# Patient Record
Sex: MALE | Race: WHITE | NOT HISPANIC OR LATINO | Employment: FULL TIME | URBAN - METROPOLITAN AREA
[De-identification: names, ages, dates, MRNs, and addresses within clinical notes are randomized per-mention and may not be internally consistent; named-entity substitution may affect disease eponyms.]

---

## 2017-03-17 ENCOUNTER — TRANSCRIBE ORDERS (OUTPATIENT)
Dept: ADMINISTRATIVE | Facility: HOSPITAL | Age: 27
End: 2017-03-17

## 2017-03-17 ENCOUNTER — HOSPITAL ENCOUNTER (OUTPATIENT)
Dept: RADIOLOGY | Facility: MEDICAL CENTER | Age: 27
Discharge: HOME | End: 2017-03-17
Payer: COMMERCIAL

## 2017-03-17 DIAGNOSIS — M99.05 SOMATIC DYSFUNCTION OF PELVIS REGION: ICD-10-CM

## 2017-03-17 DIAGNOSIS — M99.03 SOMATIC DYSFUNCTION OF LUMBAR REGION: ICD-10-CM

## 2017-03-17 DIAGNOSIS — M79.10 MYALGIA: ICD-10-CM

## 2017-03-17 DIAGNOSIS — M99.04 SOMATIC DYSFUNCTION OF SACRAL REGION: ICD-10-CM

## 2017-03-17 DIAGNOSIS — M79.10 MYALGIA: Primary | ICD-10-CM

## 2017-03-17 PROCEDURE — 72110 X-RAY EXAM L-2 SPINE 4/>VWS: CPT

## 2017-03-17 PROCEDURE — 72170 X-RAY EXAM OF PELVIS: CPT

## 2017-04-12 ENCOUNTER — OFFICE VISIT (OUTPATIENT)
Dept: URGENT CARE | Age: 27
End: 2017-04-12
Payer: COMMERCIAL

## 2017-04-12 PROCEDURE — S9088 SERVICES PROVIDED IN URGENT: HCPCS | Performed by: FAMILY MEDICINE

## 2017-04-12 PROCEDURE — 99203 OFFICE O/P NEW LOW 30 MIN: CPT | Performed by: FAMILY MEDICINE

## 2017-06-27 ENCOUNTER — ALLSCRIPTS OFFICE VISIT (OUTPATIENT)
Dept: OTHER | Facility: OTHER | Age: 27
End: 2017-06-27

## 2017-09-13 ENCOUNTER — TRANSCRIBE ORDERS (OUTPATIENT)
Dept: ADMINISTRATIVE | Facility: HOSPITAL | Age: 27
End: 2017-09-13

## 2017-09-13 DIAGNOSIS — J32.9 CHRONIC SINUSITIS, UNSPECIFIED LOCATION: Primary | ICD-10-CM

## 2017-10-20 ENCOUNTER — HOSPITAL ENCOUNTER (EMERGENCY)
Facility: HOSPITAL | Age: 27
Discharge: HOME | End: 2017-10-21
Attending: EMERGENCY MEDICINE
Payer: COMMERCIAL

## 2017-10-20 DIAGNOSIS — J45.901 ALLERGIC ASTHMA WITH SEVERE ASTHMA ATTACK: Primary | ICD-10-CM

## 2017-10-20 DIAGNOSIS — R09.02 HYPOXIA: ICD-10-CM

## 2017-10-20 DIAGNOSIS — R10.9 ABDOMINAL PAIN: ICD-10-CM

## 2017-10-20 PROCEDURE — 96375 TX/PRO/DX INJ NEW DRUG ADDON: CPT

## 2017-10-20 PROCEDURE — 96372 THER/PROPH/DIAG INJ SC/IM: CPT

## 2017-10-20 PROCEDURE — 93005 ELECTROCARDIOGRAM TRACING: CPT

## 2017-10-20 PROCEDURE — 94640 AIRWAY INHALATION TREATMENT: CPT

## 2017-10-20 RX ORDER — METHYLPREDNISOLONE SODIUM SUCCINATE 125 MG/2ML
INJECTION, POWDER, LYOPHILIZED, FOR SOLUTION INTRAMUSCULAR; INTRAVENOUS
Status: COMPLETED
Start: 2017-10-20 | End: 2017-10-21

## 2017-10-20 RX ADMIN — METHYLPREDNISOLONE SODIUM SUCCINATE 125 MG: 125 INJECTION, POWDER, LYOPHILIZED, FOR SOLUTION INTRAMUSCULAR; INTRAVENOUS at 23:58

## 2017-10-20 RX ADMIN — IPRATROPIUM BROMIDE AND ALBUTEROL SULFATE 3 ML: .5; 3 SOLUTION RESPIRATORY (INHALATION) at 23:56

## 2017-10-20 RX ADMIN — EPINEPHRINE 0.3 MG: 1 INJECTION, SOLUTION, CONCENTRATE INTRAVENOUS at 23:58

## 2017-10-20 RX ADMIN — METHYLPREDNISOLONE SODIUM SUCCINATE 125 MG: 125 INJECTION, POWDER, FOR SOLUTION INTRAMUSCULAR; INTRAVENOUS at 23:58

## 2017-10-21 ENCOUNTER — HOSPITAL ENCOUNTER (OUTPATIENT)
Facility: HOSPITAL | Age: 27
Setting detail: OBSERVATION
Discharge: HOME | End: 2017-10-22
Attending: EMERGENCY MEDICINE | Admitting: INTERNAL MEDICINE
Payer: COMMERCIAL

## 2017-10-21 ENCOUNTER — APPOINTMENT (EMERGENCY)
Dept: RADIOLOGY | Facility: HOSPITAL | Age: 27
End: 2017-10-21
Payer: COMMERCIAL

## 2017-10-21 VITALS
DIASTOLIC BLOOD PRESSURE: 75 MMHG | OXYGEN SATURATION: 94 % | RESPIRATION RATE: 18 BRPM | WEIGHT: 210 LBS | TEMPERATURE: 97 F | SYSTOLIC BLOOD PRESSURE: 140 MMHG | HEART RATE: 98 BPM

## 2017-10-21 DIAGNOSIS — J45.901 ASTHMA EXACERBATION: ICD-10-CM

## 2017-10-21 DIAGNOSIS — J45.51: ICD-10-CM

## 2017-10-21 DIAGNOSIS — Z78.9 FAILURE OF OUTPATIENT TREATMENT: ICD-10-CM

## 2017-10-21 DIAGNOSIS — T78.2XXA ANAPHYLAXIS: Primary | ICD-10-CM

## 2017-10-21 PROBLEM — J33.9 ASPIRIN-SENSITIVE ASTHMA WITH NASAL POLYPS: Status: ACTIVE | Noted: 2017-10-21

## 2017-10-21 PROBLEM — J45.909 ASPIRIN-SENSITIVE ASTHMA WITH NASAL POLYPS: Status: ACTIVE | Noted: 2017-10-21

## 2017-10-21 PROBLEM — Z88.6 ASPIRIN-SENSITIVE ASTHMA WITH NASAL POLYPS: Status: ACTIVE | Noted: 2017-10-21

## 2017-10-21 PROBLEM — Z88.6 ASPIRIN-SENSITIVE ASTHMA WITH NASAL POLYPS: Chronic | Status: ACTIVE | Noted: 2017-10-21

## 2017-10-21 PROBLEM — J45.909 ASPIRIN-SENSITIVE ASTHMA WITH NASAL POLYPS: Chronic | Status: ACTIVE | Noted: 2017-10-21

## 2017-10-21 PROBLEM — J33.9 ASPIRIN-SENSITIVE ASTHMA WITH NASAL POLYPS: Chronic | Status: ACTIVE | Noted: 2017-10-21

## 2017-10-21 PROBLEM — R10.33 PERIUMBILICAL ABDOMINAL PAIN: Status: ACTIVE | Noted: 2017-10-21

## 2017-10-21 LAB
ATRIAL RATE: 100 BPM
ATRIAL RATE: 72 BPM
BASE EXCESS BLDA CALC-SCNC: 3 MMOL/L (ref -2–3)
CA-I BLD-SCNC: 1.21 MMOL/L (ref 1.12–1.32)
GLUCOSE SERPL-MCNC: 131 MG/DL (ref 65–140)
HCO3 BLDA-SCNC: 30.2 MMOL/L (ref 24–30)
HCT VFR BLD CALC: 47 % (ref 36.5–49.3)
HGB BLDA-MCNC: 16 G/DL (ref 12–17)
P AXIS: 68 DEGREES
P AXIS: 71 DEGREES
PCO2 BLD: 32 MMOL/L (ref 21–32)
PCO2 BLD: 54.3 MM HG (ref 42–50)
PH BLD: 7.35 [PH] (ref 7.3–7.4)
PO2 BLD: 39 MM HG (ref 35–45)
POTASSIUM BLD-SCNC: 3.3 MMOL/L (ref 3.5–5.3)
PR INTERVAL: 142 MS
PR INTERVAL: 144 MS
QRS AXIS: 74 DEGREES
QRS AXIS: 75 DEGREES
QRSD INTERVAL: 102 MS
QRSD INTERVAL: 96 MS
QT INTERVAL: 340 MS
QT INTERVAL: 426 MS
QTC INTERVAL: 426 MS
QTC INTERVAL: 438 MS
SAO2 % BLD FROM PO2: 69 % (ref 95–98)
SODIUM BLD-SCNC: 142 MMOL/L (ref 136–145)
SPECIMEN SOURCE: ABNORMAL
T WAVE AXIS: 59 DEGREES
T WAVE AXIS: 68 DEGREES
VENTRICULAR RATE: 100 BPM
VENTRICULAR RATE: 60 BPM

## 2017-10-21 PROCEDURE — 93005 ELECTROCARDIOGRAM TRACING: CPT

## 2017-10-21 PROCEDURE — 96361 HYDRATE IV INFUSION ADD-ON: CPT

## 2017-10-21 PROCEDURE — 85014 HEMATOCRIT: CPT

## 2017-10-21 PROCEDURE — 99285 EMERGENCY DEPT VISIT HI MDM: CPT

## 2017-10-21 PROCEDURE — 96374 THER/PROPH/DIAG INJ IV PUSH: CPT

## 2017-10-21 PROCEDURE — 94644 CONT INHLJ TX 1ST HOUR: CPT

## 2017-10-21 PROCEDURE — 93005 ELECTROCARDIOGRAM TRACING: CPT | Performed by: EMERGENCY MEDICINE

## 2017-10-21 PROCEDURE — 84132 ASSAY OF SERUM POTASSIUM: CPT

## 2017-10-21 PROCEDURE — 96375 TX/PRO/DX INJ NEW DRUG ADDON: CPT

## 2017-10-21 PROCEDURE — 94640 AIRWAY INHALATION TREATMENT: CPT

## 2017-10-21 PROCEDURE — 96365 THER/PROPH/DIAG IV INF INIT: CPT

## 2017-10-21 PROCEDURE — 84295 ASSAY OF SERUM SODIUM: CPT

## 2017-10-21 PROCEDURE — 82947 ASSAY GLUCOSE BLOOD QUANT: CPT

## 2017-10-21 PROCEDURE — 71020 HB CHEST X-RAY 2VW FRONTAL&LATL: CPT

## 2017-10-21 PROCEDURE — 96372 THER/PROPH/DIAG INJ SC/IM: CPT

## 2017-10-21 PROCEDURE — 82330 ASSAY OF CALCIUM: CPT

## 2017-10-21 PROCEDURE — 99283 EMERGENCY DEPT VISIT LOW MDM: CPT

## 2017-10-21 PROCEDURE — 94760 N-INVAS EAR/PLS OXIMETRY 1: CPT

## 2017-10-21 PROCEDURE — 82803 BLOOD GASES ANY COMBINATION: CPT

## 2017-10-21 RX ORDER — CETIRIZINE HYDROCHLORIDE 10 MG/1
10 TABLET ORAL DAILY
COMMUNITY
End: 2017-12-21

## 2017-10-21 RX ORDER — EPINEPHRINE 1 MG/ML(1)
0.3 AMPUL (ML) INJECTION AS NEEDED
Status: DISCONTINUED | OUTPATIENT
Start: 2017-10-21 | End: 2017-10-22 | Stop reason: HOSPADM

## 2017-10-21 RX ORDER — LEVALBUTEROL 1.25 MG/.5ML
1.25 SOLUTION, CONCENTRATE RESPIRATORY (INHALATION)
Status: DISCONTINUED | OUTPATIENT
Start: 2017-10-22 | End: 2017-10-22 | Stop reason: HOSPADM

## 2017-10-21 RX ORDER — ONDANSETRON 2 MG/ML
4 INJECTION INTRAMUSCULAR; INTRAVENOUS ONCE
Status: COMPLETED | OUTPATIENT
Start: 2017-10-21 | End: 2017-10-21

## 2017-10-21 RX ORDER — MAGNESIUM SULFATE HEPTAHYDRATE 40 MG/ML
2 INJECTION, SOLUTION INTRAVENOUS ONCE
Status: COMPLETED | OUTPATIENT
Start: 2017-10-21 | End: 2017-10-21

## 2017-10-21 RX ORDER — PAROXETINE 10 MG/1
10 TABLET, FILM COATED ORAL DAILY
Status: DISCONTINUED | OUTPATIENT
Start: 2017-10-22 | End: 2017-10-22

## 2017-10-21 RX ORDER — ACETAMINOPHEN 325 MG/1
650 TABLET ORAL EVERY 6 HOURS PRN
Status: DISCONTINUED | OUTPATIENT
Start: 2017-10-21 | End: 2017-10-22 | Stop reason: HOSPADM

## 2017-10-21 RX ORDER — MONTELUKAST SODIUM 10 MG/1
10 TABLET ORAL
Status: DISCONTINUED | OUTPATIENT
Start: 2017-10-21 | End: 2017-10-22 | Stop reason: HOSPADM

## 2017-10-21 RX ORDER — SODIUM CHLORIDE FOR INHALATION 0.9 %
3 VIAL, NEBULIZER (ML) INHALATION EVERY 6 HOURS PRN
Status: DISCONTINUED | OUTPATIENT
Start: 2017-10-21 | End: 2017-10-22 | Stop reason: HOSPADM

## 2017-10-21 RX ORDER — LEVALBUTEROL 1.25 MG/.5ML
1.25 SOLUTION, CONCENTRATE RESPIRATORY (INHALATION)
Status: DISCONTINUED | OUTPATIENT
Start: 2017-10-22 | End: 2017-10-21

## 2017-10-21 RX ORDER — EPINEPHRINE 0.3 MG/.3ML
0.3 INJECTION SUBCUTANEOUS ONCE
Qty: 0.3 ML | Refills: 0 | Status: SHIPPED | OUTPATIENT
Start: 2017-10-21 | End: 2017-12-21

## 2017-10-21 RX ORDER — METHYLPREDNISOLONE SODIUM SUCCINATE 125 MG/2ML
125 INJECTION, POWDER, LYOPHILIZED, FOR SOLUTION INTRAMUSCULAR; INTRAVENOUS ONCE
Status: COMPLETED | OUTPATIENT
Start: 2017-10-21 | End: 2017-10-21

## 2017-10-21 RX ORDER — ALBUTEROL SULFATE 90 UG/1
2 AEROSOL, METERED RESPIRATORY (INHALATION) EVERY 6 HOURS PRN
COMMUNITY
End: 2017-12-11

## 2017-10-21 RX ORDER — DIPHENHYDRAMINE HYDROCHLORIDE 50 MG/ML
25 INJECTION INTRAMUSCULAR; INTRAVENOUS ONCE
Status: COMPLETED | OUTPATIENT
Start: 2017-10-21 | End: 2017-10-21

## 2017-10-21 RX ORDER — SODIUM CHLORIDE FOR INHALATION 0.9 %
3 VIAL, NEBULIZER (ML) INHALATION
Status: DISCONTINUED | OUTPATIENT
Start: 2017-10-22 | End: 2017-10-22 | Stop reason: HOSPADM

## 2017-10-21 RX ORDER — SODIUM CHLORIDE FOR INHALATION 0.9 %
3 VIAL, NEBULIZER (ML) INHALATION EVERY 4 HOURS PRN
Status: DISCONTINUED | OUTPATIENT
Start: 2017-10-21 | End: 2017-10-21

## 2017-10-21 RX ORDER — PAROXETINE 10 MG/1
10 TABLET, FILM COATED ORAL DAILY
COMMUNITY
End: 2017-10-22 | Stop reason: HOSPADM

## 2017-10-21 RX ORDER — SODIUM CHLORIDE FOR INHALATION 0.9 %
3 VIAL, NEBULIZER (ML) INHALATION
Status: DISCONTINUED | OUTPATIENT
Start: 2017-10-22 | End: 2017-10-21

## 2017-10-21 RX ORDER — IPRATROPIUM BROMIDE AND ALBUTEROL SULFATE 2.5; .5 MG/3ML; MG/3ML
3 SOLUTION RESPIRATORY (INHALATION)
Status: DISCONTINUED | OUTPATIENT
Start: 2017-10-21 | End: 2017-10-21 | Stop reason: HOSPADM

## 2017-10-21 RX ORDER — ALBUTEROL SULFATE 90 UG/1
2 AEROSOL, METERED RESPIRATORY (INHALATION) EVERY 6 HOURS PRN
Status: DISCONTINUED | OUTPATIENT
Start: 2017-10-21 | End: 2017-10-22 | Stop reason: HOSPADM

## 2017-10-21 RX ORDER — EPINEPHRINE 0.3 MG/.3ML
0.3 INJECTION SUBCUTANEOUS ONCE
Status: DISCONTINUED | OUTPATIENT
Start: 2017-10-21 | End: 2017-10-21

## 2017-10-21 RX ORDER — ONDANSETRON 2 MG/ML
4 INJECTION INTRAMUSCULAR; INTRAVENOUS EVERY 6 HOURS PRN
Status: DISCONTINUED | OUTPATIENT
Start: 2017-10-21 | End: 2017-10-22 | Stop reason: HOSPADM

## 2017-10-21 RX ORDER — ALBUTEROL SULFATE 2.5 MG/3ML
2.5 SOLUTION RESPIRATORY (INHALATION) EVERY 4 HOURS PRN
Status: DISCONTINUED | OUTPATIENT
Start: 2017-10-21 | End: 2017-10-21

## 2017-10-21 RX ORDER — ALBUTEROL SULFATE 2.5 MG/3ML
10 SOLUTION RESPIRATORY (INHALATION) ONCE
Status: COMPLETED | OUTPATIENT
Start: 2017-10-21 | End: 2017-10-21

## 2017-10-21 RX ORDER — METHYLPREDNISOLONE SODIUM SUCCINATE 40 MG/ML
40 INJECTION, POWDER, LYOPHILIZED, FOR SOLUTION INTRAMUSCULAR; INTRAVENOUS EVERY 6 HOURS SCHEDULED
Status: DISCONTINUED | OUTPATIENT
Start: 2017-10-22 | End: 2017-10-22

## 2017-10-21 RX ORDER — LEVALBUTEROL 1.25 MG/.5ML
1.25 SOLUTION, CONCENTRATE RESPIRATORY (INHALATION) EVERY 6 HOURS PRN
Status: DISCONTINUED | OUTPATIENT
Start: 2017-10-21 | End: 2017-10-22 | Stop reason: HOSPADM

## 2017-10-21 RX ORDER — MONTELUKAST SODIUM 10 MG/1
10 TABLET ORAL DAILY
COMMUNITY
End: 2018-07-06 | Stop reason: SDUPTHER

## 2017-10-21 RX ORDER — ONDANSETRON 2 MG/ML
INJECTION INTRAMUSCULAR; INTRAVENOUS
Status: COMPLETED
Start: 2017-10-21 | End: 2017-10-21

## 2017-10-21 RX ADMIN — MAGNESIUM SULFATE HEPTAHYDRATE 2 G: 40 INJECTION, SOLUTION INTRAVENOUS at 20:15

## 2017-10-21 RX ADMIN — IPRATROPIUM BROMIDE 1 MG: 0.5 SOLUTION RESPIRATORY (INHALATION) at 21:02

## 2017-10-21 RX ADMIN — MAGNESIUM SULFATE HEPTAHYDRATE 2 G: 40 INJECTION, SOLUTION INTRAVENOUS at 02:34

## 2017-10-21 RX ADMIN — ONDANSETRON 4 MG: 2 INJECTION INTRAMUSCULAR; INTRAVENOUS at 20:13

## 2017-10-21 RX ADMIN — DIPHENHYDRAMINE HYDROCHLORIDE 25 MG: 50 INJECTION, SOLUTION INTRAMUSCULAR; INTRAVENOUS at 20:07

## 2017-10-21 RX ADMIN — ALBUTEROL SULFATE 5 MG: 2.5 SOLUTION RESPIRATORY (INHALATION) at 00:42

## 2017-10-21 RX ADMIN — EPINEPHRINE 0.3 MG: 1 INJECTION, SOLUTION, CONCENTRATE INTRAVENOUS at 20:03

## 2017-10-21 RX ADMIN — IPRATROPIUM BROMIDE 1 MG: 0.5 SOLUTION RESPIRATORY (INHALATION) at 00:43

## 2017-10-21 RX ADMIN — ALBUTEROL SULFATE 5 MG: 2.5 SOLUTION RESPIRATORY (INHALATION) at 00:51

## 2017-10-21 RX ADMIN — ALBUTEROL SULFATE 10 MG: 2.5 SOLUTION RESPIRATORY (INHALATION) at 21:02

## 2017-10-21 RX ADMIN — MONTELUKAST SODIUM 10 MG: 10 TABLET, COATED ORAL at 22:35

## 2017-10-21 RX ADMIN — IPRATROPIUM BROMIDE 0.5 MG: 0.5 SOLUTION RESPIRATORY (INHALATION) at 20:04

## 2017-10-21 RX ADMIN — SODIUM CHLORIDE 1000 ML: 0.9 INJECTION, SOLUTION INTRAVENOUS at 20:04

## 2017-10-21 RX ADMIN — ALBUTEROL SULFATE 5 MG: 2.5 SOLUTION RESPIRATORY (INHALATION) at 20:04

## 2017-10-21 RX ADMIN — METHYLPREDNISOLONE SODIUM SUCCINATE 125 MG: 125 INJECTION, POWDER, FOR SOLUTION INTRAMUSCULAR; INTRAVENOUS at 20:13

## 2017-10-21 NOTE — DISCHARGE INSTRUCTIONS
Asthma   WHAT YOU NEED TO KNOW:   Asthma is a lung disease that makes breathing difficult  Chronic inflammation and reactions to triggers narrow the airways in the lungs  Asthma can become life-threatening if it is not managed  DISCHARGE INSTRUCTIONS:   Return to the emergency department if:   · You have severe shortness of breath  · Your lips or nails turn blue or gray  · The skin around your neck and ribs pulls in with each breath  · You have shortness of breath, even after you take your short-term medicine as directed  · Your peak flow numbers are in the red zone of your AAP  Contact your healthcare provider if:   · You run out of medicine before your next refill is due  · Your symptoms get worse  · You need to take more medicine than usual to control your symptoms  · You have questions or concerns about your condition or care  Medicines:   · Medicines  decrease inflammation, open airways, and make it easier to breathe  Medicines may be inhaled, taken as a pill, or injected  Short-term medicines relieve your symptoms quickly  Long-term medicines are used to prevent future attacks  You may also need medicine to help control your allergies  Ask your healthcare provider for more information about the medicine you are given and how to take it safely  · Take your medicine as directed  Contact your healthcare provider if you think your medicine is not helping or if you have side effects  Tell him of her if you are allergic to any medicine  Keep a list of the medicines, vitamins, and herbs you take  Include the amounts, and when and why you take them  Bring the list or the pill bottles to follow-up visits  Carry your medicine list with you in case of an emergency  Follow up with your healthcare provider as directed: You will need to return to make sure your medicine is working and your symptoms are controlled   You may be referred to an asthma specialist  Lilliana Kim may be asked to keep a record of your peak flow values and bring it with you to your appointments  Write down your questions so you remember to ask them during your visits  Manage your symptoms and prevent future attacks:   · Follow your Asthma Action Plan (AAP)  This is a written plan that you and your healthcare provider create  It explains which medicine you need and when to change doses if necessary  It also explains how you can monitor symptoms and use a peak flow meter  The meter measures how well your lungs are working  · Manage other health conditions , such as allergies, acid reflux, and sleep apnea  · Identify and avoid triggers  These may include pets, dust mites, mold, and cockroaches  · Do not smoke or be around others who smoke  Nicotine and other chemicals in cigarettes and cigars can cause lung damage  Ask your healthcare provider for information if you currently smoke and need help to quit  E-cigarettes or smokeless tobacco still contain nicotine  Talk to your healthcare provider before you use these products  · Ask about the flu vaccine  The flu can make your asthma worse  You may need a yearly flu shot  © 2017 [facility] Information is for End User's use only and may not be sold, redistributed or otherwise used for commercial purposes  All illustrations and images included in CareNotes® are the copyrighted property of A D A M [employer] Inc  or [employer]  The above information is an  only  It is not intended as medical advice for individual conditions or treatments  Talk to your doctor, nurse or pharmacist before following any medical regimen to see if it is safe and effective for you  Anaphylaxis   WHAT YOU NEED TO KNOW:   Anaphylaxis is a life-threatening allergic reaction that must be treated immediately  Your risk for anaphylaxis increases if you have asthma that is severe or not controlled   Medical conditions such as heart disease can also increase your risk  It is important to be prepared if you are at risk for anaphylaxis  Your symptoms can be worse each time you are exposed to the trigger  DISCHARGE INSTRUCTIONS:   Steps to take for signs or symptoms of anaphylaxis:   · Immediately  give 1 shot of epinephrine only into the outer thigh muscle  · Leave the shot in place  as directed  Your healthcare provider may recommend you leave it in place for up to 10 seconds before you remove it  This helps make sure all of the epinephrine is delivered  · Call 911 and go to the emergency department,  even if the shot improved symptoms  Do not drive yourself  Bring the used epinephrine shot with you  Call 911 for any of the following:   · You have a skin rash, hives, swelling, or itching  · You have trouble breathing, shortness of breath, wheezing, or coughing  · Your throat tightens or your lips or tongue swell  · You have difficulty swallowing or speaking  · You are dizzy, lightheaded, confused, or feel like you are going to faint  · You have nausea, diarrhea, or abdominal cramps, or you are vomiting  Return to the emergency department if:   · Signs or symptoms of anaphylaxis return  Contact your healthcare provider if:   · You have questions or concerns about your condition or care  Medicines:   · Epinephrine  is used to treat severe allergic reactions such as anaphylaxis  · Medicines  such as antihistamines, steroids, and bronchodilators decrease inflammation, open airways, and make breathing easier  · Take your medicine as directed  Contact your healthcare provider if you think your medicine is not helping or if you have side effects  Tell him or her if you are allergic to any medicine  Keep a list of the medicines, vitamins, and herbs you take  Include the amounts, and when and why you take them  Bring the list or the pill bottles to follow-up visits   Carry your medicine list with you in case of an emergency  Follow up with your healthcare provider as directed: Allergy testing may reveal allergies that can trigger anaphylaxis  Write down your questions so you remember to ask them during your visits  Safety precautions:   · Keep 2 shots of epinephrine with you at all times  You may need a second shot, because epinephrine only works for about 20 minutes and symptoms may return  Your healthcare provider can show you and family members how to give the shot  Check the expiration date every month and replace it before it expires  · Create an action plan  Your healthcare provider can help you create a written plan that explains the allergy and an emergency plan to treat a reaction  The plan explains when to give a second epinephrine shot if symptoms return or do not improve after the first  Give copies of the action plan and emergency instructions to family members, work and school staff, and  providers  Show them how to give a shot of epinephrine  · Be careful when you exercise  If you have had exercise-induced anaphylaxis, do not exercise right after you eat  Stop exercising right away if you start to develop any signs or symptoms of anaphylaxis  You may first feel tired, warm, or have itchy skin  Hives, swelling, and severe breathing problems may develop if you continue to exercise  · Carry medical alert identification  Wear medical alert jewelry or carry a card that explains the allergy  Ask your healthcare provider where to get these items  · Identify and avoid known triggers  Read food labels for ingredients  Look for triggers in your environment  · Ask about treatments to prevent anaphylaxis  You may need allergy shots or other medicines to treat allergies  © 2017 [address] [facility] Information is for End User's use only and may not be sold, redistributed or otherwise used for commercial purposes   All illustrations and images included in CareNotes® are the copyrighted property of A D A M , Inc  or [address]  The above information is an  only  It is not intended as medical advice for individual conditions or treatments  Talk to your doctor, nurse or pharmacist before following any medical regimen to see if it is safe and effective for you

## 2017-10-21 NOTE — ED ATTENDING ATTESTATION
Sebastian Osborne MD, saw and evaluated the patient  I have discussed the patient with the resident/non-physician practitioner and agree with the resident's/non-physician practitioner's findings, Plan of Care, and MDM as documented in the resident's/non-physician practitioner's note, except where noted  All available labs and Radiology studies were reviewed  At this point I agree with the current assessment done in the Emergency Department  I have conducted an independent evaluation of this patient including a focused history and a physical exam       Patient seen on arrival to the emergency department with the resident  Patient with history of severe asthma, history of previous intubation, presenting to the emergency department for evaluation of vomiting and shortness of breath that he believes could be or allergic reaction  The patient states that he was in his usual state of health until this evening when he felt like he need to have diarrhea, had gone to the bathroom, had multiple episodes of vomiting, and subsequently became short of breath  The patient reported that he felt very tight like he was having an asthma exacerbation additionally  The patient took Benadryl prior to arrival in the emergency department  Patient reports some mild crampy abdominal discomfort  Patient denied actually having diarrhea  Patient denies any dysuria or urinary frequency  Patient has multiple known allergies  Ten systems reviewed and are negative except as noted  On arrival the patient had an oxygen saturation of 75-78% on room air with a good waveform  The patient appeared slightly pale / gray  Head is normocephalic and atraumatic  Mucous membranes are moist   No oropharyngeal swelling is noted  No stridor  Trachea is midline  No subcutaneous emphysema is appreciated  Heart is regular rate and rhythm with no murmurs rubs or gallops  Lungs initially with diminished breath sounds in all lung fields  Mild expiratory wheezing  Abdomen is soft and nontender to palpation  Extremities are unremarkable in appearance  Skin is slightly diaphoretic  No rashes  GCS is 15, motor is grossly 5/5 in the bilateral upper and lower extremities  Lower extremities without any significant asymmetric edema  No calf tenderness bilaterally  Negative Homans sign bilaterally  Differential diagnosis includes anaphylaxis versus asthma exacerbation triggered by multiple episodes of vomiting  Given the possibility that this represented anaphylaxis, patient was administered IM epinephrine  Patient was started on nebulized albuterol and placed on oxygen  Patient received Solu-Medrol  Patient had repeat lung exam performed after being placed on albuterol, and was noted to have more air movement throughout his lung fields with now inspiratory and expiratory wheezing noted  Oxygen saturation corrected to 98% on the oxygen and breathing treatments  Labs Reviewed   POCT BLOOD GAS (CG8+) - Abnormal        Result Value Ref Range Status    pCO2, Chong i-STAT 54 3 (*) 42 0 - 50 0 mm HG Final    HCO3, Chong i-STAT 30 2 (*) 24 0 - 30 0 mmol/L Final    O2 Sat, i-STAT 69 (*) 95 - 98 % Final    Potassium, i-STAT 3 3 (*) 3 5 - 5 3 mmol/L Final    ph, Chong ISTAT 7 353  7 300 - 7 400 Final    pO2, Chong i-STAT 39 0  35 0 - 45 0 mm HG Final    BE, i-STAT 3  -2 - 3 mmol/L Final    CO2, i-STAT 32  21 - 32 mmol/L Final    SODIUM, I-STAT 142  136 - 145 mmol/l Final    Calcium, Ionized i-STAT 1 21  1 12 - 1 32 mmol/L Final    Hct, i-STAT 47  36 5 - 49 3 % Final    Hgb, i-STAT 16 0  12 0 - 17 0 g/dl Final    Glucose, i-STAT 131  65 - 140 mg/dl Final    Specimen Type VENOUS   Final       XR chest 2 views   ED Interpretation   No acute disease  Final Result      No acute cardiopulmonary disease, stable                                        Findings are consistent with emergency room physician's preliminary reading           Workstation performed: [id number]              Total critical care time involved in initial assessment and multiple reassessments in the emergency department for possible anaphylaxis with noted hypoxia equals 38 minutes

## 2017-10-21 NOTE — ED PROVIDER NOTES
History  Chief Complaint   Patient presents with    Allergic Reaction     trouble breathing, vomiting; thinks he's having an allergic reaction to something     HPI   60-year-old male presents to the emergency department with complaints of difficulty breathing and abdominal pain  Patient states that approximately 20 minutes prior to evaluation, he developed acute onset abdominal pain and felt like he had to have a bowel movement  He went to the bathroom and did have a normal bowel movement, but also had nausea and vomiting, after which he became acutely short of breath  He used his inhaler at home without improvement of symptoms  He denies having had a similar presentation in the past, but has had similar symptoms in the past due to allergies and asthma  Given his extensive history of allergies and concern for allergic reaction, he also took benadryl at home without improvement of symptoms  He denies any known new exposure, but states that he has a lot of allergies to environmental allergens  He has never required use of an EpiPen  Patient does have asthma, for which he was intubated 2 years ago  On ROS, he denies any complaints other than stated above  Prior to Admission Medications   Prescriptions Last Dose Informant Patient Reported? Taking?    albuterol (PROVENTIL HFA,VENTOLIN HFA) 90 mcg/act inhaler 10/19/2017 at Unknown time  Yes Yes   Sig: Inhale 2 puffs every 6 (six) hours as needed for wheezing   cetirizine (ZyrTEC) 10 mg tablet 10/19/2017 at Unknown time  Yes Yes   Sig: Take 10 mg by mouth daily   fluticasone-salmeterol (ADVAIR) 250-50 mcg/dose inhaler 10/19/2017 at Unknown time  Yes Yes   Sig: Inhale 1 puff daily   montelukast (SINGULAIR) 10 mg tablet 10/19/2017 at Unknown time  Yes Yes   Sig: Take 10 mg by mouth daily at bedtime      Facility-Administered Medications: None       Past Medical History:   Diagnosis Date    Aspirin-sensitive asthma with nasal polyps 10/21/2017    Asthma     Chronic sinusitis        Past Surgical History:   Procedure Laterality Date    SKIN GRAFT         Family History   Problem Relation Age of Onset    No Known Problems Mother     No Known Problems Father      I have reviewed and agree with the history as documented  Social History   Substance Use Topics    Smoking status: Never Smoker    Smokeless tobacco: Current User    Alcohol use No        Review of Systems   Constitutional: Negative for chills and fever  Eyes: Negative  Respiratory: Positive for shortness of breath and wheezing  Cardiovascular: Negative for chest pain  Gastrointestinal: Positive for abdominal pain, nausea and vomiting  Negative for constipation and diarrhea  Musculoskeletal: Negative for back pain  Skin: Negative for rash and wound  Allergic/Immunologic: Positive for environmental allergies  Negative for immunocompromised state  Neurological: Negative for headaches  Psychiatric/Behavioral: The patient is not nervous/anxious  All other systems reviewed and are negative  Physical Exam  ED Triage Vitals   Temperature Pulse Respirations Blood Pressure SpO2   10/20/17 2348 10/20/17 2348 10/20/17 2348 10/20/17 2348 10/20/17 2348   (!) 97 °F (36 1 °C) 84 (!) 24 134/69 (!) 79 %      Temp Source Heart Rate Source Patient Position - Orthostatic VS BP Location FiO2 (%)   10/20/17 2348 10/21/17 0215 10/21/17 0210 10/21/17 0215 --   Tympanic Monitor Sitting Right arm       Pain Score       10/20/17 2348       No Pain           Physical Exam   Constitutional: He is oriented to person, place, and time  HENT:   Head: Normocephalic and atraumatic  Eyes: EOM are normal    Neck: Normal range of motion  Neck supple  Cardiovascular: Normal rate and regular rhythm  Pulmonary/Chest: He has decreased breath sounds (diffuse)  He has wheezes  Abdominal: Soft  He exhibits no distension  There is tenderness  There is no guarding  Musculoskeletal: Normal range of motion  Neurological: He is alert and oriented to person, place, and time  Skin: Skin is warm  He is diaphoretic (minimally)  Psychiatric: He has a normal mood and affect  His behavior is normal    Nursing note and vitals reviewed  ED Medications  Medications   ipratropium (ATROVENT) 0 02 % inhalation solution 1 mg (1 mg Nebulization Given 10/21/17 0043)   albuterol inhalation solution 5 mg (5 mg Nebulization Given 10/21/17 0051)   albuterol inhalation solution 5 mg (5 mg Nebulization Given 10/21/17 0042)   EPINEPHrine (ADRENALIN) 1 mg/mL injection 0 3 mg (0 3 mg Intramuscular Given 10/20/17 2358)   methylPREDNISolone sodium succinate (Solu-MEDROL) injection 125 mg (125 mg Intravenous Given 10/20/17 2358)   magnesium sulfate 2 g/50 mL IVPB (premix) 2 g (0 g Intravenous Stopped 10/21/17 0302)       Diagnostic Studies  Labs Reviewed   POCT BLOOD GAS (CG8+) - Abnormal        Result Value Ref Range Status    pCO2, Chong i-STAT 54 3 (*) 42 0 - 50 0 mm HG Final    HCO3, Chong i-STAT 30 2 (*) 24 0 - 30 0 mmol/L Final    O2 Sat, i-STAT 69 (*) 95 - 98 % Final    Potassium, i-STAT 3 3 (*) 3 5 - 5 3 mmol/L Final    ph, Chong ISTAT 7 353  7 300 - 7 400 Final    pO2, Chong i-STAT 39 0  35 0 - 45 0 mm HG Final    BE, i-STAT 3  -2 - 3 mmol/L Final    CO2, i-STAT 32  21 - 32 mmol/L Final    SODIUM, I-STAT 142  136 - 145 mmol/l Final    Calcium, Ionized i-STAT 1 21  1 12 - 1 32 mmol/L Final    Hct, i-STAT 47  36 5 - 49 3 % Final    Hgb, i-STAT 16 0  12 0 - 17 0 g/dl Final    Glucose, i-STAT 131  65 - 140 mg/dl Final    Specimen Type VENOUS   Final       XR chest 2 views   ED Interpretation   No acute disease  Final Result      No acute cardiopulmonary disease, stable                                        Findings are consistent with emergency room physician's preliminary reading           Workstation performed: [id number]             Procedures  Procedures      Phone Consults  ED Phone Contact    ED Course  ED Course as of Oct 25 8352   Sat Oct 21, 2017   0038 Patient feeling better, "I feel like I can breathe now "  Steve Pert neb just beginning  0148 Reassessment: Steve Pert neb done  Lungs CTAB  Patient feeling well, still congested, but no complaints otherwise  MDM  Number of Diagnoses or Management Options  Abdominal pain:   Allergic asthma with severe asthma attack:   Hypoxia:   Diagnosis management comments: 58-year-old male with anaphylaxis and/or severe acute asthma exacerbation  Given Epi, pepcid, soludrol at bedside  Will give nebs and reassess  Dispo pending  Shared decision making with patient following duoneb, Steve Pert neb, and IV meds - patient feeling well without any respiratory complaints  Offered admission, but patient would like to return home  He understands and agrees with all return precautions  CritCare Time    Disposition  Final diagnoses:   Hypoxia   Abdominal pain   Allergic asthma with severe asthma attack     ED Disposition     ED Disposition Condition Comment    Discharge  Staley Holes discharge to home/self care      Condition at discharge: Good        Follow-up Information     Follow up With Specialties Details Why [facility] Emergency Department Emergency Medicine  As needed, If symptoms worsen [address] [place] [address], [address] [place] [address], [id number]        Discharge Medication List as of 10/21/2017  3:17 AM      START taking these medications    Details   EPINEPHrine (EPIPEN) 0 3 mg/0 3 mL SOAJ Inject 0 3 mL into the shoulder, thigh, or buttocks once for 1 dose, Starting Sat 10/21/2017, Print         CONTINUE these medications which have NOT CHANGED    Details   albuterol (PROVENTIL HFA,VENTOLIN HFA) 90 mcg/act inhaler Inhale 2 puffs every 6 (six) hours as needed for wheezing, Historical Med      cetirizine (ZyrTEC) 10 mg tablet Take 10 mg by mouth daily, Historical Med      fluticasone-salmeterol (ADVAIR) 250-50 mcg/dose inhaler Inhale 1 puff daily, Historical Med      montelukast (SINGULAIR) 10 mg tablet Take 10 mg by mouth daily at bedtime, Historical Med      PARoxetine (PAXIL) 10 mg tablet Take 10 mg by mouth daily, Historical Med           No discharge procedures on file  ED Provider  Attending physically available and evaluated Uziel Reece I managed the patient along with the ED Attending      Electronically Signed by       Noble Kathleen MD  Resident  10/25/17 5175

## 2017-10-22 VITALS
RESPIRATION RATE: 18 BRPM | TEMPERATURE: 98.1 F | BODY MASS INDEX: 28.44 KG/M2 | SYSTOLIC BLOOD PRESSURE: 122 MMHG | HEIGHT: 72 IN | OXYGEN SATURATION: 96 % | DIASTOLIC BLOOD PRESSURE: 61 MMHG | HEART RATE: 94 BPM | WEIGHT: 210 LBS

## 2017-10-22 PROBLEM — J06.9 URI (UPPER RESPIRATORY INFECTION): Status: ACTIVE | Noted: 2017-10-22

## 2017-10-22 PROBLEM — J96.01 ACUTE RESPIRATORY FAILURE WITH HYPOXIA (HCC): Status: ACTIVE | Noted: 2017-10-22

## 2017-10-22 PROBLEM — Z78.9 FAILURE OF OUTPATIENT TREATMENT: Status: ACTIVE | Noted: 2017-10-22

## 2017-10-22 LAB
ANION GAP SERPL CALCULATED.3IONS-SCNC: 8 MMOL/L (ref 4–13)
BUN SERPL-MCNC: 14 MG/DL (ref 5–25)
CALCIUM SERPL-MCNC: 8.6 MG/DL (ref 8.3–10.1)
CHLORIDE SERPL-SCNC: 105 MMOL/L (ref 100–108)
CO2 SERPL-SCNC: 26 MMOL/L (ref 21–32)
CREAT SERPL-MCNC: 1.14 MG/DL (ref 0.6–1.3)
ERYTHROCYTE [DISTWIDTH] IN BLOOD BY AUTOMATED COUNT: 13.4 % (ref 11.6–15.1)
GFR SERPL CREATININE-BSD FRML MDRD: 87 ML/MIN/1.73SQ M
GLUCOSE SERPL-MCNC: 175 MG/DL (ref 65–140)
HCT VFR BLD AUTO: 39.5 % (ref 36.5–49.3)
HGB BLD-MCNC: 13.8 G/DL (ref 12–17)
MCH RBC QN AUTO: 31.9 PG (ref 26.8–34.3)
MCHC RBC AUTO-ENTMCNC: 34.9 G/DL (ref 31.4–37.4)
MCV RBC AUTO: 91 FL (ref 82–98)
PLATELET # BLD AUTO: 225 THOUSANDS/UL (ref 149–390)
PMV BLD AUTO: 11.5 FL (ref 8.9–12.7)
POTASSIUM SERPL-SCNC: 4 MMOL/L (ref 3.5–5.3)
RBC # BLD AUTO: 4.33 MILLION/UL (ref 3.88–5.62)
SODIUM SERPL-SCNC: 139 MMOL/L (ref 136–145)
WBC # BLD AUTO: 18.47 THOUSAND/UL (ref 4.31–10.16)

## 2017-10-22 PROCEDURE — 80048 BASIC METABOLIC PNL TOTAL CA: CPT | Performed by: INTERNAL MEDICINE

## 2017-10-22 PROCEDURE — 94640 AIRWAY INHALATION TREATMENT: CPT

## 2017-10-22 PROCEDURE — 94760 N-INVAS EAR/PLS OXIMETRY 1: CPT

## 2017-10-22 PROCEDURE — 94150 VITAL CAPACITY TEST: CPT

## 2017-10-22 PROCEDURE — 85027 COMPLETE CBC AUTOMATED: CPT | Performed by: INTERNAL MEDICINE

## 2017-10-22 RX ORDER — METHYLPREDNISOLONE SODIUM SUCCINATE 40 MG/ML
40 INJECTION, POWDER, LYOPHILIZED, FOR SOLUTION INTRAMUSCULAR; INTRAVENOUS EVERY 12 HOURS SCHEDULED
Status: DISCONTINUED | OUTPATIENT
Start: 2017-10-22 | End: 2017-10-22 | Stop reason: HOSPADM

## 2017-10-22 RX ORDER — PREDNISONE 10 MG/1
TABLET ORAL
Qty: 20 TABLET | Refills: 0 | Status: SHIPPED | OUTPATIENT
Start: 2017-10-22 | End: 2017-12-21

## 2017-10-22 RX ORDER — PAROXETINE 10 MG/1
5 TABLET, FILM COATED ORAL DAILY
Qty: 90 TABLET | Refills: 0 | Status: SHIPPED | OUTPATIENT
Start: 2017-10-22 | End: 2017-12-21

## 2017-10-22 RX ORDER — PAROXETINE 10 MG/1
5 TABLET, FILM COATED ORAL DAILY
Status: DISCONTINUED | OUTPATIENT
Start: 2017-10-22 | End: 2017-10-22 | Stop reason: HOSPADM

## 2017-10-22 RX ORDER — METHYLPREDNISOLONE SODIUM SUCCINATE 40 MG/ML
40 INJECTION, POWDER, LYOPHILIZED, FOR SOLUTION INTRAMUSCULAR; INTRAVENOUS EVERY 12 HOURS SCHEDULED
Status: DISCONTINUED | OUTPATIENT
Start: 2017-10-22 | End: 2017-10-22

## 2017-10-22 RX ADMIN — METHYLPREDNISOLONE SODIUM SUCCINATE 40 MG: 40 INJECTION, POWDER, FOR SOLUTION INTRAMUSCULAR; INTRAVENOUS at 00:49

## 2017-10-22 RX ADMIN — ISODIUM CHLORIDE 3 ML: 0.03 SOLUTION RESPIRATORY (INHALATION) at 13:03

## 2017-10-22 RX ADMIN — ENOXAPARIN SODIUM 40 MG: 40 INJECTION SUBCUTANEOUS at 08:50

## 2017-10-22 RX ADMIN — LEVALBUTEROL HYDROCHLORIDE 1.25 MG: 1.25 SOLUTION, CONCENTRATE RESPIRATORY (INHALATION) at 07:59

## 2017-10-22 RX ADMIN — METHYLPREDNISOLONE SODIUM SUCCINATE 40 MG: 40 INJECTION, POWDER, FOR SOLUTION INTRAMUSCULAR; INTRAVENOUS at 05:00

## 2017-10-22 RX ADMIN — ISODIUM CHLORIDE 3 ML: 0.03 SOLUTION RESPIRATORY (INHALATION) at 07:59

## 2017-10-22 RX ADMIN — METHYLPREDNISOLONE SODIUM SUCCINATE 40 MG: 40 INJECTION, POWDER, FOR SOLUTION INTRAMUSCULAR; INTRAVENOUS at 16:03

## 2017-10-22 RX ADMIN — PAROXETINE HYDROCHLORIDE 5 MG: 10 TABLET, FILM COATED ORAL at 08:49

## 2017-10-22 RX ADMIN — LEVALBUTEROL HYDROCHLORIDE 1.25 MG: 1.25 SOLUTION, CONCENTRATE RESPIRATORY (INHALATION) at 13:03

## 2017-10-22 RX ADMIN — FLUTICASONE PROPIONATE AND SALMETEROL 1 PUFF: 50; 250 POWDER RESPIRATORY (INHALATION) at 08:46

## 2017-10-22 NOTE — DISCHARGE INSTRUCTIONS
Decrease the Paxil to 5 mg daily for 3 weeks  You can cut your current pills in half to use them up and then refill this new prescription  Be sure to get the CAT scan done that ENT ordered and see them in follow-up  See pulmonology in the office in follow-up

## 2017-10-22 NOTE — ED ATTENDING ATTESTATION
Maira Richard MD, saw and evaluated the patient  I have discussed the patient with the resident/non-physician practitioner and agree with the resident's/non-physician practitioner's findings, Plan of Care, and MDM as documented in the resident's/non-physician practitioner's note, except where noted  All available labs and Radiology studies were reviewed  At this point I agree with the current assessment done in the Emergency Department  I have conducted an independent evaluation of this patient including a focused history and a physical exam     Patient seen by myself last night presents back to the emergency department for repeat visit  In summary the patient had presented last night with periumbilical abdominal discomfort, nausea with vomiting  Additionally the patient had shortness of breath  This was consistent with past allergic reactions, the patient was treated for anaphylaxis  I had anticipated that the patient would probably require admission last night, however the patient tells me today that he was feeling significantly better last night, and so set was subsequently discharged after I signed him out  The patient states that he was feeling relatively well throughout the day, had taken a nap this afternoon because of [facility] he had been in the emergency department  He woke from the nap with some periumbilical abdominal discomfort with nausea, similar to the symptoms that he was having last night  Additionally the patient noted that he was having increased shortness of breath  Patient denies any fever, cough, chest pain, diarrhea, dysuria, hematuria, urinary frequency  Ten systems were reviewed and are negative except as noted in the history of present illness  On examination the patient appears to be mildly tachypneic  Head is normocephalic atraumatic  Mucous membranes are dry  Neck is supple without any meningismus  There is no stridor    Lungs are tight with diminished breath sounds with expiratory wheezing noted throughout all lung fields  Heart is tachycardic with no murmurs rubs or gallops  Abdomen is soft and nontender to palpation  Extremities are unremarkable in appearance  Skin is warm and dry  The patient is noted to have a fine papular rash on his extremities, trunk, back  GCS is 15, cranial nerves 2-12 are intact  Suspect rebound anaphylaxis  On further review of history, the patient had shellfish last night so that is a possible the allergic exposure  Patient will be medicated for anaphylaxis and admitted  Total critical care time involved in assessing this patient, reassessing this patient, ordering medications equals 32 minutes

## 2017-10-22 NOTE — NURSING NOTE
Pt walked to lobby with RN and girlfriend  IV removed, discharge paperwork reviewed and scripts given to patient   All questions answered, smoking cessation advice given and numbers to call

## 2017-10-22 NOTE — H&P
INTERNAL MEDICINE HISTORY AND PHYSICAL  ED 09 SOD Team A    NAME: Zuhair Rondon  AGE: 28 y o  SEX: male  : 1990   MRN: [id number]  ENCOUNTER: [id number]    DATE: 10/21/2017  TIME: 10:05 PM    Primary Care Physician: No primary care provider on file  Admitting Provider: Marce Junior MD    Chief complaint:  Shortness of breath    History of Present Illness     Zuhair Rondon is a 28 y o  male with a past medical history of nasal polyps, aspirin intolerance, uncontrolled asthma with respiratory failure requiring intubation who presents with acute exacerbation of shortness of breath of onset 2 days ago  He was seen last night in the ER for symptoms including periumbilical pain, nausea vomiting, diarrhea with shortness of breath  This felt similar to his past allergic reactions and he was treated for anaphylaxis last night  Patient had improved dramatically yesterday and so was sent home and was feeling well earlier today until symptoms recurred  He notes that this time he had a diffuse rash that looked like hives however was nonpruritic  He does not recognize any trigger to his symptoms  His asthma medication include Singulair, Advair, and albuterol inhaler which he has had to use frequently over the past week  He is compliant with his medications  He denies any fever, chills, myalgias  He has been around several ill contacts at home  He denies influenza vaccine this year  He has a history of prior intubation for severe asthma with respiratory failure  He was last hospitalized for asthma exacerbation around January of this year at [facility]  He has a history of many environmental and animal allergies though has never required use of EPIpen  He has seen ENT and was told that he has chronic sinusitis with nasal polyps  He does have aspirin intolerance  The patient is a lifetime nonsmoker, however he does smoke marijuana infrequently    In the emergency department tonight he was treated with Solu-Medrol 125 milligrams, albuterol neb, Benadryl, Mag sulfate and epinephrine and is feeling improved  Review of Systems   Review of Systems   Review of Systems  Constitutional: No fever, no fatigue  Eyes: no pain, denies any visual complaints  ENT:  Nasal polyps, sinusitis   Respiratory:  Dyspnea, asthma exacerbation  Cardiovascular:  Chest tightness without pain  Abdomen:  Positive for vomiting and diarrhea, abdominal pain  MSK: no trauma, no deformity   : no dysuria or hematuria   Neuro: no focal weakness  Skin:  Diffuse urticaria  A 10 system review was performed and is otherwise negative except as mentioned above  Past Medical History     Past Medical History:   Diagnosis Date    Aspirin-sensitive asthma with nasal polyps 10/21/2017    Asthma     Chronic sinusitis        Past Surgical History     Past Surgical History:   Procedure Laterality Date    SKIN GRAFT         Social History     History   Alcohol Use No     History   Drug Use No     History   Smoking Status    Never Smoker   Smokeless Tobacco    Current User       Family History     Family History   Problem Relation Age of Onset    No Known Problems Mother     No Known Problems Father        Medications Prior to Admission     Prior to Admission medications    Medication Sig Start Date End Date Taking?  Authorizing Provider   albuterol (PROVENTIL HFA,VENTOLIN HFA) 90 mcg/act inhaler Inhale 2 puffs every 6 (six) hours as needed for wheezing    Historical Provider, MD   cetirizine (ZyrTEC) 10 mg tablet Take 10 mg by mouth daily    Historical Provider, MD   EPINEPHrine (EPIPEN) 0 3 mg/0 3 mL SOAJ Inject 0 3 mL into the shoulder, thigh, or buttocks once for 1 dose 10/21/17 10/21/17  Amira Bragg MD   fluticasone-salmeterol (ADVAIR) 250-50 mcg/dose inhaler Inhale 1 puff daily    Historical Provider, MD   montelukast (SINGULAIR) 10 mg tablet Take 10 mg by mouth daily at bedtime    Historical Provider, MD   PARoxetine (PAXIL) 10 mg tablet Take 10 mg by mouth daily    Historical Provider, MD       Allergies     Allergies   Allergen Reactions    Cat Hair Extract     Fexofenadine-Pseudoephed Er     Shellfish Allergy        Objective     Vitals:    10/21/17 1956 10/21/17 2015 10/21/17 2102 10/21/17 2115   BP: (!) 174/93 135/73  130/61   Pulse: (!) 107 (!) 114  (!) 110   Resp: 20 18  18   Temp: 97 6 °F (36 4 °C)      TempSrc: Tympanic      SpO2: 94% 96% 100% 100%     There is no height or weight on file to calculate BMI  Intake/Output Summary (Last 24 hours) at 10/21/17 2205  Last data filed at 10/21/17 2004   Gross per 24 hour   Intake             1000 ml   Output                0 ml   Net             1000 ml     Invasive Devices     Peripheral Intravenous Line            Peripheral IV 10/21/17 Right Antecubital less than 1 day                Physical Exam  GENERAL: Appears well-developed and well-nourished  mild increased work of breathing, with nebulizer mask on   HEENT: Normocephalic and atraumatic  No scleral icterus  PERRLA  EOMI B/L  MM moist   Nebulizer mask noted   NECK: Neck supple with no lymphadenopathy  Trachea midline  No JVD  CARDIOVASCULAR: No S3 or S4,  tachycardic, regular rhythm   No murmurs, no edema   RESPIRATORY: Bilateral decreased air entry left greater than right, mild end-expiratory wheeze on the right, speaks in full sentences, not in acute respiratory distress, no accessory muscle use, chest rise symmetrical   BREAST: Deferred  ABDOMINAL: Bowel sounds present, soft nontender, soft, non-distended  No peritoneal signs   EXTREMITIES:  no cyanosis, clubbing, edema  ROM intact  /GYN: Deferred  RECTAL: Deferred  BACK: No tenderness to palpation  No gross deformities  NEUROLOGIC: Patient is alert and oriented to person, place, and time  No sensory or motor deficits  Speech fluent  SKIN: Skin is warm and dry    Over the proximal upper extremities there is several small several millimeter mild punctate lesions that are consistent with urticaria   PSYCHIATRIC: Normal mood and affect     Lab Results: I have personally reviewed pertinent reports  CBC:     Results from last 7 days  Lab Units 10/21/17  0008   I STAT HEMOGLOBIN g/dl 16 0       Imaging: I have personally reviewed pertinent films in PACS  Xr Chest 2 Views    Result Date: 10/21/2017  Narrative: CHEST INDICATION: 69-year-old male, shortness of breath, allergic reaction COMPARISON: 3/28/2011 chest x-ray VIEWS:  Frontal and lateral projections; 2 images FINDINGS: The lungs are clear  No pleural effusions  The cardiomediastinal silhouette is unremarkable  Bony thorax unremarkable  Findings are stable     Impression: No acute cardiopulmonary disease, stable                               Findings are consistent with emergency room physician's preliminary reading  Workstation performed: [id number]       EKG, Pathology, and Other Studies: I have personally reviewed pertinent reports        Medications given in Emergency Department     Medication Administration - last 24 hours from 10/20/2017 2205 to 10/21/2017 2205       Date/Time Order Dose Route Action Action by     10/21/2017 2004 sodium chloride 0 9 % bolus 1,000 mL 1,000 mL Intravenous New Bag Claire Hopper RN     10/21/2017 2003 EPINEPHrine (ADRENALIN) 1 mg/mL injection 0 3 mg 0 3 mg Intramuscular Given Claire Hopper RN     10/21/2017 2007 diphenhydrAMINE (BENADRYL) injection 25 mg 25 mg Intravenous Given Claire Hopper RN     10/21/2017 2013 methylPREDNISolone sodium succinate (Solu-MEDROL) injection 125 mg 125 mg Intravenous Given Claire Hopper RN     10/21/2017 2004 ipratropium (ATROVENT) 0 02 % inhalation solution 0 5 mg 0 5 mg Nebulization Given Claire Hopper RN     10/21/2017 2004 albuterol inhalation solution 5 mg 5 mg Nebulization Given Claire Hopper RN     10/21/2017 2030 magnesium sulfate 2 g/50 mL IVPB (premix) 2 g 0 g Intravenous Stopped Claire Hopper RN 10/21/2017 2015 magnesium sulfate 2 g/50 mL IVPB (premix) 2 g 2 g Intravenous New Bag Kiley Juárez RN     10/21/2017 2013 ondansetron (ZOFRAN) injection 4 mg 4 mg Intravenous Given Kiley Juárez RN     10/21/2017 2102 albuterol inhalation solution 10 mg 10 mg Nebulization Given Chris Meadows, RT     10/21/2017 2102 ipratropium (ATROVENT) 0 02 % inhalation solution 1 mg 1 mg Nebulization Given Chris Meadows, RT          Assessment and Plan     Principal Problem: Allergic atopic asthma, severe persistent, with acute exacerbation  Active Problems:    Aspirin-sensitive asthma with nasal polyps    Periumbilical abdominal pain      1) severe persistent allergic atopic asthma with acute exacerbation  Unknown trigger, patient has many environmental allergies, no fever no chills and chest x-ray is negative for acute findings there for do not suspect infectious contribution  Patient is high risk given prior intubation  Will treat with duo nebs, Solu-Medrol 40 milligrams IV q 6 hours, and continue montelukast 10 milligrams daily  Check peak flow, unknown baseline but will establish current  flow to monitor his clinical course    For this patient's height and weight normal values would be expected around 640  His oxygen saturation has been adequate at 97 percent on room air and he is not requiring oxygen at the moment  His CBC from last night is remarkable for eosinophilia at 10 percent  IgE levels were increased last night at 160, this is down from his prior reading on January 3, 2016 when his IgG was 236  Will consult pulmonology  CBC and BMP for this visit are pending  If symptoms worsen will obtain ABG  He is tachycardic in the 110s, suspect this  due to his nebulizer treatment    2) periumbilical abdominal pain with nausea and vomiting  Possible that this is allergic reaction in the setting of nausea vomiting and diarrhea with his skin rash  Pain has improved after treatment in the ER, will monitor at this time   Continue Zofran 4 milligrams IV q 6 hours    3) nasal polyps with chronic sinusitis  Patient has Samter's  triad of aspirin intolerance, nasal polyps and atopic asthma  Will continue his fluticasone here in the hospital          Code Status: Level 1 - Full Code  VTE Pharmacologic Prophylaxis: Enoxaparin (Lovenox)   VTE Mechanical Prophylaxis: sequential compression device  Admission Status: OBSERVATION    Admission Time  I spent 1 hour admitting the patient  This involved direct patient contact where I performed a full history and physical, reviewing previous records, and reviewing laboratory and other diagnostic studies      Coirnne Reese, DO  Internal Medicine  PGY-1

## 2017-10-22 NOTE — RESPIRATORY THERAPY NOTE
RT Protocol Note  Coral Bear 28 y o  male MRN: [id number]  Unit/Bed#: [facility] 422-01 Encounter: [id number]    Assessment     Principal Problem: Allergic atopic asthma, severe persistent, with acute exacerbation  Active Problems:    Aspirin-sensitive asthma with nasal polyps    Periumbilical abdominal pain      Home Pulmonary Medications:  Albuterol MDI    Past Medical History:   Diagnosis Date    Aspirin-sensitive asthma with nasal polyps 10/21/2017    Asthma     Chronic sinusitis      Social History     Social History    Marital status: Single     Spouse name: N/A    Number of children: N/A    Years of education: N/A     Social History Main Topics    Smoking status: Never Smoker    Smokeless tobacco: Current User    Alcohol use No    Drug use:       Comment: occasinal marijuana    Sexual activity: Not Asked     Other Topics Concern    None     Social History Narrative    None       Subjective     Subjective Data: Will order TID neb treatments    Objective     Physical Exam:   Assessment Type: Assess only  General Appearance: Alert  Respiratory Pattern: Normal  Chest Assessment: Chest expansion symmetrical  Bilateral Breath Sounds: Clear, Diminished  Subjective Data: Will order TID neb treatments    Vitals:  Blood pressure 113/56, pulse (!) 113, temperature 98 5 °F (36 9 °C), temperature source Axillary, resp  rate 18, height 6' (1 829 m), weight 95 3 kg (210 lb), SpO2 99 %  Imaging and other studies: I have personally reviewed pertinent reports  Plan TID neb treatments ordered    Respiratory Plan: Mild Distress pathway        Resp Comments: pt assessed per resp protocol  Pt presents with Asthma exacerbation  Pt had multiple neb treatments and continuous neb in ER  Currently pt in no resp distress  Pt states his breathing is 100% times better  No wheezing noted on exam, BS clear  SpO2 99% on room air   Will order TID neb treatments and continue to monitor

## 2017-10-22 NOTE — PROGRESS NOTES
IM Residency Progress Note   Unit/Bed#: [facility] 422-01 Encounter: [id number]  SOD Team A      Kenneth Barone 28 y o  male [id number]    Hospital Stay Days: 1    Assessment/Plan:    Principal Problem: Allergic atopic asthma, severe persistent, with acute exacerbation  Active Problems:    Aspirin-sensitive asthma with nasal polyps    Periumbilical abdominal pain    Severe persistent asthma with acute exacerbation - likely secondary to flare of chronic sinusitis  Patient reported having head cold like symptoms leading up to this  Patient reports marked improvement  Peak flow 550 and no wheezing on exam   - pulmonology consult pending  - decrease solumedrol to 40mg IV BID  - continue home regimen of advair and singulair   - respiratory protocol  - continue xopenex nebulizer treatments q6 hours PRN  - influenza vaccine before discharge    Periumbilical abdominal pain with nausea and vomiting - improved  Likely due to mild gastroenteritis  - continue zofran PRN    Nasal polyps with chronic sinusitis - likely related to severe allergies  IgE 160  Follows with Dr Harman Medrano of ENT  Was scheduled for CT of the sinuses  - follow up with ENT and schedule OP CT    Tachycardia - likely secondary to nebulization treatments  - monitor vitals    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 - patient has been on Paxil for last 2 years with control of his anxiety  He would like to try weaning down on the medication  - decrease dose of paxil from 10 to 5mg daily with plans to taper off and discontinue    Disposition: Pending pulmonary consult patient may be ready for discharge later in the day  Will follow up with ENT and Pulmonary as an OP  Subjective:   Patient reports feeling markedly improved from last night  He had just completed a nebulizer treatment and performed peak flow of 550  His abdominal pain and nausea has improved  He reports that he had URI symptoms leading up to his presentation to the ED with + sick contacts  He is following with Dr Opal Joseph of ENT for chronic sinusitis and followed with Dr Jose Cruz Colon in the past for pulmonary OP follow up  Vitals: Temp (24hrs), Av 1 °F (36 7 °C), Min:97 6 °F (36 4 °C), Max:98 5 °F (36 9 °C)  Current: Temperature: 98 5 °F (36 9 °C)  Vitals:    10/21/17 2102 10/21/17 2115 10/21/17 2230 10/21/17 2325   BP:  130/61 113/56    Pulse:  (!) 110 (!) 113    Resp:  18 18    Temp:   98 5 °F (36 9 °C)    TempSrc:   Axillary    SpO2: 100% 100% 97% 99%   Weight:   95 3 kg (210 lb)    Height:   6' (1 829 m)     Body mass index is 28 48 kg/m²  I/O last 24 hours:   In: 1000 [IV Piggyback:1000]  Out: 450 [Urine:450]      Physical Exam: General appearance: alert, appears stated age and cooperative  Neck: supple, symmetrical, trachea midline  Lungs: clear to auscultation bilaterally  Chest wall: no tenderness  Skin: Skin color, texture, turgor normal  No rashes or lesions  Neurologic: Grossly normal     Invasive Devices     Peripheral Intravenous Line            Peripheral IV 10/22/17 Left Antecubital less than 1 day                          Labs:   Recent Results (from the past 24 hour(s))   ECG 12 lead    Collection Time: 10/21/17  7:46 PM   Result Value Ref Range    Ventricular Rate 100 BPM    Atrial Rate 100 BPM    AL Interval 144 ms    QRSD Interval 96 ms    QT Interval 340 ms    QTC Interval 438 ms    P Axis 71 degrees    QRS Axis 75 degrees    T Wave Axis 68 degrees   Basic metabolic panel    Collection Time: 10/22/17  4:43 AM   Result Value Ref Range    Sodium 139 136 - 145 mmol/L    Potassium 4 0 3 5 - 5 3 mmol/L    Chloride 105 100 - 108 mmol/L    CO2 26 21 - 32 mmol/L    Anion Gap 8 4 - 13 mmol/L    BUN 14 5 - 25 mg/dL    Creatinine 1 14 0 60 - 1 30 mg/dL    Glucose 175 (H) 65 - 140 mg/dL    Calcium 8 6 8 3 - 10 1 mg/dL    eGFR 87 ml/min/1 73sq m   CBC (With Platelets)    Collection Time: 10/22/17  4:43 AM   Result Value Ref Range    WBC 18 47 (H) 4 31 - 10 16 Thousand/uL    RBC 4 33 3 88 - 5 62 Million/uL    Hemoglobin 13 8 12 0 - 17 0 g/dL    Hematocrit 39 5 36 5 - 49 3 %    MCV 91 82 - 98 fL    MCH 31 9 26 8 - 34 3 pg    MCHC 34 9 31 4 - 37 4 g/dL    RDW 13 4 11 6 - 15 1 %    Platelets 263 650 - 373 Thousands/uL    MPV 11 5 8 9 - 12 7 fL       Radiology Results: I have personally reviewed pertinent reports  Other Diagnostic Testing:   I have personally reviewed pertinent reports          Active Meds:   Current Facility-Administered Medications   Medication Dose Route Frequency    acetaminophen (TYLENOL) tablet 650 mg  650 mg Oral Q6H PRN    albuterol (PROVENTIL HFA,VENTOLIN HFA) inhaler 2 puff  2 puff Inhalation Q6H PRN    enoxaparin (LOVENOX) subcutaneous injection 40 mg  40 mg Subcutaneous Daily    EPINEPHrine (ADRENALIN) 1 mg/mL injection 0 3 mg  0 3 mg Intramuscular PRN    fluticasone-salmeterol (ADVAIR) 250-50 mcg/dose inhaler 1 puff  1 puff Inhalation Daily    levalbuterol (XOPENEX) inhalation solution 1 25 mg  1 25 mg Nebulization Q6H PRN    And    sodium chloride 0 9 % inhalation solution 3 mL  3 mL Nebulization Q6H PRN    levalbuterol (XOPENEX) inhalation solution 1 25 mg  1 25 mg Nebulization TID    methylPREDNISolone sodium succinate (Solu-MEDROL) injection 40 mg  40 mg Intravenous Q6H JACKIE    montelukast (SINGULAIR) tablet 10 mg  10 mg Oral HS    ondansetron (ZOFRAN) injection 4 mg  4 mg Intravenous Q6H PRN    PARoxetine (PAXIL) tablet 10 mg  10 mg Oral Daily    sodium chloride 0 9 % inhalation solution 3 mL  3 mL Nebulization TID         VTE Pharmacologic Prophylaxis: Enoxaparin (Lovenox)  VTE Mechanical Prophylaxis: sequential compression device    Jason Lagos DO

## 2017-10-22 NOTE — PLAN OF CARE
DISCHARGE PLANNING     Discharge to home or other facility with appropriate resources Progressing        Knowledge Deficit     Patient/family/caregiver demonstrates understanding of disease process, treatment plan, medications, and discharge instructions Progressing        Potential for Falls     Patient will remain free of falls Progressing        SAFETY ADULT     Maintain or return to baseline ADL function Progressing     Maintain or return mobility status to optimal level Progressing

## 2017-10-22 NOTE — PROGRESS NOTES
[facility] Senior Admission Note   Unit/Bed # @DBLINK (JO-ANN,56031)@ Encounter: [id number]  SOD Team A          Coral Bear 28 y o  male [id number]       Patient seen and examined  Reviewed H&P per Dr Landaverde Record  Agree with the assessment and plan except NA    65-year-old male patient with only significant history for asthma that was well controlled on Advair and singular, patient states he is complaint with medication, has history of difficult to control asthma with a previous admission to [facility] 2 years ago that required intubation, patient states he was following with a doctor in the [place] area  but since his insurance changed he has not been able to establish care with a doctor on his network  Patient was seen in the emergency department yesterday due to an asthma exacerbation but the patient requested to be discharged home and agreed to come back in case he would not improve, this morning patient continued to have shortness of breath along with a skin rash and decided to come to the ED again to be evaluated, patient states he has been using his rescue albuterol inhaler daily over the last week along with his Advair and singular without getting any relief, currently patient is able to talk full sentences, continues to have mild wheezing but still continues having shortness of breath      Assessment/Plan: Principal Problem:     Allergic atopic asthma, severe persistent, with acute exacerbation:  Patient presenting with severe persistent asthma, has been using rescue inhaler daily as well as his medication Advair and Singulair, will continue with Xopenex and albuterol nebulizations with respiratory protocol, Solu-Medrol q 6 hours, peak flow q 6 hours p r n , pulmonology consult since the patient continues with severe persistent asthma  Active Problems:    Aspirin-sensitive asthma with nasal polyps:  Patient presenting with Samter triad, will avoid aspirin         Disposition:  OBSERVATION    Expected LOS: <2 [phone]

## 2017-10-22 NOTE — ED PROVIDER NOTES
History  Chief Complaint   Patient presents with    Shortness of Breath     patient was seen yesterday for SOB and abdominal pain  patient complains for the same symptoms today     HPI    This is a 79-year-old male that presents today with shortness of breath  Patient was evaluated last night regarding shortness of breath and abdominal pain he was diagnosed with anaphylaxis in asthma exacerbation he received epinephrine, steroids, breathing treatments, magnesium, Benadryl and had complete resolution of symptoms  Patient did not want stay at the hospital was discharged home  Patient states today the same exact episode happen where he started having epigastric pain vomited abdominal pain along with shortness of breath wheezing and chest pain  Patient states this is normally how his allergic reactions along with asthma started  Patient has been intubated in the past but 2 years ago for an asthma exacerbation  He denies any history of blood clots or PE  Denies any cough or congestion  States no lower extremity edema  No fevers or chill  79-year-old male that presents with asthma exacerbation versus anaphylaxis  Will do the same exact treatment and admit patient to the hospital   Most likely rebound asthma exacerbation    Prior to Admission Medications   Prescriptions Last Dose Informant Patient Reported? Taking?    EPINEPHrine (EPIPEN) 0 3 mg/0 3 mL SOAJ   No No   Sig: Inject 0 3 mL into the shoulder, thigh, or buttocks once for 1 dose   PARoxetine (PAXIL) 10 mg tablet   Yes No   Sig: Take 10 mg by mouth daily   albuterol (PROVENTIL HFA,VENTOLIN HFA) 90 mcg/act inhaler   Yes No   Sig: Inhale 2 puffs every 6 (six) hours as needed for wheezing   cetirizine (ZyrTEC) 10 mg tablet   Yes No   Sig: Take 10 mg by mouth daily   fluticasone-salmeterol (ADVAIR) 250-50 mcg/dose inhaler   Yes No   Sig: Inhale 1 puff daily   montelukast (SINGULAIR) 10 mg tablet   Yes No   Sig: Take 10 mg by mouth daily at bedtime Facility-Administered Medications: None       Past Medical History:   Diagnosis Date    Aspirin-sensitive asthma with nasal polyps 10/21/2017    Asthma     Chronic sinusitis        Past Surgical History:   Procedure Laterality Date    SKIN GRAFT         Family History   Problem Relation Age of Onset    No Known Problems Mother     No Known Problems Father      I have reviewed and agree with the history as documented  Social History   Substance Use Topics    Smoking status: Never Smoker    Smokeless tobacco: Current User    Alcohol use No        Review of Systems   Constitutional: Negative  HENT: Negative  Eyes: Negative  Respiratory: Positive for chest tightness, shortness of breath and wheezing  Cardiovascular: Positive for chest pain  Gastrointestinal: Positive for abdominal pain and nausea  Genitourinary: Negative  Skin: Negative  Neurological: Negative  Hematological: Negative  Psychiatric/Behavioral: Negative  All other systems reviewed and are negative  Physical Exam  ED Triage Vitals   Temperature Pulse Respirations Blood Pressure SpO2   10/21/17 1956 10/21/17 1956 10/21/17 1956 10/21/17 1956 10/21/17 1956   97 6 °F (36 4 °C) (!) 107 20 (!) 174/93 94 %      Temp Source Heart Rate Source Patient Position - Orthostatic VS BP Location FiO2 (%)   10/21/17 1956 10/21/17 1956 10/21/17 1956 10/21/17 1956 --   Tympanic Monitor Lying Right arm       Pain Score       10/21/17 2230       No Pain           Physical Exam   Constitutional: He is oriented to person, place, and time  He appears well-developed and well-nourished  He appears distressed  HENT:   Head: Normocephalic  Mouth/Throat: Oropharynx is clear and moist    Eyes: Conjunctivae and EOM are normal  Pupils are equal, round, and reactive to light  Neck: Normal range of motion  Neck supple  Cardiovascular: Normal rate, regular rhythm and normal heart sounds  No murmur heard    Pulmonary/Chest: He is in respiratory distress  He has wheezes  Increased work of breathing   Abdominal: Soft  Bowel sounds are normal  He exhibits no distension  There is no tenderness  Musculoskeletal: Normal range of motion  He exhibits no edema or deformity  Neurological: He is alert and oriented to person, place, and time  Skin: Skin is warm  Capillary refill takes less than 2 seconds  He is not diaphoretic  Psychiatric: He has a normal mood and affect  Vitals reviewed        ED Medications  Medications   albuterol (PROVENTIL HFA,VENTOLIN HFA) inhaler 2 puff (not administered)   fluticasone-salmeterol (ADVAIR) 250-50 mcg/dose inhaler 1 puff (not administered)   montelukast (SINGULAIR) tablet 10 mg (10 mg Oral Given 10/21/17 2235)   PARoxetine (PAXIL) tablet 10 mg (not administered)   ondansetron (ZOFRAN) injection 4 mg (not administered)   methylPREDNISolone sodium succinate (Solu-MEDROL) injection 40 mg (40 mg Intravenous Given 10/22/17 0049)   enoxaparin (LOVENOX) subcutaneous injection 40 mg (not administered)   acetaminophen (TYLENOL) tablet 650 mg (not administered)   levalbuterol (XOPENEX) inhalation solution 1 25 mg (not administered)     And   sodium chloride 0 9 % inhalation solution 3 mL (not administered)   EPINEPHrine (ADRENALIN) 1 mg/mL injection 0 3 mg (not administered)   levalbuterol (XOPENEX) inhalation solution 1 25 mg (not administered)   sodium chloride 0 9 % inhalation solution 3 mL (not administered)   sodium chloride 0 9 % bolus 1,000 mL (0 mL Intravenous Stopped 10/21/17 2314)   EPINEPHrine (ADRENALIN) 1 mg/mL injection 0 3 mg (0 3 mg Intramuscular Given 10/21/17 2003)   diphenhydrAMINE (BENADRYL) injection 25 mg (25 mg Intravenous Given 10/21/17 2007)   methylPREDNISolone sodium succinate (Solu-MEDROL) injection 125 mg (125 mg Intravenous Given 10/21/17 2013)   ipratropium (ATROVENT) 0 02 % inhalation solution 0 5 mg (0 5 mg Nebulization Given 10/21/17 2004)   albuterol inhalation solution 5 mg (5 mg Nebulization Given 10/21/17 2004)   magnesium sulfate 2 g/50 mL IVPB (premix) 2 g (0 g Intravenous Stopped 10/21/17 2030)   ondansetron (ZOFRAN) injection 4 mg (4 mg Intravenous Given 10/21/17 2013)   albuterol inhalation solution 10 mg (10 mg Nebulization Given 10/21/17 2102)   ipratropium (ATROVENT) 0 02 % inhalation solution 1 mg (1 mg Nebulization Given 10/21/17 2102)       Diagnostic Studies  Labs Reviewed - No data to display    No orders to display       Procedures  Procedures      Phone Consults  ED Phone Contact    ED Course  ED Course as of Oct 22 0221   Sat Oct 21, 2017   2025 Reassess patient he states he feels slightly better on exam patient has increased and wheezing  Currently on his DuoNeb treatment  Will reassess after finishing treatment    2103 Patient states he feels much better  Still some persistent wheezing bilaterally                                MDM  CritCare Time    Disposition  Final diagnoses:   Anaphylaxis   Asthma exacerbation   Failure of outpatient treatment     ED Disposition     ED Disposition Condition Comment    Admit  Case was discussed with sod and the patient's admission status was agreed to be Admission Status: observation status to the service of Dr Aarti Pitts           Follow-up Information    None       Current Discharge Medication List      CONTINUE these medications which have NOT CHANGED    Details   albuterol (PROVENTIL HFA,VENTOLIN HFA) 90 mcg/act inhaler Inhale 2 puffs every 6 (six) hours as needed for wheezing      cetirizine (ZyrTEC) 10 mg tablet Take 10 mg by mouth daily      EPINEPHrine (EPIPEN) 0 3 mg/0 3 mL SOAJ Inject 0 3 mL into the shoulder, thigh, or buttocks once for 1 dose  Qty: 0 3 mL, Refills: 0      fluticasone-salmeterol (ADVAIR) 250-50 mcg/dose inhaler Inhale 1 puff daily      montelukast (SINGULAIR) 10 mg tablet Take 10 mg by mouth daily at bedtime      PARoxetine (PAXIL) 10 mg tablet Take 10 mg by mouth daily           No discharge procedures on file     ED Provider  Attending physically available and evaluated Ac Noble  ANA managed the patient along with the ED Attending      Electronically Signed by       Tracy Rivas MD  Resident  10/22/17 5134

## 2017-10-22 NOTE — DISCHARGE SUMMARY
[facility] Discharge Summary - Medical Valene Real 28 y o  male MRN: [id number]    Ele[facility] Stover [facility] 4 MED SURG/SD [facility] Encounter: [id number]    BRIEF OVERVIEW    Admitting Provider: Pau Best MD  Discharge Provider: Pau Best MD  Primary Care Physician at Discharge: Patient will establish care with PCP by calling insurance company    Discharge To: Home   Facility / Family Member Name: N/A    Admission Date: 10/21/2017     Discharge Date: 10/22/17    Primary Discharge Diagnosis  Principal Problem: Allergic atopic asthma, severe persistent, with acute exacerbation  Active Problems:    Aspirin-sensitive asthma with nasal polyps    Periumbilical abdominal pain    Acute respiratory failure with hypoxia (HCC)    Failure of outpatient treatment    URI (upper respiratory infection)  Resolved Problems:    * No resolved hospital problems  *      Other Problems Addressed: none    Consulting Providers   Pulmonology - Jose Luis Story MD         Therapeutic Operative Procedures Performed  none    Diagnostic Procedures Performed  CXR 10/21/17: IMPRESSION:  No acute cardiopulmonary disease, stable    Discharge Disposition: Home/Self Care  Discharged With Lines: no    Test Results Pending at Discharge: none    Outpatient Follow-Up  Patient is in between PCPs due to the fact that he has new insurance  He will establish care upon discharge    Follow up with consulting providers  Follow up with Dr Juli Porter - pulmonology  Follow up with Dr Charley Ramachandran - ENT  Active Issues Requiring Follow-up   chronic sinusitis with OP CT scan    Code Status: Level 1 - Full Code  Advance Directive and Living Will: <no information>  Power of :    POLST:      Medications   Paxil 5mg daily with plans to taper off  Prednisone 40mg, decrease by 10mg every other day, then stop  Singulair 10mg daily at bedtime  Advair 250-50 mcg/dose 1 puff daily  Albuterol inhaler PRN      Allergies  Allergies   Allergen Reactions    Aspirin Other (See Comments)     Respiratory tightness      Cat Hair Extract     Fexofenadine-Pseudoephed Er     Shellfish Allergy      Discharge Diet: regular diet  Activity restrictions: none    [address] [facility] Course  This is a 76-year-old male with history of severe allergic asthma, nasal polyps, chronic sinusitis and multiple allergies who presented on 10/21/17 with abdominal pain, nausea, vomiting and shortness of breath  The patient presented for the same complaints the previous night to the ED reported the symptoms felt similar to previous allergic reactions  The patient rapidly improved and was discharged home after he felt improved  Once home the symptoms recurred and he returned to the ED for evaluation  The patient has a history of previous intubation for asthma exacerbation 2 years ago  Leading up to these events the patient reports he had URI symptoms and increasing need for his albuterol inhaler  His maintenance medications include Singulair and Advair for which he has been compliant  Patient was started on IV steroids and nebulization treatments  He was treated with p r n  Zofran for nausea and vomiting  Overnight he improved and the next morning his peak flow was 550  Pulmonary team evaluated the patient while he was hospitalized  His steroids were tapered down and he was determined to be stable for discharge on 10/22/2017  The patient has been following with ENT and an allergist as an outpatient  He had a CT scan ordered to evaluate his chronic sinusitis  He states that he has been busy at work and unable to follow-up on this  He was treated with a 2 week course of oral steroids approximately 2 months ago for his chronic sinusitis  Since that time he has not been on any steroids  Severe persistent asthma with acute exacerbation - likely secondary to flare of chronic sinusitis and URI    Patient reported having head cold like symptoms leading up to this  Patient reports marked improvement  Peak flow 550 and no wheezing on exam on the day of discharge  - continue home regimen of advair and singulair with PRN albuterol  - short course of prednisone taper     Periumbilical abdominal pain with nausea and vomiting - improved  Likely due to mild gastroenteritis vs food intolerance     Nasal polyps with chronic sinusitis - likely related to severe allergies  IgE 160  Follows with Dr Arden Heck of ENT  Was scheduled for CT of the sinuses  - follow up with ENT and schedule OP CT     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 - patient has been on Paxil for last 2 years with control of his anxiety  He would like to try weaning down on the medication  - decrease dose of paxil from 10 to 5mg daily with plans to taper off and discontinue    Presenting Problem/History of Present Illness  Principal Problem: Allergic atopic asthma, severe persistent, with acute exacerbation  Active Problems:    Aspirin-sensitive asthma with nasal polyps    Periumbilical abdominal pain    Acute respiratory failure with hypoxia (HCC)    Failure of outpatient treatment    URI (upper respiratory infection)  Resolved Problems:    * No resolved hospital problems  *        Other Pertinent Test Results  None     Discharge Condition: fair      Discharge  Statement   I spent 30 minutes minutes discharging the patient  This time was spent on the day of discharge  I had direct contact with the patient on the day of discharge  Additional documentation is required if more than 30 minutes were spent on discharge

## 2017-10-22 NOTE — CONSULTS
Consultation - Pulmonary Medicine   Gavino Preciado 28 y o  male MRN: [id number]  Unit/Bed#: [facility] 422-01 Encounter: [id number]      Assessment:  Acute asthma exacerbation  Likely  ? food allergy lobster   Moderate persistent asthma by history, status post intubation for asthma exacerbation in the past   Nasal polyps    Plan:   The unclear what exacerbated his asthma, to me it looks more like the food allergy that he had because it started with nausea and vomiting and abdominal pain, and shortness of breath, relieved with epi pen in the ER  His peak flow this morning has been 550  Agree with continuing the nebulizer treatments  He can get his Solu-Medrol 40 mg q 12h for today and he can be switched to 40 mg a daily in a m , can be switched to p o  He will continue with his long-acting medications Advair 250/51 puff twice daily  Continue with Singulair 10 mg daily  He will follow up with pulmonary Dr Isrrael Park,   The the for PFTs, as well as respiratory past allergy panel with IgE   Follow-up with allergist   Follow-up with ENT, nasal polyps and chronic sinusitis  He can be discharged home later today after he receives his 2nd dose of Solu-Medrol and switch to p o  Prednisone 40 mg daily to be tapered down from tomorrow morning  And follow up with pulmonary ENT and Allergy immunology  History of Present Illness   Physician Requesting Consult: yKlee Whalen MD  Reason for Consult / Principal Problem:  Asthma  Hx and PE limited by:  None  HPI: Gavino Preciado is a 28y o  year old male who has history of asthma, no PFTs in the system here, use by history seems to be moderate persistent, prior history of intubation for asthma exacerbation 2 years ago, after which he states he has been on his maintenance medications on Advair initially on 500/50 a year ago he was brought down to 250/50 and he has been using it consistently  Also uses Singulair daily    States weather changes bring down his asthma, pollen, dust   Does have a dog at home for several years and he states it does not bother his asthma  The also states a few years ago he was in [place], and had lobster, and had abdominal pain nausea vomiting and feeling as if his throat was closing up, which time he has not been to any hospital it slowly resolved on its own  Two days ago on Friday night he did have lobster he states, the same night he developed the diffuse abdominal pain and throwing up, and feeling of shortness of Breath, which worsened last night for which she came into the ER  He states he was given EpiPen after which she felt better  He did have 2 episodes of for throwing up in the ER  Today he states his abdominal pain has completely resolved no nausea no vomiting this morning, and also uses shortness of breath has significantly improved  He works in [employer]  Consults    Review of Systems 12 point review of the system has been negative except for what has been documented in the HPI  Historical Information   Past Medical History:   Diagnosis Date    Aspirin-sensitive asthma with nasal polyps 10/21/2017    Asthma     Chronic sinusitis      Past Surgical History:   Procedure Laterality Date    SKIN GRAFT       Social History   History   Alcohol Use No     History   Drug Use     Comment: occasional marijuana     History   Smoking Status    Never Smoker   Smokeless Tobacco    Current User     Occupational History:  Information technology,     Family History:  2 nieces with asthma, also his half sister passed away with asthma he states  Meds/Allergies   all current active meds have been reviewed    Allergies   Allergen Reactions    Aspirin Other (See Comments)     Respiratory tightness      Cat Hair Extract     Fexofenadine-Pseudoephed Er     Shellfish Allergy        Objective   Vitals: Blood pressure 117/56, pulse 85, temperature 98 2 °F (36 8 °C), temperature source Oral, resp   rate 18, height 6' (1 829 m), weight 95 3 kg (210 lb), SpO2 94 %  ,Body mass index is 28 48 kg/m²  Intake/Output Summary (Last 24 hours) at 10/22/17 0932  Last data filed at 10/22/17 0522   Gross per 24 hour   Intake             1000 ml   Output              450 ml   Net              550 ml     Invasive Devices     Peripheral Intravenous Line            Peripheral IV 10/22/17 Left Antecubital less than 1 day                Physical Exam   Not in any acute respiratory distress  Eyes anicteric sclera, conjunctivae is clear  ENT nares is patent, no evidence of any discharge  Lungs bilateral vesicular breath sounds clear to auscultation no rhonchi  Heart first and second heart sounds are heard no murmur or gallop is heard  Abdomen soft nontender bowel sounds are present  Extremities no finger clubbing no cyanosis no pedal edema  CNS awake alert oriented x3    Lab Results: I have personally reviewed pertinent lab results  Imaging Studies: I have personally reviewed pertinent reports  and I have personally reviewed pertinent films in PACS  EKG, Pathology, and Other Studies: I have personally reviewed pertinent reports      VTE Prophylaxis: Sequential compression device Jacinto Keita)     Code Status: Level 1 - Full Code  Advance Directive and Living Will:      Power of :    POLST:

## 2017-10-22 NOTE — CASE MANAGEMENT
Initial Clinical Review    Admission: Date/Time/Statement: 10/21/17 @ 2113 -- OBS    Orders Placed This Encounter   Procedures    Place in Observation (expected length of stay for this patient is less than two midnights)     Standing Status:   Standing     Number of Occurrences:   1     Order Specific Question:   Admitting Physician     Answer:   Krystal Ruby     Order Specific Question:   Level of Care     Answer:   Med Surg [16]     ED: Date/Time/Mode of Arrival:   ED Arrival Information     Expected Arrival Acuity Means of Arrival Escorted By Service Admission Type    - 10/21/2017 19:38 Emergent Walk-In Self General Medicine Emergency    Arrival Complaint    asthma attack          Chief Complaint:   Chief Complaint   Patient presents with    Shortness of Breath     patient was seen yesterday for SOB and abdominal pain  patient complains for the same symptoms today       History of Illness:  28 y o  male with a past medical history of nasal polyps, aspirin intolerance, uncontrolled asthma with respiratory failure requiring intubation who presents with acute exacerbation of shortness of breath of onset 2 days ago  He was seen last night in the ER for symptoms including periumbilical pain, nausea vomiting, diarrhea with shortness of breath  This felt similar to his past allergic reactions and he was treated for anaphylaxis last night  Patient had improved dramatically yesterday and so was sent home and was feeling well earlier today until symptoms recurred  He notes that this time he had a diffuse rash that looked like hives however was nonpruritic  He does not recognize any trigger to his symptoms  His asthma medication include Singulair, Advair, and albuterol inhaler which he has had to use frequently over the past week  He is compliant with his medications  He denies any fever, chills, myalgias  He has been around several ill contacts at home  He denies influenza vaccine this year    He has a history of prior intubation for severe asthma with respiratory failure  He was last hospitalized for asthma exacerbation around January of this year at [facility]  He has a history of many environmental and animal allergies though has never required use of EPIpen  He has seen ENT and was told that he has chronic sinusitis with nasal polyps  He does have aspirin intolerance  The patient is a lifetime nonsmoker, however he does smoke marijuana infrequently      ED Vital Signs:   ED Triage Vitals   Temperature Pulse Respirations Blood Pressure SpO2   10/21/17 1956 10/21/17 1956 10/21/17 1956 10/21/17 1956 10/21/17 1956   97 6 °F (36 4 °C) (!) 107 20 (!) 174/93 94 %      Temp Source Heart Rate Source Patient Position - Orthostatic VS BP Location FiO2 (%)   10/21/17 1956 10/21/17 1956 10/21/17 1956 10/21/17 1956 --   Tympanic Monitor Lying Right arm       Pain Score       10/21/17 2230       No Pain        Wt Readings from Last 1 Encounters:   10/21/17 95 3 kg (210 lb)       Vital Signs (abnormal): -113, /93    Abnormal Labs/Diagnostic Test Results: CXR -- No acute cardiopulmonary disease, stable       ED Treatment:   Medication Administration from 10/21/2017 1938 to 10/21/2017 2218       Date/Time Order Dose Route Action Action by Comments     10/21/2017 2004 sodium chloride 0 9 % bolus 1,000 mL 1,000 mL Intravenous New Moon Avendano RN      10/21/2017 2003 EPINEPHrine (ADRENALIN) 1 mg/mL injection 0 3 mg 0 3 mg Intramuscular Given Paige Del Castillo RN      10/21/2017 2007 diphenhydrAMINE (BENADRYL) injection 25 mg 25 mg Intravenous Given Paige Del Castillo RN      10/21/2017 2013 methylPREDNISolone sodium succinate (Solu-MEDROL) injection 125 mg 125 mg Intravenous Given Paige Del Castillo RN      10/21/2017 2004 ipratropium (ATROVENT) 0 02 % inhalation solution 0 5 mg 0 5 mg Nebulization Given Paige Del Castillo RN      10/21/2017 2004 albuterol inhalation solution 5 mg 5 mg Nebulization Given Ros Barrientos RN                 10/21/2017 2015 magnesium sulfate 2 g/50 mL IVPB (premix) 2 g 2 g Intravenous New Bag Ros Barrientos RN      10/21/2017 2013 ondansetron (ZOFRAN) injection 4 mg 4 mg Intravenous Given oRs Barrientos RN      10/21/2017 2102 albuterol inhalation solution 10 mg 10 mg Nebulization Given Jessica Cough, RT      10/21/2017 2102 ipratropium (ATROVENT) 0 02 % inhalation solution 1 mg 1 mg Nebulization Given Jessica Cough, RT           Past Medical/Surgical History:   Past Medical History:   Diagnosis Date    Aspirin-sensitive asthma with nasal polyps 10/21/2017    Asthma     Chronic sinusitis        Admitting Diagnosis: Anaphylaxis [T78  2XXA]  Asthma attack [J45 901]  Asthma exacerbation [J45 901]  Failure of outpatient treatment [Z78 9]  Allergic atopic asthma, severe persistent, with acute exacerbation [J45 51]    Age/Sex: 28 y o  male    Assessment/Plan:   Principal Problem: Allergic atopic asthma, severe persistent, with acute exacerbation  Active Problems:    Aspirin-sensitive asthma with nasal polyps    Periumbilical abdominal pain      1) severe persistent allergic atopic asthma with acute exacerbation  Unknown trigger, patient has many environmental allergies, no fever no chills and chest x-ray is negative for acute findings there for do not suspect infectious contribution  Patient is high risk given prior intubation  Will treat with duo nebs, Solu-Medrol 40 milligrams IV q 6 hours, and continue montelukast 10 milligrams daily  Check peak flow, unknown baseline but will establish current  flow to monitor his clinical course    For this patient's height and weight normal values would be expected around 640  His oxygen saturation has been adequate at 97 percent on room air and he is not requiring oxygen at the moment  His CBC from last night is remarkable for eosinophilia at 10 percent  IgE levels were increased last night at 160, this is down from his prior reading on January 3, 2016 when his IgG was 26  Will consult pulmonology  CBC and BMP for this visit are pending  If symptoms worsen will obtain ABG  He is tachycardic in the 110s, suspect this  due to his nebulizer treatment     2) periumbilical abdominal pain with nausea and vomiting  Possible that this is allergic reaction in the setting of nausea vomiting and diarrhea with his skin rash  Pain has improved after treatment in the ER, will monitor at this time   Continue Zofran 4 milligrams IV q 6 hours     3) nasal polyps with chronic sinusitis  Patient has Samter's  triad of aspirin intolerance, nasal polyps and atopic asthma  Will continue his fluticasone here in the hospital       Admission Orders:  M/S/Tele unit  Telem  O2 2L nc --> 5l ---> RA  Respiratory protocol  Consult pulmonology  Regular diet  Peak flow q6h   venodynes b/l le     1:31 PM           PEAK FLOW: PRE TX: 640ml -- POST TX: 640ml          Scheduled Meds:   enoxaparin 40 mg Subcutaneous Daily   fluticasone-salmeterol 1 puff Inhalation Daily   levalbuterol 1 25 mg Nebulization TID   methylPREDNISolone sodium succinate 40 mg Intravenous Q12H JACKIE   montelukast 10 mg Oral HS   PARoxetine 5 mg Oral Daily   sodium chloride 3 mL Nebulization TID     Continuous Infusions:    PRN Meds:   acetaminophen    albuterol    EPINEPHrine    levalbuterol **AND** sodium chloride    ondansetron      Pulmonary consult 10/22---  Assessment:  Acute asthma exacerbation  Likely  ? food allergy lobster   Moderate persistent asthma by history, status post intubation for asthma exacerbation in the past   Nasal polyps     Plan:   The unclear what exacerbated his asthma, to me it looks more like the food allergy that he had because it started with nausea and vomiting and abdominal pain, and shortness of breath, relieved with epi pen in the ER  His peak flow this morning has been 550    Agree with continuing the nebulizer treatments  He can get his Solu-Medrol 40 mg q 12h for today and he can be switched to 40 mg a daily in a m , can be switched to p o  He will continue with his long-acting medications Advair 250/51 puff twice daily  Continue with Singulair 10 mg daily  He will follow up with pulmonary Dr Amber Parker,   The the for PFTs, as well as respiratory past allergy panel with IgE   Follow-up with allergist   Follow-up with ENT, nasal polyps and chronic sinusitis  He can be discharged home later today after he receives his 2nd dose of Solu-Medrol and switch to p o  Prednisone 40 mg daily to be tapered down from tomorrow morning    And follow up with pulmonary ENT and Allergy immunology

## 2017-11-10 ENCOUNTER — TRANSCRIBE ORDERS (OUTPATIENT)
Dept: RADIOLOGY | Facility: HOSPITAL | Age: 27
End: 2017-11-10

## 2017-11-10 ENCOUNTER — HOSPITAL ENCOUNTER (OUTPATIENT)
Dept: RADIOLOGY | Facility: HOSPITAL | Age: 27
Discharge: HOME | End: 2017-11-10
Attending: OTOLARYNGOLOGY
Payer: COMMERCIAL

## 2017-11-10 DIAGNOSIS — J32.9 CHRONIC SINUSITIS, UNSPECIFIED LOCATION: ICD-10-CM

## 2017-11-10 PROCEDURE — 70486 CT MAXILLOFACIAL W/O DYE: CPT

## 2017-11-15 ENCOUNTER — ALLSCRIPTS OFFICE VISIT (OUTPATIENT)
Dept: OTHER | Facility: OTHER | Age: 27
End: 2017-11-15

## 2017-11-16 NOTE — PROGRESS NOTES
Assessment    1  Asthma (493 90) (J45 909)   2  Chronic sinusitis (473 9) (J32 9)    Plan  Chronic sinusitis    · Claritin-D 12 Hour 5-120 MG Oral Tablet Extended Release 12 Hour; TAKE 1TABLET EVERY 12 HOURS AS NEEDED   Rx By: Lety White; Dispense: 60 Days ; #:120 Tablet Extended Release 12 Hour; Refill: 3;Chronic sinusitis; TERRY = N; Sent To: [facility]   · Fluticasone Propionate 50 MCG/ACT Nasal Suspension; USE 2 SPRAYS IN EACHNOSTRIL ONCE DAILY   Rx By: Lety White; Dispense: 30 Days ; #:1 X 16 GM Bottle; Refill: 3;For: Chronic sinusitis; TERRY = N; [facility]    Discussion/Summary  Discussion Summary:   As far as asthma control and going to continue angio on his Advair twice a day and use of Ventolin as needed  I do think his problem stem from uncontrolled allergies that is led to chronic sinus disease and eventually asthma  With regards to this I think he needs to follow-up with ENT  He certainly going to need sinus surgery  After reviewing his CAT scan of his sinuses I asked him to make another appointment to see your Nose and Throat but in the meantime to use Flonase 2 sprays each nostril twice a day as wears Claritin D twice a day and the sinus rinse daily  With regards to his anxiety as ADHD I recommended that he get a family doctor  In addition I asked him to wean off the Paxil to take 10 mg 3 times a week for several weeks and then he can discontinue the Paxil and monitor his symptoms  We will see him in follow-up in 6 months time  Counseling Documentation With Imm: The patient was counseled regarding  Goals and Barriers: The patient has the current Goals: Breathe better  The patent has the current Barriers: Sinus disease  Patient's Capacity to Self-Care: Patient is able to Self-Care  Medication SE Review and Pt Understands Tx: Possible side effects of new medications were reviewed with the patient/guardian today  Active Problems    1   Acute sinus infection (831 9) (J01 90)   2  Ankle injury (959 7) (S99 911L)   3  Asthma (493 90) (J45 909)   4  Decreased breath sounds (786 7) (R06 89)   5  Right ankle sprain (845 00) (S93 401A)    Chief Complaint  Chief Complaint Chronic Condition [facility]: Patient is here today for follow up of chronic conditions described in HPI  History of Present Illness  HPI: I am not seen Marixa Casey in the office for over 2 years, but his breathing has been stable up until recently  He was stable on the Advair and as needed beta agonist without much problem with regards to his asthma  Last month he had 2 episodes of acute bronchospasm which to come to the emergency department  He was also recently seen by [facility] Th[facility] doctor in diagnosed severe sinus disease and polyps  He still suffers from chronic sinus pressure sinus discharge and nasal congestion  We had set at his last visit 2 years ago that he was severely atopic and would qualify for Xolair injections for his allergies and asthma  Review of Systems  Complete-Male Pulm:  Constitutional: No fever or chills, feels well, no tiredness, no recent weight gain or weight loss  Eyes: no complaints of vision problems  ENT: no rhinitis, no PND, no epistaxis  Cardiovascular: no palpitations, no chest pain  Respiratory: as noted in HPI  Gastrointestinal: no complaints of esophageal reflux, no abdominal pain  Genitourinary: no urinary retention  Musculoskeletal: no arthralgias, no joint swelling, no myalgias  Integumentary: no rash, no lesions  Neurological: no headache, no fainting, no weakness  Psychiatric: no anxiety, no depression  Hematologic/Lymphatic: no complaints of swollen glands  ROS Reviewed:   ROS reviewed  Past Medical History  1  History of sudden cardiac arrest successfully resuscitated (V12 53) (Z88 76)    Surgical History  Surgical History Reviewed: The surgical history was reviewed and updated today  Family History  Family History Reviewed:    The family history was reviewed and updated today  Social History     · Former smoker (L86 67) (P28 209)   · He was a smoker, 1/2 a pack per day for 10 years  Quit February 2015  Social History Reviewed: The social history was reviewed and updated today  The social history was reviewed and is unchanged  Current Meds   1  Advair Diskus 250-50 MCG/DOSE Inhalation Aerosol Powder Breath Activated; INHALE 1 PUFF EVERY 12 HOURS; Therapy: 01LAQ5405 to (96 086801)  Requested for: 69ZPN3584; Last CR:25AOR4550 Ordered   2  Advair Diskus 250-50 MCG/DOSE Inhalation Aerosol Powder Breath Activated; INHALE 1 PUFF EVERY 12 HOURS; Therapy: 03Vab2454 to (Evaluate:67Tij8110)  Requested for: 52Pxh7626; Last Rx:99Yqp5309 Ordered   3  Albuterol Sulfate (2 5 MG/3ML) 0 083% Inhalation Nebulization Solution; USE 1 UNIT DOSE IN NEBULIZER EVERY 4 HOURS AS NEEDED; Therapy: 16EAC6761 to (Abraham Mcmahan)  Requested for: 47UUI9248; Last Rx:06Jan2017 Ordered   4  Montelukast Sodium 10 MG Oral Tablet; TAKE 1 TABLET DAILY; Therapy: 72TTE1282 to (Evaluate:18Mwc3376)  Requested for: 36NEH3942; Last MZ:89XWA4547 Ordered   5  Montelukast Sodium 10 MG Oral Tablet; TAKE 1 TABLET DAILY; Therapy: 08PIE9259 to (Last Rx:88Ujo4729)  Requested for: 66PKN7345 Ordered   6  PARoxetine HCl - 20 MG Oral Tablet; TAKE 1 TABLET DAILY AS DIRECTED; Therapy: 28WIN1921 to (Evaluate:01Xce7980)  Requested for: 44Fma5008; Last Rx:39Qew2067 Ordered   7  PARoxetine HCl - 20 MG Oral Tablet; TAKE 1 TABLET DAILY; Therapy: 93Ekt9275 to (Last Rx:37Fvu8473)  Requested for: 29Pwo7225 Ordered   8  Ventolin  (90 Base) MCG/ACT Inhalation Aerosol Solution; INHALE 2 PUFFS EVERY 4-6 HOURS AS NEEDED; Therapy: 60YFX1491 to (Evaluate:90Dmj4789)  Requested for: 40VSI0123; Last Rx:20Jan2016 Ordered   9  Ventolin  (90 Base) MCG/ACT Inhalation Aerosol Solution; INHALE 2 PUFFS EVERY 4-6 HOURS AS NEEDED;  Therapy: 27Apr2016 to (Evaluate:26Apr2017)  Requested for: 93EDT1044; Last Rx:61Ftr0155 Ordered  Medication List Reviewed: The medication list was reviewed and updated today  Allergies  1  Allegra-D 12 Hour TB12  2  Animal dander - Cats   3  Shellfish    Vitals  Vital Signs    Recorded: 41OES5595 11:29AM   Temperature 98 3 F   Heart Rate 65   Respiration 20   Systolic 151   Diastolic 78   Height 6 ft    Weight 218 lb 8 oz   BMI Calculated 29 63   BSA Calculated 2 21   O2 Saturation 99, RA       Physical Exam   Constitutional  General appearance: No acute distress, well appearing and well nourished  Ears, Nose, Mouth, and Throat  Nasal mucosa, septum, and turbinates: Normal without edema or erythema  Lips, teeth, and gums: Normal, good dentition  Oropharynx: Normal with no erythema, edema, exudate or lesions  Neck  Neck: Supple, symmetric, trachea midline, no masses  Jugular veins: Normal    Pulmonary  Auscultation of lungs: Clear to auscultation, no rales, no crackles, no wheezing  Cardiovascular  Auscultation of heart: Normal rate and rhythm, normal S1 and S2, no murmurs  Examination of extremities for edema and/or varicosities: Normal    Abdomen  Abdomen: Soft, non-tender  Lymphatic  Palpation of lymph nodes in neck: No lymphadenopathy  Musculoskeletal  Gait and station: Normal    Digits and nails: Normal without clubbing or cyanosis  Neurologic  Mental Status: Normal  Not confused, no evidence of dementia, good comprehension, good concentration  Skin  Skin and subcutaneous tissue: Limited exam shows no rash     Psychiatric  Orientation to person, place and time: Normal    Mood and affect: Normal        Signatures   Electronically signed by : Gerri Mcgraw DO; Nov 15 2017 12:21PM EST                       (Author)

## 2017-12-08 ENCOUNTER — GENERIC CONVERSION - ENCOUNTER (OUTPATIENT)
Dept: PULMONOLOGY | Facility: HOSPITAL | Age: 27
End: 2017-12-08

## 2017-12-11 ENCOUNTER — APPOINTMENT (EMERGENCY)
Dept: RADIOLOGY | Facility: HOSPITAL | Age: 27
End: 2017-12-11
Payer: COMMERCIAL

## 2017-12-11 ENCOUNTER — HOSPITAL ENCOUNTER (EMERGENCY)
Facility: HOSPITAL | Age: 27
Discharge: HOME | End: 2017-12-11
Attending: EMERGENCY MEDICINE
Payer: COMMERCIAL

## 2017-12-11 VITALS
DIASTOLIC BLOOD PRESSURE: 58 MMHG | BODY MASS INDEX: 27.12 KG/M2 | WEIGHT: 200 LBS | RESPIRATION RATE: 20 BRPM | SYSTOLIC BLOOD PRESSURE: 116 MMHG | OXYGEN SATURATION: 94 % | TEMPERATURE: 97.9 F | HEART RATE: 110 BPM

## 2017-12-11 DIAGNOSIS — J45.901 ASTHMA EXACERBATION: Primary | ICD-10-CM

## 2017-12-11 DIAGNOSIS — J06.9 UPPER RESPIRATORY INFECTION, ACUTE: ICD-10-CM

## 2017-12-11 PROCEDURE — 71020 HB CHEST X-RAY 2VW FRONTAL&LATL: CPT

## 2017-12-11 PROCEDURE — 99283 EMERGENCY DEPT VISIT LOW MDM: CPT

## 2017-12-11 PROCEDURE — 94640 AIRWAY INHALATION TREATMENT: CPT

## 2017-12-11 RX ORDER — ALBUTEROL SULFATE 2.5 MG/3ML
5 SOLUTION RESPIRATORY (INHALATION) ONCE
Status: COMPLETED | OUTPATIENT
Start: 2017-12-11 | End: 2017-12-11

## 2017-12-11 RX ORDER — ALBUTEROL SULFATE 90 UG/1
2 AEROSOL, METERED RESPIRATORY (INHALATION) EVERY 6 HOURS PRN
Qty: 1 INHALER | Refills: 2 | Status: SHIPPED | OUTPATIENT
Start: 2017-12-11 | End: 2018-05-10 | Stop reason: SDUPTHER

## 2017-12-11 RX ORDER — PREDNISONE 20 MG/1
60 TABLET ORAL ONCE
Status: COMPLETED | OUTPATIENT
Start: 2017-12-11 | End: 2017-12-11

## 2017-12-11 RX ORDER — PREDNISONE 20 MG/1
60 TABLET ORAL DAILY
Qty: 12 TABLET | Refills: 0 | Status: SHIPPED | OUTPATIENT
Start: 2017-12-11 | End: 2017-12-15

## 2017-12-11 RX ORDER — ALBUTEROL SULFATE 2.5 MG/3ML
2.5 SOLUTION RESPIRATORY (INHALATION) EVERY 6 HOURS PRN
Qty: 75 ML | Refills: 0 | Status: SHIPPED | OUTPATIENT
Start: 2017-12-11 | End: 2017-12-21

## 2017-12-11 RX ADMIN — ALBUTEROL SULFATE 5 MG: 2.5 SOLUTION RESPIRATORY (INHALATION) at 03:07

## 2017-12-11 RX ADMIN — IPRATROPIUM BROMIDE 0.5 MG: 0.5 SOLUTION RESPIRATORY (INHALATION) at 03:07

## 2017-12-11 RX ADMIN — PREDNISONE 60 MG: 20 TABLET ORAL at 03:07

## 2017-12-11 NOTE — DISCHARGE INSTRUCTIONS
Asthma, Ambulatory Care   GENERAL INFORMATION:   Asthma  is a lung disease that makes breathing difficult  Chronic inflammation and reactions to triggers narrow the airways in your lungs  Asthma can become life-threatening if it is not managed  Common symptoms include the following:   · Coughing     · Wheezing     · Shortness of breath     · Chest tightness  Seek immediate care for the following symptoms:   · Severe shortness of breath    · Blue or gray lips or nails    · Skin around your neck and ribs pulls in with each breath    · Shortness of breath, even after you take your short-term medicine as directed     · Peak flow numbers in the red zone of your asthma action plan  Treatment for asthma  will depend on how severe it is  Medicine may decrease inflammation, open airways, and make it easier to breathe  Medicines may be inhaled, taken as a pill, or injected  Short-term medicines relieve your symptoms quickly  Long-term medicines are used to prevent future attacks  You may also need medicine to help control your allergies  Manage and prevent future asthma attacks:   · Follow your asthma action pan  This is a written plan that you and your healthcare provider create  It explains which medicine you need and when to change doses if necessary  It also explains how you can monitor symptoms and use a peak flow meter  The meter measures how well your lungs are working  · Manage other health conditions , such as allergies, acid reflux, and sleep apnea  · Identify and avoid triggers  These may include pets, dust mites, mold, and cockroaches  · Do not smoke and avoid others who smoke  If you smoke, it is never too late to quit  Ask your healthcare provider if you need help quitting  · Ask about a flu vaccine  The flu can make your asthma worse  You may need a yearly flu shot  Follow up with your healthcare provider as directed:   You will need to return to make sure your medicine is working and your symptoms are controlled  You may be referred to an asthma or allergy specialist  Jacky Hickman may be asked to keep a record of your peak flow values and bring it with you to your appointments  Write down your questions so you remember to ask them during your visits  CARE AGREEMENT:   You have the right to help plan your care  Learn about your health condition and how it may be treated  Discuss treatment options with your caregivers to decide what care you want to receive  You always have the right to refuse treatment  The above information is an  only  It is not intended as medical advice for individual conditions or treatments  Talk to your doctor, nurse or pharmacist before following any medical regimen to see if it is safe and effective for you  © 2014 [address] is for End User's use only and may not be sold, redistributed or otherwise used for commercial purposes  All illustrations and images included in CareNotes® are the copyrighted property of [employer] D [employer] M [employer]  or [address]

## 2017-12-11 NOTE — ED ATTENDING ATTESTATION
I, [address], DO, saw and evaluated the patient  I have discussed the patient with the resident/non-physician practitioner and agree with the resident's/non-physician practitioner's findings, Plan of Care, and MDM as documented in the resident's/non-physician practitioner's note, except where noted  All available labs and Radiology studies were reviewed  At this point I agree with the current assessment done in the Emergency Department  I have conducted an independent evaluation of this patient a history and physical is as follows:    18-year-old male presents with asthma exacerbation  Patient has history of similar symptoms in the past   Admits to coughing recently  Did use nebulizer at home without relief  No fevers  Has been hospitalized in the past for same and has been intubated in the past   No recent steroids  Exam-no acute distress but increased work of breathing with dyspnea on conversation unable to speak in full sentences, heart tachycardic, lungs decreased with occasional wheeze bilaterally    Plan-breathing treatment, chest x-ray, steroids and re-evaluate    Critical Care Time  CritCare Time

## 2017-12-11 NOTE — ED PROVIDER NOTES
History  Chief Complaint   Patient presents with    Asthma     patient is having an asthma exaserbation     A 19-year-old male with past medical history of asthma; presents with shortness of breath  Patient reports over the past several days he has had symptoms of a cold, including a productive cough and rhinorrhea  Patient states earlier today he developed increasing shortness of breath and wheezing  Patient did take two albuterol nebulizers with minimal relief of his symptoms  Patient denies having a fever, chills, abdominal pain, nausea, vomiting, diarrhea, chest pain, peripheral edema and rashes  Patient states that he has been admitted multiple times for his asthma and has been intubated  Patient reports the last time he was on steroids was 2 months ago  A/P:  Dyspnea, patient with significantly diminished air entry bilaterally and diffuse inspiratory and expiratory wheezing  Patient only able to speak in 2-3 word sentences at a time  O2 saturation 91% on room air  Will give DuoNeb now  Will start a prednisone burst   Will obtain chest x-ray to rule out pneumonia  History provided by:  Patient      Prior to Admission Medications   Prescriptions Last Dose Informant Patient Reported? Taking?    EPINEPHrine (EPIPEN) 0 3 mg/0 3 mL SOAJ   No No   Sig: Inject 0 3 mL into the shoulder, thigh, or buttocks once for 1 dose   PARoxetine (PAXIL) 10 mg tablet 12/10/2017 at morning  No Yes   Sig: Take 0 5 tablets by mouth daily   albuterol (PROVENTIL HFA,VENTOLIN HFA) 90 mcg/act inhaler Unknown at Unknown time  Yes No   Sig: Inhale 2 puffs every 6 (six) hours as needed for wheezing   cetirizine (ZyrTEC) 10 mg tablet Unknown at Unknown time  Yes No   Sig: Take 10 mg by mouth daily   fluticasone-salmeterol (ADVAIR) 250-50 mcg/dose inhaler 12/10/2017 at morning  Yes Yes   Sig: Inhale 1 puff daily   montelukast (SINGULAIR) 10 mg tablet 12/10/2017 at hs  Yes Yes   Sig: Take 10 mg by mouth daily at bedtime predniSONE 10 mg tablet Unknown at Unknown time  No No   Sig: Take 4 tablets daily for two days, then take 3 tablets daily for 2 day, then take 2 a tablet daily for two days, then take 1 tablet for two days, then stop  Facility-Administered Medications: None       Past Medical History:   Diagnosis Date    Aspirin-sensitive asthma with nasal polyps 10/21/2017    Asthma     Chronic sinusitis        Past Surgical History:   Procedure Laterality Date    SKIN GRAFT         Family History   Problem Relation Age of Onset    No Known Problems Mother     No Known Problems Father      I have reviewed and agree with the history as documented  Social History   Substance Use Topics    Smoking status: Never Smoker    Smokeless tobacco: Current User    Alcohol use Yes        Review of Systems   HENT: Positive for congestion  Respiratory: Positive for cough, chest tightness, shortness of breath and wheezing  All other systems reviewed and are negative        Physical Exam  ED Triage Vitals   Temperature Pulse Respirations Blood Pressure SpO2   12/11/17 0256 12/11/17 0256 12/11/17 0256 12/11/17 0256 12/11/17 0256   97 9 °F (36 6 °C) (!) 112 (!) 30 132/70 91 %      Temp Source Heart Rate Source Patient Position - Orthostatic VS BP Location FiO2 (%)   12/11/17 0256 12/11/17 0256 12/11/17 0256 12/11/17 0256 --   Oral Monitor Sitting Right arm       Pain Score       12/11/17 0500       No Pain           Orthostatic Vital Signs  Vitals:    12/11/17 0256 12/11/17 0430 12/11/17 0500   BP: 132/70 107/57 116/58   Pulse: (!) 112 (!) 114 (!) 110   Patient Position - Orthostatic VS: Sitting Lying Lying       Physical Exam   General Appearance: alert and oriented; acute respiratory distress, only able to speak in 2-3 word sentences  Skin:  Warm, dry, intact  HEENT: atraumatic, normocephalic  Neck: Supple, trachea midline  Cardiac: RRR; no murmurs, rub, gallops  Pulmonary: Acute respiratory distress, only able to speak in 2-3 word sentences  Diffuse inspiratory and expiratory wheezing, with diminished air entry bilaterally  91% O2 on room air  Gastrointestinal: abdomen soft, nontender, nondistended; no guarding or rebound tenderness; good bowel sounds, no mass or bruits  Extremities:  no pedal edema, 2+ pulses; no calf tenderness, no clubbing, no cyanosis  Neuro:  no focal motor or sensory deficits, CN 2-12 grossly intact  Psych:  Normal mood and affect, normal judgement and insight      ED Medications  Medications   albuterol inhalation solution 5 mg (5 mg Nebulization Given 12/11/17 0307)   ipratropium (ATROVENT) 0 02 % inhalation solution 0 5 mg (0 5 mg Nebulization Given 12/11/17 0307)   predniSONE tablet 60 mg (60 mg Oral Given 12/11/17 0307)       Diagnostic Studies  Results Reviewed     None                 XR chest 2 views   ED Interpretation by Courtney Barrow DO (12/11 0519)   No acute disease            Procedures  Procedures      Phone Consults  ED Phone Contact    ED Course  ED Course as of Dec 11 0630   Mon Dec 11, 2017   0406 Patient feeling better following DuoNeb  Will obtain chest x-ray to rule out pneumonia  Given patient's initial presentation, will observe for recurrence of symptoms while in the ED     0525 Pt feeling better at this time  Discussed discharge home with patient, he is in agreement with this plan  Will prescribe steroid burst and also refill albuterol solution  Return precautions given                                  MDM  CritCare Time    Disposition  Final diagnoses:   Asthma exacerbation   Upper respiratory infection, acute     Time reflects when diagnosis was documented in both MDM as applicable and the Disposition within this note     Time User Action Codes Description Comment    12/11/2017  5:27 AM Smith Butts Add [J45 901] Asthma exacerbation     12/11/2017  5:27 AM Belle Camara Add [J06 9] Upper respiratory infection, acute       ED Disposition     ED Disposition Condition Comment    Discharge  Kelli Samaniego discharge to home/self care  Condition at discharge: Good        Follow-up Information     Follow up With Specialties Details Why Zuleika Escalante   Call to establish care with a family doctor [phone]      John Montelongo DO Pulmonary Disease, Neurology, Pulmonology Schedule an appointment as soon as possible for a visit For re-evaluation Nick Lumaqco [address] [place] [address]  [phone]          Discharge Medication List as of 12/11/2017  5:29 AM      START taking these medications    Details   albuterol (2 5 mg/3 mL) 0 083 % nebulizer solution Take 3 mL by nebulization every 6 (six) hours as needed for wheezing or shortness of breath, Starting Mon 12/11/2017, Print      !! predniSONE 20 mg tablet Take 3 tablets by mouth daily for 4 days, Starting Mon 12/11/2017, Until Fri 12/15/2017, Print       !! - Potential duplicate medications found  Please discuss with provider  CONTINUE these medications which have CHANGED    Details   albuterol (PROVENTIL HFA,VENTOLIN HFA) 90 mcg/act inhaler Inhale 2 puffs every 6 (six) hours as needed for wheezing, Starting Mon 12/11/2017, Print         CONTINUE these medications which have NOT CHANGED    Details   fluticasone-salmeterol (ADVAIR) 250-50 mcg/dose inhaler Inhale 1 puff daily, Historical Med      montelukast (SINGULAIR) 10 mg tablet Take 10 mg by mouth daily at bedtime, Historical Med      PARoxetine (PAXIL) 10 mg tablet Take 0 5 tablets by mouth daily, Starting Sun 10/22/2017, Print      cetirizine (ZyrTEC) 10 mg tablet Take 10 mg by mouth daily, Historical Med      EPINEPHrine (EPIPEN) 0 3 mg/0 3 mL SOAJ Inject 0 3 mL into the shoulder, thigh, or buttocks once for 1 dose, Starting Sat 10/21/2017, Print      !! predniSONE 10 mg tablet Take 4 tablets daily for two days, then take 3 tablets daily for 2 day, then take 2 a tablet daily for two days, then take 1 tablet for two days, then stop , Print       ! ! - Potential duplicate medications found  Please discuss with provider  No discharge procedures on file  ED Provider  Attending physically available and evaluated Chas Chappell I managed the patient along with the ED Attending      Electronically Signed by         Perla Acosta DO  Resident  12/11/17 9988

## 2017-12-21 ENCOUNTER — ANESTHESIA EVENT (OUTPATIENT)
Dept: PERIOP | Facility: HOSPITAL | Age: 27
End: 2017-12-21
Payer: COMMERCIAL

## 2017-12-21 NOTE — PRE-PROCEDURE INSTRUCTIONS
Pre-Surgery Instructions:   Medication Instructions    albuterol (PROVENTIL HFA,VENTOLIN HFA) 90 mcg/act inhaler Instructed patient per Anesthesia Guidelines   fluticasone-salmeterol (ADVAIR) 250-50 mcg/dose inhaler Instructed patient per Anesthesia Guidelines   montelukast (SINGULAIR) 10 mg tablet Instructed patient per Anesthesia Guidelines  Spoke to pt  Medication list reviewed & instructed  As of 12 21 17 tylenol only  On am DOS pt to take singular & advair, will bring albuterol PRN  Showering instructions provided at time of call  All instructions verbally understood by patient  No further questions

## 2017-12-27 ENCOUNTER — ANESTHESIA (OUTPATIENT)
Dept: PERIOP | Facility: HOSPITAL | Age: 27
End: 2017-12-27
Payer: COMMERCIAL

## 2017-12-27 ENCOUNTER — HOSPITAL ENCOUNTER (OUTPATIENT)
Facility: HOSPITAL | Age: 27
Setting detail: OUTPATIENT SURGERY
Discharge: HOME | End: 2017-12-27
Attending: OTOLARYNGOLOGY | Admitting: OTOLARYNGOLOGY
Payer: COMMERCIAL

## 2017-12-27 VITALS
WEIGHT: 207.01 LBS | BODY MASS INDEX: 28.04 KG/M2 | OXYGEN SATURATION: 95 % | HEART RATE: 66 BPM | DIASTOLIC BLOOD PRESSURE: 62 MMHG | RESPIRATION RATE: 20 BRPM | SYSTOLIC BLOOD PRESSURE: 108 MMHG | TEMPERATURE: 98.6 F | HEIGHT: 72 IN

## 2017-12-27 DIAGNOSIS — J32.4 CHRONIC PANSINUSITIS: ICD-10-CM

## 2017-12-27 DIAGNOSIS — J33.9 NASAL POLYP: ICD-10-CM

## 2017-12-27 LAB
ANION GAP SERPL CALCULATED.3IONS-SCNC: 6 MMOL/L (ref 4–13)
BASOPHILS # BLD AUTO: 0.03 THOUSANDS/ΜL (ref 0–0.1)
BASOPHILS NFR BLD AUTO: 0 % (ref 0–1)
BUN SERPL-MCNC: 14 MG/DL (ref 5–25)
CALCIUM SERPL-MCNC: 9.2 MG/DL (ref 8.3–10.1)
CHLORIDE SERPL-SCNC: 107 MMOL/L (ref 100–108)
CO2 SERPL-SCNC: 28 MMOL/L (ref 21–32)
CREAT SERPL-MCNC: 0.93 MG/DL (ref 0.6–1.3)
EOSINOPHIL # BLD AUTO: 0.51 THOUSAND/ΜL (ref 0–0.61)
EOSINOPHIL NFR BLD AUTO: 6 % (ref 0–6)
ERYTHROCYTE [DISTWIDTH] IN BLOOD BY AUTOMATED COUNT: 12.9 % (ref 11.6–15.1)
GFR SERPL CREATININE-BSD FRML MDRD: 111 ML/MIN/1.73SQ M
GLUCOSE P FAST SERPL-MCNC: 92 MG/DL (ref 65–99)
GLUCOSE SERPL-MCNC: 92 MG/DL (ref 65–140)
HCT VFR BLD AUTO: 44.1 % (ref 36.5–49.3)
HGB BLD-MCNC: 15.5 G/DL (ref 12–17)
LYMPHOCYTES # BLD AUTO: 2.34 THOUSANDS/ΜL (ref 0.6–4.47)
LYMPHOCYTES NFR BLD AUTO: 29 % (ref 14–44)
MCH RBC QN AUTO: 31.8 PG (ref 26.8–34.3)
MCHC RBC AUTO-ENTMCNC: 35.1 G/DL (ref 31.4–37.4)
MCV RBC AUTO: 91 FL (ref 82–98)
MONOCYTES # BLD AUTO: 0.73 THOUSAND/ΜL (ref 0.17–1.22)
MONOCYTES NFR BLD AUTO: 9 % (ref 4–12)
NEUTROPHILS # BLD AUTO: 4.33 THOUSANDS/ΜL (ref 1.85–7.62)
NEUTS SEG NFR BLD AUTO: 56 % (ref 43–75)
NRBC BLD AUTO-RTO: 0 /100 WBCS
PLATELET # BLD AUTO: 262 THOUSANDS/UL (ref 149–390)
PMV BLD AUTO: 11.1 FL (ref 8.9–12.7)
POTASSIUM SERPL-SCNC: 4 MMOL/L (ref 3.5–5.3)
RBC # BLD AUTO: 4.87 MILLION/UL (ref 3.88–5.62)
SODIUM SERPL-SCNC: 141 MMOL/L (ref 136–145)
WBC # BLD AUTO: 7.96 THOUSAND/UL (ref 4.31–10.16)

## 2017-12-27 PROCEDURE — 87075 CULTR BACTERIA EXCEPT BLOOD: CPT | Performed by: OTOLARYNGOLOGY

## 2017-12-27 PROCEDURE — 80048 BASIC METABOLIC PNL TOTAL CA: CPT | Performed by: OTOLARYNGOLOGY

## 2017-12-27 PROCEDURE — C2625 STENT, NON-COR, TEM W/DEL SY: HCPCS | Performed by: OTOLARYNGOLOGY

## 2017-12-27 PROCEDURE — 85025 COMPLETE CBC W/AUTO DIFF WBC: CPT | Performed by: OTOLARYNGOLOGY

## 2017-12-27 PROCEDURE — 87185 SC STD ENZYME DETCJ PER NZM: CPT | Performed by: OTOLARYNGOLOGY

## 2017-12-27 PROCEDURE — 88304 TISSUE EXAM BY PATHOLOGIST: CPT | Performed by: OTOLARYNGOLOGY

## 2017-12-27 PROCEDURE — 87102 FUNGUS ISOLATION CULTURE: CPT | Performed by: OTOLARYNGOLOGY

## 2017-12-27 DEVICE — PROPEL MINI SINUS IMPLANT
Type: IMPLANTABLE DEVICE | Site: NOSE | Status: FUNCTIONAL
Brand: PROPEL MINI

## 2017-12-27 DEVICE — PROPEL SINUS IMPLANT
Type: IMPLANTABLE DEVICE | Site: NOSE | Status: FUNCTIONAL
Brand: PROPEL

## 2017-12-27 RX ORDER — PROPOFOL 10 MG/ML
INJECTION, EMULSION INTRAVENOUS AS NEEDED
Status: DISCONTINUED | OUTPATIENT
Start: 2017-12-27 | End: 2017-12-27 | Stop reason: SURG

## 2017-12-27 RX ORDER — ROCURONIUM BROMIDE 10 MG/ML
INJECTION, SOLUTION INTRAVENOUS AS NEEDED
Status: DISCONTINUED | OUTPATIENT
Start: 2017-12-27 | End: 2017-12-27 | Stop reason: SURG

## 2017-12-27 RX ORDER — FENTANYL CITRATE 50 UG/ML
INJECTION, SOLUTION INTRAMUSCULAR; INTRAVENOUS AS NEEDED
Status: DISCONTINUED | OUTPATIENT
Start: 2017-12-27 | End: 2017-12-27 | Stop reason: SURG

## 2017-12-27 RX ORDER — MAGNESIUM HYDROXIDE 1200 MG/15ML
LIQUID ORAL AS NEEDED
Status: DISCONTINUED | OUTPATIENT
Start: 2017-12-27 | End: 2017-12-27 | Stop reason: HOSPADM

## 2017-12-27 RX ORDER — PROPOFOL 10 MG/ML
INJECTION, EMULSION INTRAVENOUS CONTINUOUS PRN
Status: DISCONTINUED | OUTPATIENT
Start: 2017-12-27 | End: 2017-12-27 | Stop reason: SURG

## 2017-12-27 RX ORDER — SODIUM CHLORIDE, SODIUM LACTATE, POTASSIUM CHLORIDE, CALCIUM CHLORIDE 600; 310; 30; 20 MG/100ML; MG/100ML; MG/100ML; MG/100ML
125 INJECTION, SOLUTION INTRAVENOUS CONTINUOUS
Status: DISCONTINUED | OUTPATIENT
Start: 2017-12-27 | End: 2017-12-28 | Stop reason: HOSPADM

## 2017-12-27 RX ORDER — OXYCODONE HYDROCHLORIDE AND ACETAMINOPHEN 5; 325 MG/1; MG/1
2 TABLET ORAL EVERY 4 HOURS PRN
Status: DISCONTINUED | OUTPATIENT
Start: 2017-12-27 | End: 2017-12-28 | Stop reason: HOSPADM

## 2017-12-27 RX ORDER — LIDOCAINE HYDROCHLORIDE 10 MG/ML
INJECTION, SOLUTION INFILTRATION; PERINEURAL AS NEEDED
Status: DISCONTINUED | OUTPATIENT
Start: 2017-12-27 | End: 2017-12-27 | Stop reason: SURG

## 2017-12-27 RX ORDER — GLYCOPYRROLATE 0.2 MG/ML
INJECTION INTRAMUSCULAR; INTRAVENOUS AS NEEDED
Status: DISCONTINUED | OUTPATIENT
Start: 2017-12-27 | End: 2017-12-27 | Stop reason: SURG

## 2017-12-27 RX ORDER — ALPRAZOLAM 0.25 MG/1
0.25 TABLET ORAL 2 TIMES DAILY PRN
COMMUNITY
End: 2018-05-10 | Stop reason: ALTCHOICE

## 2017-12-27 RX ORDER — LIDOCAINE HYDROCHLORIDE AND EPINEPHRINE 10; 10 MG/ML; UG/ML
INJECTION, SOLUTION INFILTRATION; PERINEURAL AS NEEDED
Status: DISCONTINUED | OUTPATIENT
Start: 2017-12-27 | End: 2017-12-27 | Stop reason: HOSPADM

## 2017-12-27 RX ORDER — MIDAZOLAM HYDROCHLORIDE 1 MG/ML
INJECTION INTRAMUSCULAR; INTRAVENOUS AS NEEDED
Status: DISCONTINUED | OUTPATIENT
Start: 2017-12-27 | End: 2017-12-27 | Stop reason: SURG

## 2017-12-27 RX ORDER — ONDANSETRON 2 MG/ML
INJECTION INTRAMUSCULAR; INTRAVENOUS AS NEEDED
Status: DISCONTINUED | OUTPATIENT
Start: 2017-12-27 | End: 2017-12-27 | Stop reason: SURG

## 2017-12-27 RX ORDER — SUCCINYLCHOLINE CHLORIDE 20 MG/ML
INJECTION INTRAMUSCULAR; INTRAVENOUS AS NEEDED
Status: DISCONTINUED | OUTPATIENT
Start: 2017-12-27 | End: 2017-12-27 | Stop reason: SURG

## 2017-12-27 RX ORDER — ONDANSETRON 2 MG/ML
4 INJECTION INTRAMUSCULAR; INTRAVENOUS ONCE AS NEEDED
Status: DISCONTINUED | OUTPATIENT
Start: 2017-12-27 | End: 2017-12-27 | Stop reason: HOSPADM

## 2017-12-27 RX ORDER — FENTANYL CITRATE/PF 50 MCG/ML
25 SYRINGE (ML) INJECTION
Status: DISCONTINUED | OUTPATIENT
Start: 2017-12-27 | End: 2017-12-27 | Stop reason: HOSPADM

## 2017-12-27 RX ADMIN — PROPOFOL 200 MG: 10 INJECTION, EMULSION INTRAVENOUS at 17:13

## 2017-12-27 RX ADMIN — LIDOCAINE HYDROCHLORIDE 100 MG: 10 INJECTION, SOLUTION INFILTRATION; PERINEURAL at 17:13

## 2017-12-27 RX ADMIN — ROCURONIUM BROMIDE 2 MG: 10 INJECTION INTRAVENOUS at 17:13

## 2017-12-27 RX ADMIN — GLYCOPYRROLATE 0.2 MG: 0.2 INJECTION, SOLUTION INTRAMUSCULAR; INTRAVENOUS at 16:56

## 2017-12-27 RX ADMIN — FENTANYL CITRATE 100 MCG: 50 INJECTION, SOLUTION INTRAMUSCULAR; INTRAVENOUS at 17:13

## 2017-12-27 RX ADMIN — Medication 0.2 MCG/KG/MIN: at 17:15

## 2017-12-27 RX ADMIN — FENTANYL CITRATE 25 MCG: 50 INJECTION INTRAMUSCULAR; INTRAVENOUS at 19:45

## 2017-12-27 RX ADMIN — SODIUM CHLORIDE, SODIUM LACTATE, POTASSIUM CHLORIDE, AND CALCIUM CHLORIDE: .6; .31; .03; .02 INJECTION, SOLUTION INTRAVENOUS at 17:15

## 2017-12-27 RX ADMIN — OXYCODONE HYDROCHLORIDE AND ACETAMINOPHEN 2 TABLET: 5; 325 TABLET ORAL at 20:31

## 2017-12-27 RX ADMIN — FENTANYL CITRATE 25 MCG: 50 INJECTION INTRAMUSCULAR; INTRAVENOUS at 19:54

## 2017-12-27 RX ADMIN — MIDAZOLAM HYDROCHLORIDE 2 MG: 1 INJECTION, SOLUTION INTRAMUSCULAR; INTRAVENOUS at 17:00

## 2017-12-27 RX ADMIN — CEFAZOLIN SODIUM 2000 MG: 2 SOLUTION INTRAVENOUS at 17:20

## 2017-12-27 RX ADMIN — SODIUM CHLORIDE, SODIUM LACTATE, POTASSIUM CHLORIDE, AND CALCIUM CHLORIDE 125 ML/HR: .6; .31; .03; .02 INJECTION, SOLUTION INTRAVENOUS at 14:27

## 2017-12-27 RX ADMIN — PROPOFOL 140 MCG/KG/MIN: 10 INJECTION, EMULSION INTRAVENOUS at 17:15

## 2017-12-27 RX ADMIN — SUCCINYLCHOLINE CHLORIDE 140 MG: 20 INJECTION, SOLUTION INTRAMUSCULAR; INTRAVENOUS at 17:14

## 2017-12-27 RX ADMIN — ONDANSETRON 4 MG: 2 INJECTION INTRAMUSCULAR; INTRAVENOUS at 16:56

## 2017-12-27 RX ADMIN — DEXAMETHASONE SODIUM PHOSPHATE 10 MG: 10 INJECTION INTRAMUSCULAR; INTRAVENOUS at 17:13

## 2017-12-27 RX ADMIN — FENTANYL CITRATE 25 MCG: 50 INJECTION INTRAMUSCULAR; INTRAVENOUS at 19:40

## 2017-12-27 NOTE — ANESTHESIA PREPROCEDURE EVALUATION
Review of Systems/Medical History          Cardiovascular   Pulmonary  Asthma: PRN med  controlled Asthma type of rescue: daily inhaler, ,        GI/Hepatic            Endo/Other     GYN       Hematology   Musculoskeletal       Neurology   Psychology           Physical Exam    Airway    Mallampati score: I  TM Distance: >3 FB  Neck ROM: full     Dental   No notable dental hx     Cardiovascular      Pulmonary  Breath sounds clear to auscultation,     Other Findings        Anesthesia Plan  ASA Score- 2     Anesthesia Type- general with ASA Monitors  Additional Monitors:   Airway Plan: ETT  Plan Factors-    Induction- intravenous  Postoperative Plan-     Informed Consent- Anesthetic plan and risks discussed with patient  I personally reviewed this patient with the CRNA  Discussed and agreed on the Anesthesia Plan with the CRNA               Lab Results   Component Value Date    GLUCOSE 92 12/27/2017    BUN 14 12/27/2017    CALCIUM 9 2 12/27/2017     12/27/2017    CHOL 157 06/21/2016    CO2 28 12/27/2017    CREATININE 0 93 12/27/2017    HDL 63 (H) 06/21/2016    HCT 44 1 12/27/2017    HGB 15 5 12/27/2017    HGBA1C 5 4 06/21/2016     12/27/2017    K 4 0 12/27/2017     12/27/2017    TRIG 56 06/21/2016    WBC 7 96 12/27/2017

## 2017-12-28 NOTE — OP NOTE
OPERATIVE REPORT  PATIENT NAME: Sandeep Esparza    :  1990  MRN: [id number]  Pt Location: [facility] OR ROOM 05    SURGERY DATE: 2017    Surgeon(s) and Role:     * Cathy Hurley MD - Primary    Preop Diagnosis:  Nasal polyp [J33 9]  Chronic pansinusitis [J32 4]    Post-Op Diagnosis Codes:     * Nasal polyp [J33 9]     * Chronic pansinusitis [J32 4]    Procedure(s) (LRB):  1  Bilateral maxillary sinusotomies with tissue removal  2  Bilateral complete ethmoidectomies  3  Bilateral sphenoidotomies with tissue removal  4  Bilateral frontal sinusotomies  5  Image guidance surgery    Specimen(s):  ID Type Source Tests Collected by Time Destination   1 : Right Nasal Polyp Tissue Nasal/Sinus Polyps TISSUE EXAM Cathy Hurley MD 2017 8523    2 : Left Middle Turbinate Tissue Nasal Turbinates TISSUE EXAM Cathy Hurley MD 2017 1805    A : Right Maxillary Sinus Culture Body Fluid Maxillary Sinus ANAEROBIC CULTURE AND GRAM STAIN, FUNGAL CULTURE Cathy Hurley MD 2017 1755        Estimated Blood Loss:   250 mL    Drains:       Anesthesia Type:   General    Operative Indications:  Nasal polyp [J33 9]  Chronic pansinusitis [J32 4]      Operative Findings:  Extensive sinonasal polyposis    Complications:   None    Procedure and Technique:  The patient was identified and brought into the operating room and placed on the operating table in a supine position  General anesthesia was induced  4% soaked cocaine pledgets were inserted into the nose bilaterally  The patient was prepped and draped in the usual fashion  The image guidance headset was applied and calibrated and seen to be accurate  A time-out was performed and the operation was begun  Extensive nasal polyposis was seen  This was injected bilaterally with lidocaine and epinephrine  A microdebrider was used to remove nasal polyps bilaterally   The polyps were seen to degenerate the middle turbinates in a portion of each middle turbinate was removed as well  The region of the sphenopalatine artery was injected bilaterally with lidocaine and epinephrine for hemostasis  Working on the right side 1st polyps were removed from the ostium of the maxillary sinus which was entered and enlarged removing polypoid tissue from within the sinus with a 30 degree endoscope and angled image guidance micro debrider  Next a complete ethmoidectomy was performed on this side  Dissection was carried through the basal lamella to the face of the sphenoid sinus with the microdebrider removing extensive polypoid tissue within the sinus  Polypoid skull base septations were also removed with a J curette  Warm saline irrigation along with thrombin in epinephrine-soaked pledgets were used extensively throughout the procedure for additional hemostasis  The sphenoid ostium was identified with image guidance and entered  Polypoid tissue from within the sinus was removed along with thick eosinophilic mucin  A culture was taken from the right maxillary sinus  There was mucopus within the sinus  Image guidance was used to reference the skull base and lamina papyracea on the side  These structures were not transgressed  A similar procedure was performed on the left side with similar findings  Next using a 30 degree endoscope a frontal sinusotomy was performed on the right side  An angled microdebrider was used to remove polypoid tissue from the frontal recess and a Hosemann punch was used to remove polypoid tissue from the frontal sinus ostium and widen it  A similar procedure was performed on the left side as well  Additional bony septations were removed bilaterally to widen the frontal recess with angled frontal sinus instruments  The J curette was again used along the skull base in this region removing any additional polypoid tissue and bony septations to complete the frontal sinusotomy    Next propel stents were inserted bilaterally into the frontal sinus ostia   The middle turbinates were stented medially with bilateral ethmoid propel stents  Gel-Foam was inserted into the lumen of the propel stents bilaterally along with Preet for additional hemostasis  Bleeding along the margin of the inferior turbinate was cauterized with suction cautery  Region of the nasal septal artery bilaterally was also cauterized at its bleeding edges with suction cautery  Finally the patient was gently awakened from general anesthesia and transported to the PACU in stable condition  He tolerated this well       I was present for the entire procedure    Patient Disposition:  PACU     SIGNATURE: Reyna Lucas MD  DATE: December 27, 2017  TIME: 7:26 PM

## 2017-12-28 NOTE — ANESTHESIA POSTPROCEDURE EVALUATION
Post-Op Assessment Note      CV Status:  Stable    Mental Status:  Alert and awake    Hydration Status:  Euvolemic    PONV Controlled:  Controlled    Airway Patency:  Patent    Post Op Vitals Reviewed: Yes          Staff: CRNA           /85 (12/27/17 1934)    Temp 99 8 °F (37 7 °C) (Simultaneous filing  User may not have seen previous data ) (12/27/17 1934)    Pulse 92 (Simultaneous filing  User may not have seen previous data ) (12/27/17 1934)   Resp 16 (Simultaneous filing  User may not have seen previous data ) (12/27/17 1934)    SpO2 100 % (Simultaneous filing   User may not have seen previous data ) (12/27/17 1934)

## 2017-12-29 LAB
BACTERIA SPEC ANAEROBE CULT: ABNORMAL
BACTERIA SPEC ANAEROBE CULT: ABNORMAL

## 2018-01-05 ENCOUNTER — HOSPITAL ENCOUNTER (EMERGENCY)
Facility: HOSPITAL | Age: 28
Discharge: HOME | End: 2018-01-05
Attending: EMERGENCY MEDICINE | Admitting: EMERGENCY MEDICINE
Payer: COMMERCIAL

## 2018-01-05 VITALS
RESPIRATION RATE: 18 BRPM | OXYGEN SATURATION: 98 % | DIASTOLIC BLOOD PRESSURE: 60 MMHG | SYSTOLIC BLOOD PRESSURE: 108 MMHG | WEIGHT: 207 LBS | HEART RATE: 72 BPM | TEMPERATURE: 97.6 F | BODY MASS INDEX: 28.07 KG/M2

## 2018-01-05 DIAGNOSIS — R04.0 EPISTAXIS: Primary | ICD-10-CM

## 2018-01-05 PROCEDURE — 99283 EMERGENCY DEPT VISIT LOW MDM: CPT

## 2018-01-05 NOTE — ED ATTENDING ATTESTATION
Andrew Aden DO, saw and evaluated the patient  I have discussed the patient with the resident/non-physician practitioner and agree with the resident's/non-physician practitioner's findings, Plan of Care, and MDM as documented in the resident's/non-physician practitioner's note, except where noted  All available labs and Radiology studies were reviewed  At this point I agree with the current assessment done in the Emergency Department  I have conducted an independent evaluation of this patient a history and physical is as follows:    27 yo male recent sinus surgery, polypectomy presents for acute R sided epistaxis for about 15 minutes starting just PTA  States he sneezed and had a large amount of blood come out  Currently asymptomatic  Denies CP/SOB, lightheadedness  Pt has appt with ENT today  Clot in B nares  Imp: B epistaxis - currently resolved  Clots present plan: likely refer to ENT office         Critical Care Time  CritCare Time    Procedures

## 2018-01-05 NOTE — ED RE-EVALUATION NOTE
Called ENT office, confirmed pt has appointment at 1pm today  Recommend f/u in office today as bleeding has stopped at this time  Risk re-bleeding if nose is manipulated/packed here in ED, ENT can provide more definitive tx

## 2018-01-05 NOTE — ED PROVIDER NOTES
History  Chief Complaint   Patient presents with    Nose Bleed     sinus surgery for polyps and blockage 9 days ago, scheduled for follow up today  nose bleed started around 8am from right nare  EMS reports significant amount of blood  50-year-old male with history of recent sinus surgery and polyp removal by Dr johnson on 12/27 who presents for evaluation of a nosebleed  shortly after waking up this morning he coughed and then had bleeding out of the right anterior nares  States the bleeding lasted for 10-15 minutes and then stopped after direct pressure on the nasal bridge  He denies any blood going down the posterior pharynx is not passed any clots  He feels as if all the blood came out of the right nares  Since arrival there has been no active bleeding and the patient is resting comfortably in bed  He denies any lightheadedness palpitations or any other symptoms  Patient states he has a scheduled appointment with his surgeon at 1:00 p m  today  Prior to Admission Medications   Prescriptions Last Dose Informant Patient Reported? Taking?    ALPRAZolam (XANAX) 0 25 mg tablet   Yes No   Sig: Take 0 25 mg by mouth 2 (two) times a day as needed for anxiety   albuterol (PROVENTIL HFA,VENTOLIN HFA) 90 mcg/act inhaler   No No   Sig: Inhale 2 puffs every 6 (six) hours as needed for wheezing   fluticasone-salmeterol (ADVAIR) 250-50 mcg/dose inhaler   Yes No   Sig: Inhale 1 puff daily   montelukast (SINGULAIR) 10 mg tablet   Yes No   Sig: Take 10 mg by mouth daily        Facility-Administered Medications: None       Past Medical History:   Diagnosis Date    Aspirin-sensitive asthma with nasal polyps 10/21/2017    Asthma     Chronic sinusitis        Past Surgical History:   Procedure Laterality Date    SC NASAL/SINUS ENDOSCOPY,REMV TOTL ETHMOID N/A 12/27/2017    Procedure: ENDOSCOPIC SINUS SURGERY  WITH IMAGE GUIDANCE;  Surgeon: Reyna Lucas MD;  Location: [facility];  Service: ENT    SKIN GRAFT      UNDESCENDED TESTICLE EXPLORATION      WISDOM TOOTH EXTRACTION         Family History   Problem Relation Age of Onset    No Known Problems Mother     No Known Problems Father      I have reviewed and agree with the history as documented  Social History   Substance Use Topics    Smoking status: Former Smoker    Smokeless tobacco: Current User     Types: Snuff    Alcohol use Yes      Comment: social/weekends         Review of Systems   Constitutional: Negative for chills, fatigue and fever  HENT: Positive for nosebleeds  Negative for congestion and sore throat  Eyes: Negative for visual disturbance  Respiratory: Negative for cough, chest tightness, shortness of breath and wheezing  Cardiovascular: Negative for chest pain, palpitations and leg swelling  Gastrointestinal: Negative for abdominal pain, blood in stool, diarrhea, nausea and vomiting  Genitourinary: Negative for difficulty urinating, dysuria and hematuria  Musculoskeletal: Negative for gait problem  Skin: Negative for rash  Neurological: Negative for dizziness, syncope, weakness, light-headedness, numbness and headaches  Hematological: Negative for adenopathy  Psychiatric/Behavioral: Negative for sleep disturbance  Physical Exam  ED Triage Vitals [01/05/18 0928]   Temperature Pulse Respirations Blood Pressure SpO2   97 6 °F (36 4 °C) 63 16 109/65 99 %      Temp Source Heart Rate Source Patient Position - Orthostatic VS BP Location FiO2 (%)   Oral Monitor -- Right arm --      Pain Score       No Pain           Orthostatic Vital Signs  Vitals:    01/05/18 0928 01/05/18 1025   BP: 109/65 108/60   Pulse: 63 72       Physical Exam   Constitutional: He is oriented to person, place, and time  He appears well-developed and well-nourished  No distress  HENT:   Head: Normocephalic and atraumatic  Nose: No septal deviation or nasal septal hematoma  Epistaxis is observed   Right sinus exhibits no maxillary sinus tenderness and no frontal sinus tenderness  Left sinus exhibits no maxillary sinus tenderness and no frontal sinus tenderness  Mouth/Throat: Uvula is midline, oropharynx is clear and moist and mucous membranes are normal  No trismus in the jaw  No uvula swelling  No oropharyngeal exudate, posterior oropharyngeal edema, posterior oropharyngeal erythema or tonsillar abscesses  No tonsillar exudate  Dry blood in the right and left nares with clots  No active bleeding  No blood in the posterior pharynx   Eyes: Conjunctivae and EOM are normal  Pupils are equal, round, and reactive to light  Neck: Normal range of motion  Neck supple  No JVD present  Cardiovascular: Normal rate and regular rhythm  No murmur heard  Pulmonary/Chest: Effort normal and breath sounds normal  He has no wheezes  He has no rales  Abdominal: Soft  He exhibits no distension  There is no tenderness  There is no guarding  Lymphadenopathy:     He has no cervical adenopathy  Neurological: He is alert and oriented to person, place, and time  Skin: Skin is warm and dry  Capillary refill takes less than 2 seconds  No rash noted  He is not diaphoretic  Psychiatric: He has a normal mood and affect  Nursing note and vitals reviewed  ED Medications  Medications - No data to display    Diagnostic Studies  Results Reviewed     None                 No orders to display         Procedures  Procedures      Phone Consults  ED Phone Contact    ED Course  ED Course as of Jan 05 1613   En Camejo Jan 05, 2018   9818 Chart review:  Patient had a nasal polyp removal as well as sinus surgery on 12/27 0957 No active bleeding since arrival    1002 Patient has confirmed appointment at 1:00 p m  today  No active bleeding  Discussed plan to follow up with scheduled appointment for further management of postoperative complications  Discussed return precautions including return of bleeding or other concerning symptoms    Patient agreeable MDM  CritCare Time    Disposition  Final diagnoses:   Epistaxis     Time reflects when diagnosis was documented in both MDM as applicable and the Disposition within this note     Time User Action Codes Description Comment    1/5/2018 10:07 AM Mariel Livingston Add [R04 0] Epistaxis       ED Disposition     ED Disposition Condition Comment    Discharge  Viktoria Lint discharge to home/self care  Condition at discharge: Good        Follow-up Information     Follow up With Specialties Details Why [address] [facility] Emergency Medicine Go to If symptoms worsen [address]  [phone]  ED, [address], [address] Abel CESAR MD Otolaryngology Go to For re-check Selwyn Ballesteros   [phone]           Discharge Medication List as of 1/5/2018 10:10 AM      CONTINUE these medications which have NOT CHANGED    Details   albuterol (PROVENTIL HFA,VENTOLIN HFA) 90 mcg/act inhaler Inhale 2 puffs every 6 (six) hours as needed for wheezing, Starting Mon 12/11/2017, Print      ALPRAZolam (XANAX) 0 25 mg tablet Take 0 25 mg by mouth 2 (two) times a day as needed for anxiety, Historical Med      fluticasone-salmeterol (ADVAIR) 250-50 mcg/dose inhaler Inhale 1 puff daily, Historical Med      montelukast (SINGULAIR) 10 mg tablet Take 10 mg by mouth daily  , Historical Med           No discharge procedures on file  ED Provider  Attending physically available and evaluated Viktoria Orellana  ANA managed the patient along with the ED Attending      Electronically Signed by         Quiana Xiong DO  Resident  01/05/18 9722

## 2018-01-05 NOTE — DISCHARGE INSTRUCTIONS
Follow-up with the scheduled appointment at 1:00 p m  Today  Return immediately with any return of bleeding any sensation of blood going down the back of the throat passage of any clots any lightheadedness or any other concerning symptoms  Epistaxis   WHAT YOU SHOULD KNOW:   Epistaxis is a nosebleed  A nosebleed occurs when the blood vessels near the surface of the nasal cavity are injured or damaged  AFTER YOU LEAVE:   Medicines:   · Nasal sprays:  Vasoconstrictor nasal spray is a medicine that helps make nasal blood vessels narrower  This limits the blood flow and stops the bleeding  This medicine also decreases the swelling inside your nose and helps you breathe easier  You may also be directed to use saline or other nasal sprays to add moisture to your nose  · Antibiotics: This medicine is given to help treat or prevent an infection caused by bacteria  · Take your medicine as directed  Call your healthcare provider if you think your medicine is not helping or if you have side effects  Tell him if you are allergic to any medicine  Keep a list of the medicines, vitamins, and herbs you take  Include the amounts, and when and why you take them  Bring the list or the pill bottles to follow-up visits  Carry your medicine list with you in case of an emergency  Follow up with your primary healthcare provider or otolaryngologist within 2 to 3 days or as directed: Any packing in your nose should be removed within 2 to 3 days  Write down your questions so you remember to ask them during your visits  First aid:   · Sit up and lean forward: This will help prevent you from swallowing blood  Spit blood and saliva into a bowl  · Apply pressure to your nose:  Use 2 fingers to pinch your nose shut for 10 minutes  This will help stop the bleeding  Breathe through your mouth  · Apply ice:  Use a cold pack or put crushed ice in a bag, cover with a towel, and place on the bridge of your nose  · Nasal packing:  Pack your nose with a cotton ball, tissue, tampon, or gauze bandage to stop the bleeding  Prevent epistaxis:  · Avoid nose picking and blowing your nose too hard: You can irritate or damage your nose if you pick it  Blowing your nose too hard may cause the bleeding to start again  · Avoid irritants:  Substances that can irritate your nose should be avoided  These include tobacco smoke and chemical sprays such as  that contain ammonia  · Use a cool mist humidifier in your home: This will add the moisture to the air and help keep your nose moist      · Put a small amount of petroleum jelly inside your nostrils: You may apply a small amount of petroleum jelly if you do not have a nasal packing  This will help keep your nose from drying out or getting irritated  Do not put anything else inside your nose unless your primary healthcare provider tells you to do so  Contact your primary healthcare provider or otolaryngologist if:   · You have a fever and are vomiting  · You have pain in and around your nose that is getting worse even after you take pain medicines  · Your nasal pack is loose  · You have questions or concerns about your condition or care  Seek care immediately if:   · Your nasal packing is soaked with blood  · Your nose is still bleeding after 20 minutes, even after you pinch it  · You have a foul-smelling discharge coming out of your nose  · You feel so weak and dizzy that you have trouble standing up  · You have trouble breathing or talking  © 2014 [address] is for End User's use only and may not be sold, redistributed or otherwise used for commercial purposes  All illustrations and images included in CareNotes® are the copyrighted property of [employer] , [employer]  or Dorian Kirby  The above information is an  only  It is not intended as medical advice for individual conditions or treatments  Talk to your doctor, nurse or pharmacist before following any medical regimen to see if it is safe and effective for you

## 2018-01-12 VITALS
BODY MASS INDEX: 29.59 KG/M2 | HEART RATE: 65 BPM | TEMPERATURE: 98.3 F | DIASTOLIC BLOOD PRESSURE: 78 MMHG | SYSTOLIC BLOOD PRESSURE: 120 MMHG | WEIGHT: 218.5 LBS | HEIGHT: 72 IN | OXYGEN SATURATION: 99 % | RESPIRATION RATE: 20 BRPM

## 2018-01-14 NOTE — MISCELLANEOUS
Provider Comments  Provider Comments:   Patient was a No Show to his appt with Dr An Lucas on [address]  June 27th, 2018   No show Letter was sent out today///Heather//      Signatures   Electronically signed by : Evelyn Munguia, ; Jun 28 2017 10:27AM EST                       (Author)

## 2018-01-29 LAB — FUNGUS SPEC CULT: NORMAL

## 2018-03-05 ENCOUNTER — EVALUATION (OUTPATIENT)
Dept: PHYSICAL THERAPY | Facility: REHABILITATION | Age: 28
End: 2018-03-05
Payer: COMMERCIAL

## 2018-03-05 DIAGNOSIS — G89.29 CHRONIC RIGHT-SIDED LOW BACK PAIN, WITH SCIATICA PRESENCE UNSPECIFIED: Primary | ICD-10-CM

## 2018-03-05 DIAGNOSIS — M54.5 CHRONIC RIGHT-SIDED LOW BACK PAIN, WITH SCIATICA PRESENCE UNSPECIFIED: Primary | ICD-10-CM

## 2018-03-05 PROCEDURE — 97140 MANUAL THERAPY 1/> REGIONS: CPT | Performed by: PHYSICAL THERAPIST

## 2018-03-05 PROCEDURE — G8979 MOBILITY GOAL STATUS: HCPCS | Performed by: PHYSICAL THERAPIST

## 2018-03-05 PROCEDURE — G8978 MOBILITY CURRENT STATUS: HCPCS | Performed by: PHYSICAL THERAPIST

## 2018-03-05 PROCEDURE — 97162 PT EVAL MOD COMPLEX 30 MIN: CPT | Performed by: PHYSICAL THERAPIST

## 2018-03-05 PROCEDURE — 97112 NEUROMUSCULAR REEDUCATION: CPT | Performed by: PHYSICAL THERAPIST
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
## 2018-03-08 ENCOUNTER — OFFICE VISIT (OUTPATIENT)
Dept: PHYSICAL THERAPY | Facility: REHABILITATION | Age: 28
End: 2018-03-08
Payer: COMMERCIAL

## 2018-03-08 DIAGNOSIS — G89.29 CHRONIC RIGHT-SIDED LOW BACK PAIN, WITH SCIATICA PRESENCE UNSPECIFIED: Primary | ICD-10-CM

## 2018-03-08 DIAGNOSIS — M54.5 CHRONIC RIGHT-SIDED LOW BACK PAIN, WITH SCIATICA PRESENCE UNSPECIFIED: Primary | ICD-10-CM

## 2018-03-08 PROCEDURE — 97112 NEUROMUSCULAR REEDUCATION: CPT | Performed by: PHYSICAL THERAPIST

## 2018-03-08 PROCEDURE — 97110 THERAPEUTIC EXERCISES: CPT | Performed by: PHYSICAL THERAPIST

## 2018-03-08 PROCEDURE — 97140 MANUAL THERAPY 1/> REGIONS: CPT | Performed by: PHYSICAL THERAPIST

## 2018-03-21 ENCOUNTER — APPOINTMENT (OUTPATIENT)
Dept: PHYSICAL THERAPY | Facility: REHABILITATION | Age: 28
End: 2018-03-21
Payer: COMMERCIAL

## 2018-04-09 ENCOUNTER — OFFICE VISIT (OUTPATIENT)
Dept: URGENT CARE | Age: 28
End: 2018-04-09
Payer: COMMERCIAL

## 2018-04-09 VITALS
OXYGEN SATURATION: 97 % | HEIGHT: 72 IN | RESPIRATION RATE: 16 BRPM | DIASTOLIC BLOOD PRESSURE: 71 MMHG | SYSTOLIC BLOOD PRESSURE: 136 MMHG | TEMPERATURE: 98.7 F | WEIGHT: 206 LBS | HEART RATE: 103 BPM | BODY MASS INDEX: 27.9 KG/M2

## 2018-04-09 DIAGNOSIS — H66.91 ACUTE RIGHT OTITIS MEDIA: Primary | ICD-10-CM

## 2018-04-09 PROCEDURE — S9088 SERVICES PROVIDED IN URGENT: HCPCS | Performed by: FAMILY MEDICINE

## 2018-04-09 PROCEDURE — 99213 OFFICE O/P EST LOW 20 MIN: CPT | Performed by: FAMILY MEDICINE

## 2018-04-09 RX ORDER — FEXOFENADINE HCL AND PSEUDOEPHEDRINE HCI 180; 240 MG/1; MG/1
1 TABLET, EXTENDED RELEASE ORAL
COMMUNITY
End: 2018-05-10 | Stop reason: CLARIF

## 2018-04-09 RX ORDER — AMOXICILLIN 500 MG/1
500 CAPSULE ORAL EVERY 8 HOURS SCHEDULED
Qty: 30 CAPSULE | Refills: 0 | Status: SHIPPED | OUTPATIENT
Start: 2018-04-09 | End: 2018-04-19

## 2018-04-09 NOTE — PROGRESS NOTES
[facility]        NAME: Kelli Samaniego is a 28 y o  male  : 1990    MRN: [id number]  DATE: 2018  TIME: 12:29 PM    Assessment and Plan   Acute right otitis media [H66 91]  1  Acute right otitis media  amoxicillin (AMOXIL) 500 mg capsule         Patient Instructions     Patient Instructions   Start antibiotic  Give probiotic  Tylenol as needed for pain or fever  Rest and drink extra fluids  Start Flonase and antihistamine for sinus congestion  Follow up with PCP if no improvement  Go to ER with worsening symptoms  Chief Complaint     Chief Complaint   Patient presents with    Cold Like Symptoms    Earache    Nasal Congestion         History of Present Illness   Kelli Samaniego presents to the clinic c/o    This is a 28year old female here today with right ear pain, sinus congestion and pressure  He states symptoms started 4 days ago  Ear pain become worse yesterday  He was having popping the ear  He has been using dayquil and tylenol with no relief  Review of Systems   Review of Systems   Constitutional: Positive for activity change and fatigue  Negative for chills and fever  HENT: Positive for congestion, sinus pain and sinus pressure  Negative for sore throat  Respiratory: Negative for cough, chest tightness, shortness of breath and wheezing  Cardiovascular: Negative  Neurological: Negative  Psychiatric/Behavioral: Negative            Current Medications     Long-Term Prescriptions   Medication Sig Dispense Refill    mepolizumab (NUCALA) 100 mg Inject 100 mg under the skin once      ALPRAZolam (XANAX) 0 25 mg tablet Take 0 25 mg by mouth 2 (two) times a day as needed for anxiety      montelukast (SINGULAIR) 10 mg tablet Take 10 mg by mouth daily           Current Allergies     Allergies as of 2018 - Reviewed 2018   Allergen Reaction Noted    Aspirin Anaphylaxis 10/21/2017    Motrin [ibuprofen] Anaphylaxis 2017    Cat hair extract Itching     Fexofenadine-pseudoephed er Allergic Rhinitis             The following portions of the patient's history were reviewed and updated as appropriate: allergies, current medications, past family history, past medical history, past social history, past surgical history and problem list     Objective   /71   Pulse 103   Temp 98 7 °F (37 1 °C)   Resp 16   Ht 6' (1 829 m)   Wt 93 4 kg (206 lb)   SpO2 97%   BMI 27 94 kg/m²        Physical Exam     Physical Exam   Constitutional: He is oriented to person, place, and time  He appears well-developed and well-nourished  HENT:   Right TM: moderate amount cerumen removed  After removal TM erythemic and bulging  Cardiovascular: Normal rate and normal heart sounds  Pulmonary/Chest: Effort normal and breath sounds normal    Neurological: He is alert and oriented to person, place, and time  Skin: Skin is warm  Psychiatric: He has a normal mood and affect  His behavior is normal    Nursing note and vitals reviewed

## 2018-04-09 NOTE — PATIENT INSTRUCTIONS
Start antibiotic  Give probiotic  Tylenol as needed for pain or fever  Rest and drink extra fluids  Start Flonase and antihistamine for sinus congestion  Follow up with PCP if no improvement  Go to ER with worsening symptoms  Otitis Media   WHAT YOU NEED TO KNOW:   Otitis media is an ear infection  DISCHARGE INSTRUCTIONS:   Medicines:  · Ibuprofen or acetaminophen  helps decrease your pain and fever  They are available without a doctor's order  Ask your healthcare provider which medicine is right for you  Ask how much to take and how often to take it  These medicines can cause stomach bleeding if not taken correctly  Ibuprofen can cause kidney damage  Do not take ibuprofen if you have kidney disease, an ulcer, or allergies to aspirin  Acetaminophen can cause liver damage  Do not drink alcohol if you take acetaminophen  · Ear drops  help treat your ear pain  · Antibiotics  help treat a bacterial infection that caused your ear infection  · Take your medicine as directed  Contact your healthcare provider if you think your medicine is not helping or if you have side effects  Tell him or her if you are allergic to any medicine  Keep a list of the medicines, vitamins, and herbs you take  Include the amounts, and when and why you take them  Bring the list or the pill bottles to follow-up visits  Carry your medicine list with you in case of an emergency  Heat or ice:   · Heat  may be used to decrease your pain  Place a warm, moist washcloth on your ear  Apply for 15 to 20 minutes, 3 to 4 times a day    · Ice  helps decrease swelling and pain  Use an ice pack or put crushed ice in a plastic bag  Cover the ice pack with a towel and place it on your ear for 15 to 20 minutes, 3 to 4 times a day for 2 days  Prevent otitis media:   · Wash your hands often  Use soap and water  Wash your hands after you use the bathroom, change a child's diapers, or sneeze  Wash your hands before you prepare or eat food  · Stay away from people who are ill  Some germs are easily and quickly spread through contact  Return to work or school: You may return to work or school when your fever is gone  Follow up with your healthcare provider as directed:  Write down your questions so you remember to ask them during your visits  Contact your healthcare provider if:   · Your ear pain gets worse or does not go away, even after treatment  · The outside of your ear is red or swollen  · You have vomiting or diarrhea  · You have fluid coming from your ear  · You have questions or concerns about your condition or care  Return to the emergency department if:   · You have a seizure  · You have a fever and a stiff neck  © 2017 [address] [employer]  Information is for End User's use only and may not be sold, redistributed or otherwise used for commercial purposes  All illustrations and images included in CareNotes® are the copyrighted property of [employer]  or Dorian Kirby  The above information is an  only  It is not intended as medical advice for individual conditions or treatments  Talk to your doctor, nurse or pharmacist before following any medical regimen to see if it is safe and effective for you

## 2018-04-09 NOTE — LETTER
April 9, 2018     Patient: Lillian Flores   YOB: 1990   Date of Visit: 4/9/2018       To Whom It May Concern: It is my medical opinion that Lillian Flores may return to work on 04/10/2018  If you have any questions or concerns, please don't hesitate to call           Sincerely,          [facility]    CC: No Recipients

## 2018-05-10 ENCOUNTER — OFFICE VISIT (OUTPATIENT)
Dept: PULMONOLOGY | Facility: CLINIC | Age: 28
End: 2018-05-10
Payer: COMMERCIAL

## 2018-05-10 VITALS
TEMPERATURE: 98.1 F | HEART RATE: 70 BPM | WEIGHT: 202.5 LBS | HEIGHT: 72 IN | SYSTOLIC BLOOD PRESSURE: 126 MMHG | DIASTOLIC BLOOD PRESSURE: 74 MMHG | OXYGEN SATURATION: 97 % | BODY MASS INDEX: 27.43 KG/M2

## 2018-05-10 DIAGNOSIS — Z88.6 ASPIRIN-SENSITIVE ASTHMA WITH NASAL POLYPS: Chronic | ICD-10-CM

## 2018-05-10 DIAGNOSIS — J45.909 ASPIRIN-SENSITIVE ASTHMA WITH NASAL POLYPS: Chronic | ICD-10-CM

## 2018-05-10 DIAGNOSIS — J33.9 ASPIRIN-SENSITIVE ASTHMA WITH NASAL POLYPS: Chronic | ICD-10-CM

## 2018-05-10 DIAGNOSIS — J45.50 SEVERE PERSISTENT EXTRINSIC ASTHMA WITHOUT COMPLICATION: Primary | ICD-10-CM

## 2018-05-10 PROCEDURE — 99213 OFFICE O/P EST LOW 20 MIN: CPT | Performed by: PHYSICIAN ASSISTANT

## 2018-05-10 RX ORDER — ALBUTEROL SULFATE 2.5 MG/3ML
2.5 SOLUTION RESPIRATORY (INHALATION) EVERY 6 HOURS PRN
Qty: 75 ML | Refills: 11 | Status: SHIPPED | OUTPATIENT
Start: 2018-05-10 | End: 2020-03-11

## 2018-05-10 RX ORDER — ALBUTEROL SULFATE 90 UG/1
2 AEROSOL, METERED RESPIRATORY (INHALATION) EVERY 6 HOURS PRN
Qty: 1 INHALER | Refills: 11 | Status: SHIPPED | OUTPATIENT
Start: 2018-05-10 | End: 2019-06-06 | Stop reason: SDUPTHER

## 2018-05-10 NOTE — PROGRESS NOTES
Assessment:    1  Severe persistent extrinsic asthma without complication  albuterol (PROVENTIL HFA,VENTOLIN HFA) 90 mcg/act inhaler    albuterol (2 5 mg/3 mL) 0 083 % nebulizer solution   2  Aspirin-sensitive asthma with nasal polyps           Chest x-ray PA and Lat 12/11/2017   Reviewed and showing no acute cardiopulmonary disease  Plan: It was a pleasure seeing  Adelaida Mccray in the office today for regular office visit  Patient stating his asthma has been well controlled since his last visit  Was seen by ENT and had sinus surgery done on December 27, 2017  States only using his albuterol inhaler approximately 5 times a week and continues to use his Advair b I d  He has continued his Singulair and Allegra for his allergy symptoms which also seemed to be controlled at this time  Adelaida Mccray has been on Nucala Injections for the past 4 months and states has not noticed a dramatic improvement  in his symptoms  Patient will remain on his current pulmonary medication regimen   And will follow up in 6 months  Patient aware to call us with any worsening symptoms or any concerns       Subjective:     Patient ID: Mike Porter is a 28 y o  male  Chief Complaint:  HPI       Adelaida Mccray here for   Regular follow-up visit  Last seen by Dr Amber Parker in November 2017  Patient states his asthma symptoms have been well controlled since  Last appointment  States occasionally notices some mild chest tightness cough and shortness of Breath usually associated with change in weather or seasonal/allergy symptoms  Patient had sinus surgery on December 27th and states is able to breathe much easier for through his nose  Patient was  started on Nucala  Injections approximately 4 months ago  Unsure he is getting any benefit from the injections but his   Asthma symptoms are not any worse  Patient currently using albuterol p r n  And Advair  States has only used his albuterol approximately 5 to 6 times a week    Also remains on Singulair and Allegra for his allergy symptoms  Patient has stopped his Claritin D because it was exacerbating his anxiety symptoms  The following portions of the patient's history were reviewed in this encounter and updated as appropriate:   Review of Systems   Constitutional: Negative for activity change, appetite change, chills, diaphoresis, fatigue and fever  HENT: Positive for sneezing  Negative for congestion, postnasal drip, rhinorrhea, sinus pain, sinus pressure and sore throat  Respiratory: Positive for cough, chest tightness (Occasional chest tightness, cough and shortness of Breath associated with his asthma ), shortness of breath and wheezing  Negative for apnea, choking and stridor  Cardiovascular: Negative  Gastrointestinal: Negative  Genitourinary: Negative  Musculoskeletal: Negative for myalgias, neck pain and neck stiffness  Skin: Negative for rash  Neurological: Negative for dizziness and headaches  Psychiatric/Behavioral: Negative  Objective:    Physical Exam   Constitutional: He is oriented to person, place, and time  He appears well-developed and well-nourished  No distress  HENT:   Head: Normocephalic and atraumatic  Nose: Nose normal    Mouth/Throat: Oropharynx is clear and moist    Eyes: Conjunctivae are normal  Pupils are equal, round, and reactive to light  No scleral icterus  Neck: Neck supple  Cardiovascular: Normal rate, regular rhythm, normal heart sounds and intact distal pulses  Exam reveals no gallop and no friction rub  No murmur heard  Pulmonary/Chest: Effort normal and breath sounds normal  No respiratory distress  He has no wheezes  He has no rales  He exhibits no tenderness  Abdominal: Soft  Bowel sounds are normal  There is no tenderness  Musculoskeletal: He exhibits no edema or tenderness  Lymphadenopathy:     He has no cervical adenopathy  Neurological: He is alert and oriented to person, place, and time     Skin: Skin is warm and dry  No rash noted  He is not diaphoretic  Psychiatric: He has a normal mood and affect  Nursing note and vitals reviewed  Lab Review:   No visits with results within 2 Month(s) from this visit     Latest known visit with results is:   Admission on 12/27/2017, Discharged on 12/27/2017   Component Date Value    WBC 12/27/2017 7 96     RBC 12/27/2017 4 87     Hemoglobin 12/27/2017 15 5     Hematocrit 12/27/2017 44 1     MCV 12/27/2017 91     MCH 12/27/2017 31 8     MCHC 12/27/2017 35 1     RDW 12/27/2017 12 9     MPV 12/27/2017 11 1     Platelets 76/83/9634 262     nRBC 12/27/2017 0     Neutrophils Relative 12/27/2017 56     Lymphocytes Relative 12/27/2017 29     Monocytes Relative 12/27/2017 9     Eosinophils Relative 12/27/2017 6     Basophils Relative 12/27/2017 0     Neutrophils Absolute 12/27/2017 4 33     Lymphocytes Absolute 12/27/2017 2 34     Monocytes Absolute 12/27/2017 0 73     Eosinophils Absolute 12/27/2017 0 51     Basophils Absolute 12/27/2017 0 03     Sodium 12/27/2017 141     Potassium 12/27/2017 4 0     Chloride 12/27/2017 107     CO2 12/27/2017 28     Anion Gap 12/27/2017 6     BUN 12/27/2017 14     Creatinine 12/27/2017 0 93     Glucose 12/27/2017 92     Glucose, Fasting 12/27/2017 92     Calcium 12/27/2017 9 2     eGFR 12/27/2017 111     Case Report 12/27/2017                      Value:Surgical Pathology Report                         Case: [id number]                                   Authorizing Provider:  Rima Rice MD      Collected:           12/27/2017 1739              Ordering Location:     [address] [place] [address]      Received:            12/28/2017 [address] [place] [address] Operating Room                                                      Pathologist:           Betsy Bates MD                                                        Specimens:   A) - Nasal/Sinus Polyps, Right Nasal Polyp B) - Nasal Turbinates, Left Middle Turbinate                                               Final Diagnosis 12/27/2017                      Value: This result contains rich text formatting which cannot be displayed here   Additional Information 12/27/2017                      Value: This result contains rich text formatting which cannot be displayed here  Alla Sicks Gross Description 12/27/2017                      Value: This result contains rich text formatting which cannot be displayed here      Anaerobic Culture 12/27/2017 No anaerobes isolated     Anaerobic Culture 12/27/2017 3+ Growth of Moraxella catarrhalis*    Fungus (Mycology) Culture 12/27/2017 No Fungus Isolated at 4 Weeks

## 2018-06-11 ENCOUNTER — TELEPHONE (OUTPATIENT)
Dept: PSYCHIATRY | Facility: CLINIC | Age: 28
End: 2018-06-11
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
## 2018-06-14 ENCOUNTER — OFFICE VISIT (OUTPATIENT)
Dept: BEHAVIORAL/MENTAL HEALTH CLINIC | Facility: CLINIC | Age: 28
End: 2018-06-14
Payer: COMMERCIAL

## 2018-06-14 DIAGNOSIS — F41.9 ANXIETY: ICD-10-CM

## 2018-06-14 PROCEDURE — 90791 PSYCH DIAGNOSTIC EVALUATION: CPT | Performed by: SOCIAL WORKER
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
## 2018-06-21 ENCOUNTER — OFFICE VISIT (OUTPATIENT)
Dept: BEHAVIORAL/MENTAL HEALTH CLINIC | Facility: CLINIC | Age: 28
End: 2018-06-21
Payer: COMMERCIAL

## 2018-06-21 DIAGNOSIS — F41.9 ANXIETY: ICD-10-CM

## 2018-06-21 PROCEDURE — 90834 PSYTX W PT 45 MINUTES: CPT | Performed by: SOCIAL WORKER

## 2018-06-21 NOTE — PSYCH
Domo Sawyerdel  1990       Date of Initial Treatment Plan: 6/21/18Date of Current Treatment Plan: 06/21/18    Treatment Plan Number 1     Strengths/Personal Resources for Self Care: I care A LOT, I know I have potential, I am productive at task completion, I pay attention to getting things done  Diagnosis: Anxiety    Area of Needs: I don't know how to be forgiven for the people I have hurt or for the things I have done  Don't know how to handle certain situations without overthinking  I get anxious and stressed out about stuff-- between what I can and can't control  I am not sure what I want  Long Term Goal 1: AI want to be less anxious and to overthink less often  Target Date:10/21/18  Completion Date: na         Short Term Objectives for Goal 1: AI will learn ways to manage my negative thoughts and anxiety  and BI will consider meds to help with both  Long Term Goal 2: I want to attempt to figure out what I want  Target Date: 10/21/2018  Completion Date: N/A    Short Term Objectives for Goal 2: AI will engage in activities to explore my interests  I will use therapy to receive support  GOAL 1: Modality: Individual 2x per month   Completion Date 10/21/18 and The person(s) responsible for carrying out the plan is  Geovani Vasquez    GOAL 2: Modality:  Individual 2x per month   Completion Date 10/21/18 and The person(s) responsible for carrying out the plan is  [address]: Diagnosis and Treatment Plan explained to Paula Her relates understanding diagnosis and is agreeable to Treatment Plan         Client Comments : Please share your thoughts, feelings, need and/or experiences regarding your treatment plan: __________________________________________________________________    __________________________________________________________________    __________________________________________________________________    __________________________________________________________________    _______________________________________                Patient signature, Date Time: __________________________________________             Physician cosigner signature, Date, Time: ________________________________
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
## 2018-07-06 ENCOUNTER — TELEPHONE (OUTPATIENT)
Dept: PULMONOLOGY | Facility: CLINIC | Age: 28
End: 2018-07-06

## 2018-07-06 DIAGNOSIS — J45.50 SEVERE PERSISTENT ASTHMA WITHOUT COMPLICATION: Primary | ICD-10-CM

## 2018-07-06 RX ORDER — MONTELUKAST SODIUM 10 MG/1
10 TABLET ORAL DAILY
Qty: 30 TABLET | Refills: 3 | Status: SHIPPED | OUTPATIENT
Start: 2018-07-06 | End: 2018-11-15 | Stop reason: SDUPTHER

## 2018-07-06 NOTE — TELEPHONE ENCOUNTER
Patient called requesting new Rx for  Advair 250-5 and Singulair 10mg sent to [facility] in [place]   Please advise

## 2018-07-06 NOTE — TELEPHONE ENCOUNTER
Maria E Iqbal, patient called the office requesting a refill on his Singulair and his Advair   Would you check that I filled out the request correctly  Thank you

## 2018-08-03 ENCOUNTER — TRANSCRIBE ORDERS (OUTPATIENT)
Dept: LAB | Facility: HOSPITAL | Age: 28
End: 2018-08-03

## 2018-08-03 DIAGNOSIS — Z00.8 HEALTH EXAMINATION IN POPULATION SURVEYS: Primary | ICD-10-CM

## 2018-08-07 ENCOUNTER — OFFICE VISIT (OUTPATIENT)
Dept: BEHAVIORAL/MENTAL HEALTH CLINIC | Facility: CLINIC | Age: 28
End: 2018-08-07
Payer: COMMERCIAL

## 2018-08-07 DIAGNOSIS — F41.9 ANXIETY: ICD-10-CM

## 2018-08-07 PROCEDURE — 90834 PSYTX W PT 45 MINUTES: CPT | Performed by: SOCIAL WORKER

## 2018-08-07 NOTE — PSYCH
Psychotherapy Provided: Individual Psychotherapy 50 minutes     Length of time in session: 50 minutes, follow up in 2 week    Goals addressed in session: Goal 1 and Goal 2     Pain:      none    0    Current suicide risk : Low     D: Met with Maragerald Payne for Individual Therapy session  Mara Payne spoke about his feelings re: the four therapy sessions he has participated in with his paramour, his job interview, and his struggles to adjust to his paramour's mother moving in with them  A:  Mara Payne was much more open today re: his irrational thinking,  how he contributed to relationship stress and having lost his license  P:  Upcoming sessions will be used to support him with goals develolped today  Behavioral Health Treatment Plan [facility]: Diagnosis and Treatment Plan explained to Susan Jean Baptiste relates understanding diagnosis and is agreeable to Treatment Plan   Yes

## 2018-08-08 ENCOUNTER — APPOINTMENT (OUTPATIENT)
Dept: LAB | Facility: HOSPITAL | Age: 28
End: 2018-08-08

## 2018-08-08 DIAGNOSIS — Z00.8 HEALTH EXAMINATION IN POPULATION SURVEYS: ICD-10-CM

## 2018-08-08 LAB
CHOLEST SERPL-MCNC: 179 MG/DL (ref 50–200)
EST. AVERAGE GLUCOSE BLD GHB EST-MCNC: 105 MG/DL
HBA1C MFR BLD: 5.3 % (ref 4.2–6.3)
HDLC SERPL-MCNC: 59 MG/DL (ref 40–60)
LDLC SERPL CALC-MCNC: 101 MG/DL (ref 0–100)
NONHDLC SERPL-MCNC: 120 MG/DL
TRIGL SERPL-MCNC: 96 MG/DL

## 2018-08-08 PROCEDURE — 80061 LIPID PANEL: CPT

## 2018-08-08 PROCEDURE — 36415 COLL VENOUS BLD VENIPUNCTURE: CPT

## 2018-08-08 PROCEDURE — 83036 HEMOGLOBIN GLYCOSYLATED A1C: CPT

## 2018-08-29 ENCOUNTER — OFFICE VISIT (OUTPATIENT)
Dept: BEHAVIORAL/MENTAL HEALTH CLINIC | Facility: CLINIC | Age: 28
End: 2018-08-29
Payer: COMMERCIAL

## 2018-08-29 DIAGNOSIS — F41.9 ANXIETY: ICD-10-CM

## 2018-08-29 PROCEDURE — 90834 PSYTX W PT 45 MINUTES: CPT | Performed by: SOCIAL WORKER

## 2018-08-30 NOTE — PSYCH
Psychotherapy Provided: Individual Psychotherapy 50 minutes     Length of time in session: 50 minutes, follow up in 2 week    Goals addressed in session: Goal 1 and Goal 2     Pain:      none    0    Current suicide risk : Low     D:Amrik spoke about his feelings re: a disagreement he had this morning with his paramour  His frustration with her reluctance to make changes as he has done was processed  A:  Ranulfo Hopper struggles to accept that he is only able to change himself while other have to make changes themselves  This results in an increase in his anxiety that likely presents as control  P:  Upcoming sessions will be used to support him with the above  Behavioral Health Treatment Plan [facility]: Diagnosis and Treatment Plan explained to Noel Cramer relates understanding diagnosis and is agreeable to Treatment Plan   Yes

## 2018-09-11 ENCOUNTER — TELEPHONE (OUTPATIENT)
Dept: PULMONOLOGY | Facility: CLINIC | Age: 28
End: 2018-09-11

## 2018-09-11 ENCOUNTER — TELEPHONE (OUTPATIENT)
Dept: PULMONOLOGY | Facility: HOSPITAL | Age: 28
End: 2018-09-11

## 2018-09-11 DIAGNOSIS — J45.51 SEVERE PERSISTENT ASTHMA WITH ACUTE EXACERBATION: Primary | ICD-10-CM

## 2018-09-11 DIAGNOSIS — J45.909 UNCOMPLICATED ASTHMA, UNSPECIFIED ASTHMA SEVERITY, UNSPECIFIED WHETHER PERSISTENT: Primary | ICD-10-CM

## 2018-09-11 RX ORDER — PREDNISONE 20 MG/1
40 TABLET ORAL DAILY
Qty: 5 TABLET | Refills: 1 | Status: SHIPPED | OUTPATIENT
Start: 2018-09-11 | End: 2018-09-16

## 2018-09-11 RX ORDER — PREDNISONE 10 MG/1
20 TABLET ORAL 2 TIMES DAILY WITH MEALS
Qty: 10 TABLET | Refills: 0 | Status: SHIPPED | OUTPATIENT
Start: 2018-09-11 | End: 2018-10-18

## 2018-09-11 NOTE — TELEPHONE ENCOUNTER
Harshal Ruvalcaba calling saying for the past 3-4 days he has been having some chest tightness in his rib area  He is having a dry non productive cough  He has been shot of breath on exertion at work  He is also wheezing  He denies fever, chills and night sweats  He is not smoking  He is using his nebulizer once a day  He is using his inhalers more than usual   I advised him to use his nebulizer every 6 hours  He said this is all making his anxiety worse  Please advise

## 2018-09-11 NOTE — TELEPHONE ENCOUNTER
Since we cant seem to get him, I agree with nebulizer at least 2 to 3 times a day and I wrote for 5 days of prednisone to his pharmacy    If no improvement or if he is declining needs sick visit

## 2018-09-11 NOTE — TELEPHONE ENCOUNTER
Spoke with patient via phone  Over the past 3-5 days he has noticed increased chest tightness, bronchospasm and shortness of breath  Two days ago he started using nebulizer which he has not used in 5-6 months  He is also using his rescue inhaler 1-2 times daily  He has no cough or fever  He denies any recent travel or sick contacts  He also notes increased anxiety  He started Nucala 4-5 months ago and has been feeling great ever since  Unfortunately, due to busy work and home life he missed his August injection  He received his injection last Friday and is due again on October 2nd  He is otherwise using his Advair and Singulair as prescribed  He is wondering if the fact that he missed his August dose is causing the return of his symptoms  He also wanted to make us aware that when he eats extremely fast it feels as though food gets stuck and it hurts  After a seconds to a minute the pain subsides  I told him to try and take his time when eating and to monitor these symptoms  If they increase in frequency he will need to be seen by Gastroenterology  I have asked him to continue using his nebulizer 2-3 times daily as he can tolerate as this does cause increasing anxiety for him  I will place him on a short course of prednisone for 5 days  He was instructed to call the office if his symptoms do not improve or if they worsen  If his breathing acutely worsens he is to go directly to the emergency room for evaluation  All his questions were answered

## 2018-10-18 ENCOUNTER — OFFICE VISIT (OUTPATIENT)
Dept: URGENT CARE | Age: 28
End: 2018-10-18
Payer: COMMERCIAL

## 2018-10-18 VITALS
RESPIRATION RATE: 16 BRPM | SYSTOLIC BLOOD PRESSURE: 133 MMHG | BODY MASS INDEX: 28.71 KG/M2 | HEIGHT: 72 IN | WEIGHT: 212 LBS | TEMPERATURE: 98.4 F | OXYGEN SATURATION: 96 % | DIASTOLIC BLOOD PRESSURE: 74 MMHG | HEART RATE: 80 BPM

## 2018-10-18 DIAGNOSIS — J30.9 ALLERGIC RHINITIS, UNSPECIFIED SEASONALITY, UNSPECIFIED TRIGGER: Primary | ICD-10-CM

## 2018-10-18 PROCEDURE — 99213 OFFICE O/P EST LOW 20 MIN: CPT | Performed by: PHYSICIAN ASSISTANT

## 2018-10-18 PROCEDURE — S9088 SERVICES PROVIDED IN URGENT: HCPCS | Performed by: PHYSICIAN ASSISTANT

## 2018-10-18 RX ORDER — PREDNISONE 20 MG/1
20 TABLET ORAL DAILY
Qty: 7 TABLET | Refills: 0 | Status: SHIPPED | OUTPATIENT
Start: 2018-10-18 | End: 2018-10-25

## 2018-10-18 NOTE — LETTER
October 18, 2018     Patient: Karmen Holt   YOB: 1990   Date of Visit: 10/18/2018       To Whom It May Concern: It is my medical opinion that Karmen Holt may return to work on 10/19/2018  If you have any questions or concerns, please don't hesitate to call           Sincerely,        Loretta Cobb PA-C    CC: No Recipients

## 2018-10-18 NOTE — PROGRESS NOTES
[address]        NAME: Farzad Sadler is a 33 y o  male  : 1990    MRN: [id number]  DATE: 2018  TIME: 1:48 PM    Assessment and Plan   Allergic rhinitis, unspecified seasonality, unspecified trigger [J30 9]  1  Allergic rhinitis, unspecified seasonality, unspecified trigger  predniSONE 20 mg tablet     Patient states he has had prior serious asthma exacerbation that began with similar symptoms  He denies cough, SOB, chest tightness and states he only had one short episode of wheezing last night  He does not need a refill of albuterol  Lungs are clean  He was instructed to start taking prednisone if he develops SOB, chest tightness or worsening wheezing  Patient Instructions     Take medications as prescribed  Continue daily allergy medication  Saline nasal spray  Afrin spray if congestion gets worse or can't sleep (do not use for >3 days)  Fluids (warm water with honey and lemon)  Steam treatment  Change pillowcase everyday  Consider Nettipot use  Consider allergist follow up  Follow up with PCP in 3-5 days  Proceed to  ER if symptoms worsen  Chief Complaint     Chief Complaint   Patient presents with    Cold Like Symptoms     Pt c/o allergy symptoms since  that has morphed into a head cold (sneezing, runny nose, scratchy throat, b/l ear pressure, sinus pain/pressure, achiness)  Denies fever  History of Present Illness       Prior history of hospitalization for asthma exacerbation  URI    This is a new problem  The current episode started in the past 7 days  The problem has been gradually worsening  There has been no fever  Associated symptoms include congestion, ear pain, rhinorrhea, sinus pain, sneezing, a sore throat and wheezing  Pertinent negatives include no abdominal pain, chest pain, coughing, diarrhea, dysuria, headaches, joint pain, joint swelling, nausea, neck pain, plugged ear sensation, rash, swollen glands or vomiting   He has tried decongestant and antihistamine for the symptoms  The treatment provided mild relief  Review of Systems   Review of Systems   Constitutional: Negative for activity change, appetite change, chills and fever  HENT: Positive for congestion, ear pain, postnasal drip, rhinorrhea, sinus pain, sinus pressure, sneezing and sore throat  Negative for dental problem, ear discharge, facial swelling and trouble swallowing  Eyes: Positive for discharge (clear)  Negative for itching  Respiratory: Positive for wheezing  Negative for cough and shortness of breath  Cardiovascular: Negative for chest pain and palpitations  Gastrointestinal: Negative for abdominal pain, constipation, diarrhea, nausea and vomiting  Genitourinary: Negative for dysuria  Musculoskeletal: Negative for joint pain, myalgias and neck pain  Skin: Negative for rash  Neurological: Negative for dizziness, weakness, light-headedness and headaches           Current Medications       Current Outpatient Prescriptions:     albuterol (2 5 mg/3 mL) 0 083 % nebulizer solution, Take 3 mL (2 5 mg total) by nebulization every 6 (six) hours as needed for wheezing, Disp: 75 mL, Rfl: 11    albuterol (PROVENTIL HFA,VENTOLIN HFA) 90 mcg/act inhaler, Inhale 2 puffs every 6 (six) hours as needed for wheezing, Disp: 1 Inhaler, Rfl: 11    Fexofenadine HCl (ALLEGRA PO), Take by mouth, Disp: , Rfl:     fluticasone-salmeterol (ADVAIR) 250-50 mcg/dose inhaler, Inhale 1 puff daily, Disp: 1 Inhaler, Rfl: 3    mepolizumab (NUCALA) 100 mg, Inject 100 mg under the skin once, Disp: , Rfl:     montelukast (SINGULAIR) 10 mg tablet, Take 1 tablet (10 mg total) by mouth daily, Disp: 30 tablet, Rfl: 3    predniSONE 20 mg tablet, Take 1 tablet (20 mg total) by mouth daily for 7 days, Disp: 7 tablet, Rfl: 0    Current Allergies     Allergies as of 10/18/2018 - Reviewed 10/18/2018   Allergen Reaction Noted    Aspirin Anaphylaxis 10/21/2017    Motrin [ibuprofen] Anaphylaxis 12/26/2017    Other GI Intolerance     Cat hair extract Itching     Fexofenadine-pseudoephed er Allergic Rhinitis     Nsaids  10/18/2018            The following portions of the patient's history were reviewed and updated as appropriate: allergies, current medications, past family history, past medical history, past social history, past surgical history and problem list      Past Medical History:   Diagnosis Date    Aspirin-sensitive asthma with nasal polyps 10/21/2017    Asthma     Chronic sinusitis        Past Surgical History:   Procedure Laterality Date    LA NASAL/SINUS NDSC W/TOTAL ETHOIDECTOMY N/A 12/27/2017    Procedure: ENDOSCOPIC SINUS SURGERY  WITH IMAGE GUIDANCE;  Surgeon: Gregory Arellano MD;  [facility];  Service: ENT    SINUS ENDOSCOPY  12/27/2017    SKIN GRAFT      UNDESCENDED TESTICLE EXPLORATION      WISDOM TOOTH EXTRACTION         Family History   Problem Relation Age of Onset    No Known Problems Mother     No Known Problems Father          Medications have been verified  Objective   /74   Pulse 80   Temp 98 4 °F (36 9 °C)   Resp 16   Ht 6' (1 829 m)   Wt 96 2 kg (212 lb)   SpO2 96%   BMI 28 75 kg/m²        Physical Exam     Physical Exam   Constitutional: He is oriented to person, place, and time  He appears well-developed and well-nourished  No distress  HENT:   Head: Normocephalic and atraumatic  Right Ear: External ear normal    Left Ear: External ear normal    Mouth/Throat: Oropharynx is clear and moist  No oropharyngeal exudate  No sinus tenderness to palpation  Eyes: Conjunctivae are normal  Right eye exhibits no discharge  Left eye exhibits no discharge  Cardiovascular: Normal rate, regular rhythm and normal heart sounds  Exam reveals no gallop and no friction rub  No murmur heard  Pulmonary/Chest: Effort normal and breath sounds normal  No respiratory distress  He has no wheezes  He has no rales   He exhibits no tenderness  Lymphadenopathy:     He has no cervical adenopathy  Neurological: He is alert and oriented to person, place, and time  Skin: Skin is warm  No rash noted  Psychiatric: He has a normal mood and affect  His behavior is normal  Judgment and thought content normal    Vitals reviewed

## 2018-10-22 ENCOUNTER — OFFICE VISIT (OUTPATIENT)
Dept: BEHAVIORAL/MENTAL HEALTH CLINIC | Facility: CLINIC | Age: 28
End: 2018-10-22
Payer: COMMERCIAL

## 2018-10-22 DIAGNOSIS — F32.9 REACTIVE DEPRESSION: ICD-10-CM

## 2018-10-22 DIAGNOSIS — F41.9 ANXIETY: ICD-10-CM

## 2018-10-22 PROCEDURE — 90834 PSYTX W PT 45 MINUTES: CPT | Performed by: SOCIAL WORKER

## 2018-10-22 NOTE — PSYCH
Psychotherapy Provided: Individual Psychotherapy 50 minutes     Length of time in session: 50 minutes, follow up in 2 week    Goals addressed in session: Goal 1 and Goal 2     Pain:      none    0    Current suicide risk : Low     D:Amrik spoke about his feelings of frustration with how others handle situations differently than he would (ie  His fiance, his Supervisor)  He also shared how upset he was for missing his last appt  He has no PCP and admitted to stopping ADHD medication at age 24 as he "didn't think he needed it anymore," although his father is on both a stimulant and an SSRI as is his mother  (Wellbutrin)   A:  Micha Juarez continues to struggle to accept that he is only able to change himself while other have to make changes themselves  This results in an increase in his anxiety that likely presents as control  He also overthinks and becomes paralyzed  P:  Upcoming sessions will be used to support him with the above  Behavioral Health Treatment Plan [facility]: Diagnosis and Treatment Plan explained to Araceli Mendieta relates understanding diagnosis and is agreeable to Treatment Plan   Yes

## 2018-11-15 ENCOUNTER — OFFICE VISIT (OUTPATIENT)
Dept: PULMONOLOGY | Facility: CLINIC | Age: 28
End: 2018-11-15
Payer: COMMERCIAL

## 2018-11-15 VITALS
RESPIRATION RATE: 16 BRPM | SYSTOLIC BLOOD PRESSURE: 120 MMHG | OXYGEN SATURATION: 98 % | HEART RATE: 76 BPM | DIASTOLIC BLOOD PRESSURE: 70 MMHG | HEIGHT: 72 IN | BODY MASS INDEX: 28.44 KG/M2 | TEMPERATURE: 97 F | WEIGHT: 210 LBS

## 2018-11-15 DIAGNOSIS — J33.9 ASPIRIN-SENSITIVE ASTHMA WITH NASAL POLYPS: Primary | Chronic | ICD-10-CM

## 2018-11-15 DIAGNOSIS — J45.51: Chronic | ICD-10-CM

## 2018-11-15 DIAGNOSIS — Z88.6 ASPIRIN-SENSITIVE ASTHMA WITH NASAL POLYPS: Primary | Chronic | ICD-10-CM

## 2018-11-15 DIAGNOSIS — J45.50 SEVERE PERSISTENT ASTHMA WITHOUT COMPLICATION: ICD-10-CM

## 2018-11-15 DIAGNOSIS — J45.909 ASPIRIN-SENSITIVE ASTHMA WITH NASAL POLYPS: Primary | Chronic | ICD-10-CM

## 2018-11-15 PROCEDURE — 99214 OFFICE O/P EST MOD 30 MIN: CPT | Performed by: INTERNAL MEDICINE

## 2018-11-15 RX ORDER — OMEPRAZOLE 40 MG/1
CAPSULE, DELAYED RELEASE ORAL
COMMUNITY
Start: 2018-11-01 | End: 2018-11-20 | Stop reason: SDUPTHER

## 2018-11-15 RX ORDER — RANITIDINE 300 MG/1
TABLET ORAL
COMMUNITY
Start: 2018-11-01 | End: 2019-09-23

## 2018-11-15 RX ORDER — ALBUTEROL SULFATE 90 UG/1
1 AEROSOL, METERED RESPIRATORY (INHALATION) EVERY 6 HOURS PRN
COMMUNITY
Start: 2015-10-08 | End: 2018-11-15 | Stop reason: SDUPTHER

## 2018-11-15 RX ORDER — MONTELUKAST SODIUM 10 MG/1
10 TABLET ORAL DAILY
Qty: 90 TABLET | Refills: 3 | Status: SHIPPED | OUTPATIENT
Start: 2018-11-15 | End: 2020-01-20 | Stop reason: SDUPTHER

## 2018-11-15 NOTE — ASSESSMENT & PLAN NOTE
Parisa Archer is going to continue on his current medical regiment  I do believe the new callus helping him  We talked about wheeze to improve his allergies and asthma particularly with regards to his sleeping arrangements  Unfortunately continues to sleep with 3 dogs that he is allergic to  He is optimized with regards to changing filters an air purifier however I recommended that he not sleep with his dogs  He will continue on the Advair twice a day as well as Singulair at night as well as dual therapy for his acid reflux  His most recent spirometry and x-rays were normal  He is up-to-date on his flu shot and I will see him back in 6 months

## 2018-11-15 NOTE — PROGRESS NOTES
Assessment/Plan:    No problem-specific Assessment & Plan notes found for this encounter  Diagnoses and all orders for this visit:    Aspirin-sensitive asthma with nasal polyps    Severe persistent asthma without complication  -     montelukast (SINGULAIR) 10 mg tablet; Take 1 tablet (10 mg total) by mouth daily    Allergic atopic asthma, severe persistent, with acute exacerbation    Other orders  -     ranitidine (ZANTAC) 300 MG tablet;   -     omeprazole (PriLOSEC) 40 MG capsule;   -     Discontinue: albuterol (PROVENTIL HFA,VENTOLIN HFA) 90 mcg/act inhaler; Inhale 1 puff every 6 (six) hours as needed          Subjective:      Patient ID: Kevin Garcia is a 33 y o  male  Reshma Goodson is been doing much better with regards to his asthma  He continues to take his Advair twice a day as well as his Singulair the but since starting Nucela 6 months ago has only needed 1 burst of steroids  He still gets a sensation of nasal allergies with sneezing or runny nose and postnasal drip  He also has some intermittent hoarseness and ear fullness  His allergist believe this is secondary to laryngeal reflux and started him on omeprazole and Zantac  He has been on this for 2 weeks and so far noted minimal improvement  His sinuses are much better since his recent sinus surgery  He is up-to-date on his flu shot        The following portions of the patient's history were reviewed and updated as appropriate: allergies, current medications, past family history, past medical history, past social history, past surgical history and problem list     Review of Systems   Constitutional: Negative  HENT: Negative  Eyes: Negative  Respiratory: Negative for shortness of breath  Cardiovascular: Negative  Gastrointestinal: Negative  Endocrine: Negative  Genitourinary: Negative  Allergic/Immunologic: Negative  Neurological: Negative  Hematological: Negative  Psychiatric/Behavioral: Negative  Objective:      /70   Pulse 76   Temp (!) 97 °F (36 1 °C)   Resp 16   Ht 6' (1 829 m)   Wt 95 3 kg (210 lb)   SpO2 98%   BMI 28 48 kg/m²          Physical Exam   Constitutional: He is oriented to person, place, and time  He appears well-developed and well-nourished  HENT:   Head: Normocephalic  Eyes: Pupils are equal, round, and reactive to light  Neck: Neck supple  Cardiovascular: Normal rate  Pulmonary/Chest: Effort normal  No respiratory distress  He has no wheezes  He has no rales  Abdominal: Soft  Musculoskeletal: Normal range of motion  He exhibits no edema  Neurological: He is alert and oriented to person, place, and time  Skin: Skin is warm and dry

## 2018-11-20 ENCOUNTER — OFFICE VISIT (OUTPATIENT)
Dept: GASTROENTEROLOGY | Facility: CLINIC | Age: 28
End: 2018-11-20
Payer: COMMERCIAL

## 2018-11-20 VITALS
HEART RATE: 68 BPM | TEMPERATURE: 98.7 F | BODY MASS INDEX: 28.42 KG/M2 | DIASTOLIC BLOOD PRESSURE: 76 MMHG | HEIGHT: 72 IN | WEIGHT: 209.8 LBS | SYSTOLIC BLOOD PRESSURE: 124 MMHG

## 2018-11-20 DIAGNOSIS — K21.9 GASTROESOPHAGEAL REFLUX DISEASE WITHOUT ESOPHAGITIS: ICD-10-CM

## 2018-11-20 DIAGNOSIS — R13.10 DYSPHAGIA, UNSPECIFIED TYPE: Primary | ICD-10-CM

## 2018-11-20 PROCEDURE — 99204 OFFICE O/P NEW MOD 45 MIN: CPT | Performed by: INTERNAL MEDICINE

## 2018-11-20 RX ORDER — OMEPRAZOLE 40 MG/1
40 CAPSULE, DELAYED RELEASE ORAL 2 TIMES DAILY
Qty: 60 CAPSULE | Refills: 11 | Status: SHIPPED | OUTPATIENT
Start: 2018-11-20 | End: 2020-07-20 | Stop reason: SDUPTHER

## 2018-11-20 NOTE — ASSESSMENT & PLAN NOTE
Gastroesophageal reflux disease - Patient has the symptoms of LPR for a long time  Possible hiatal hernia or LES weakness  Should rule out Hooper's esophagus because of chronic symptoms  -will increase omeprazole to twice daily because of extra esophageal symptoms    -Patient was explained about the lifestyle and dietary modifications  Advised to avoid fatty foods, chocolates, caffeine, alcohol and any other triggering foods  Avoid eating for at least 3 hours before going to bed  -schedule for EGD    -Patient was explained about  the risks and benefits of the procedure  Risks including but not limited to bleeding, infection, perforation were explained in detail  Also explained about less than 100% sensitivity with the exam and other alternatives

## 2018-11-20 NOTE — LETTER
November 20, 2018     Referral [address] [facility] [id number]    Patient: Karmen Holt   YOB: 1990   Date of Visit: 11/20/2018       Dear Dr Delphine Yoon:    Thank you for referring Karmen Holt to me for evaluation  Below are my notes for this consultation  If you have questions, please do not hesitate to call me  I look forward to following your patient along with you  Sincerely,        Laci Ng MD        CC: No Recipients  Laci Ng MD  11/20/2018  5:53 PM  Sign at close encounter  Consultation - [address] [facility]  Karmen Holt 1990 male         Chief Complaint:  Reflux    HPI:  27-year-old male with history of asthma reports having sinus issues for couple of years  He is being followed by allergy specialist and was told about LPR  He was recently started on omeprazole 40 mg in the morning and ranitidine 300 mg at night  He is complaining about indigestion and acid reflux with scratchy throat  He tries to clear his throat frequently  He also complains about difficulty with swallowing at times  No abdominal pain, nausea or vomiting  Good appetite, no recent weight loss  Regular bowel movements and denies any blood or mucus in the stool  REVIEW OF SYSTEMS: Review of Systems   Constitutional: Negative for activity change, appetite change, chills, diaphoresis, fatigue, fever and unexpected weight change  HENT: Negative for ear discharge, ear pain, facial swelling, hearing loss, nosebleeds, sore throat, tinnitus and voice change  Eyes: Negative for pain, discharge, redness, itching and visual disturbance  Respiratory: Negative for apnea, cough, chest tightness, shortness of breath and wheezing  Cardiovascular: Negative for chest pain and palpitations  Gastrointestinal:        As noted in HPI   Endocrine: Negative for cold intolerance, heat intolerance and polyuria     Genitourinary: Negative for difficulty urinating, dysuria, flank pain, hematuria and urgency  Musculoskeletal: Negative for arthralgias, back pain, gait problem, joint swelling and myalgias  Skin: Negative for rash and wound  Neurological: Negative for dizziness, tremors, seizures, speech difficulty, light-headedness, numbness and headaches  Hematological: Negative for adenopathy  Does not bruise/bleed easily  Psychiatric/Behavioral: Negative for agitation, behavioral problems and confusion  The patient is not nervous/anxious  Past Medical History:   Diagnosis Date    Aspirin-sensitive asthma with nasal polyps 10/21/2017    Asthma     Chronic sinusitis       Past Surgical History:   Procedure Laterality Date    CT NASAL/SINUS NDSC W/TOTAL ETHOIDECTOMY N/A 12/27/2017    Procedure: ENDOSCOPIC SINUS SURGERY  WITH IMAGE GUIDANCE;  Surgeon: Cathy Hurley MD;  [facility];  Service: ENT    SINUS ENDOSCOPY  12/27/2017    SKIN GRAFT      UNDESCENDED TESTICLE EXPLORATION      WISDOM TOOTH EXTRACTION       Social History     Social History    Marital status: Single     Spouse name: N/A    Number of children: N/A    Years of education: N/A     Occupational History    Not on file  Social History Main Topics    Smoking status: Former Smoker     Packs/day: 1 00     Years: 8 00     Types: Cigarettes     Quit date: 2014    Smokeless tobacco: Current User     Types: Snuff    Alcohol use Yes      Comment: social    Drug use: No      Comment: occasional marijuana    Sexual activity: Not on file     Other Topics Concern    Not on file     Social History Narrative    No narrative on file     Family History   Problem Relation Age of Onset    No Known Problems Mother     No Known Problems Father     Colon cancer Paternal Grandfather      Aspirin; Motrin [ibuprofen]; Other; Cat hair extract;  Fexofenadine-pseudoephed er; and Nsaids  Current Outpatient Prescriptions   Medication Sig Dispense Refill    albuterol (2 5 mg/3 mL) 0 083 % nebulizer solution Take 3 mL (2 5 mg total) by nebulization every 6 (six) hours as needed for wheezing 75 mL 11    albuterol (PROVENTIL HFA,VENTOLIN HFA) 90 mcg/act inhaler Inhale 2 puffs every 6 (six) hours as needed for wheezing 1 Inhaler 11    Fexofenadine HCl (ALLEGRA PO) Take by mouth      fluticasone-salmeterol (ADVAIR) 250-50 mcg/dose inhaler Inhale 1 puff daily 1 Inhaler 3    mepolizumab (NUCALA) 100 mg Inject 100 mg under the skin once      montelukast (SINGULAIR) 10 mg tablet Take 1 tablet (10 mg total) by mouth daily 90 tablet 3    omeprazole (PriLOSEC) 40 MG capsule Take 1 capsule (40 mg total) by mouth 2 (two) times a day 60 capsule 11    ranitidine (ZANTAC) 300 MG tablet        No current facility-administered medications for this visit  Blood pressure 124/76, pulse 68, temperature 98 7 °F (37 1 °C), temperature source Tympanic, height 6' (1 829 m), weight 95 2 kg (209 lb 12 8 oz)  PHYSICAL EXAM: Physical Exam   Constitutional: He is oriented to person, place, and time  He appears well-developed  HENT:   Head: Normocephalic and atraumatic  Mouth/Throat: Oropharynx is clear and moist    Eyes: Pupils are equal, round, and reactive to light  Conjunctivae are normal  Right eye exhibits no discharge  Left eye exhibits no discharge  No scleral icterus  Neck: Neck supple  No JVD present  No tracheal deviation present  No thyromegaly present  Cardiovascular: Normal rate, regular rhythm, normal heart sounds and intact distal pulses  Exam reveals no gallop and no friction rub  No murmur heard  Pulmonary/Chest: Effort normal and breath sounds normal  No respiratory distress  He has no wheezes  He has no rales  He exhibits no tenderness  Abdominal: Soft  Bowel sounds are normal  He exhibits no distension and no mass  There is no tenderness  There is no rebound and no guarding  No hernia  Musculoskeletal: He exhibits no edema  Lymphadenopathy:     He has no cervical adenopathy  Neurological: He is alert and oriented to person, place, and time  Skin: Skin is warm and dry  No rash noted  No erythema  Psychiatric: He has a normal mood and affect  His behavior is normal  Thought content normal         Lab Results   Component Value Date    WBC 7 96 12/27/2017    HGB 15 5 12/27/2017    HCT 44 1 12/27/2017    MCV 91 12/27/2017     12/27/2017     Lab Results   Component Value Date    GLUCOSE 131 10/21/2017    CALCIUM 9 2 12/27/2017     01/02/2016    K 4 0 12/27/2017    CO2 28 12/27/2017     12/27/2017    BUN 14 12/27/2017    CREATININE 0 93 12/27/2017     No results found for: ALT, AST, GGT, ALKPHOS, BILITOT  No results found for: INR, PROTIME    No results found  ASSESSMENT & PLAN:    Gastroesophageal reflux disease without esophagitis  Gastroesophageal reflux disease - Patient has the symptoms of LPR for a long time  Possible hiatal hernia or LES weakness  Should rule out Hooper's esophagus because of chronic symptoms  -will increase omeprazole to twice daily because of extra esophageal symptoms    -Patient was explained about the lifestyle and dietary modifications  Advised to avoid fatty foods, chocolates, caffeine, alcohol and any other triggering foods  Avoid eating for at least 3 hours before going to bed  -schedule for EGD    -Patient was explained about  the risks and benefits of the procedure  Risks including but not limited to bleeding, infection, perforation were explained in detail  Also explained about less than 100% sensitivity with the exam and other alternatives  Dysphagia  Possible from peptic stricture or Schatzki's ring    Should also rule out eosinophilic esophagitis     -advised him to chew well before swallowing    -schedule for EGD

## 2018-11-20 NOTE — PROGRESS NOTES
Consultation - 126 [facility]  Kevin Garcia 1990 male         Chief Complaint:  Reflux    HPI:  54-year-old male with history of asthma reports having sinus issues for couple of years  He is being followed by allergy specialist and was told about LPR  He was recently started on omeprazole 40 mg in the morning and ranitidine 300 mg at night  He is complaining about indigestion and acid reflux with scratchy throat  He tries to clear his throat frequently  He also complains about difficulty with swallowing at times  No abdominal pain, nausea or vomiting  Good appetite, no recent weight loss  Regular bowel movements and denies any blood or mucus in the stool  REVIEW OF SYSTEMS: Review of Systems   Constitutional: Negative for activity change, appetite change, chills, diaphoresis, fatigue, fever and unexpected weight change  HENT: Negative for ear discharge, ear pain, facial swelling, hearing loss, nosebleeds, sore throat, tinnitus and voice change  Eyes: Negative for pain, discharge, redness, itching and visual disturbance  Respiratory: Negative for apnea, cough, chest tightness, shortness of breath and wheezing  Cardiovascular: Negative for chest pain and palpitations  Gastrointestinal:        As noted in HPI   Endocrine: Negative for cold intolerance, heat intolerance and polyuria  Genitourinary: Negative for difficulty urinating, dysuria, flank pain, hematuria and urgency  Musculoskeletal: Negative for arthralgias, back pain, gait problem, joint swelling and myalgias  Skin: Negative for rash and wound  Neurological: Negative for dizziness, tremors, seizures, speech difficulty, light-headedness, numbness and headaches  Hematological: Negative for adenopathy  Does not bruise/bleed easily  Psychiatric/Behavioral: Negative for agitation, behavioral problems and confusion  The patient is not nervous/anxious           Past Medical History:   Diagnosis Date    Aspirin-sensitive asthma with nasal polyps 10/21/2017    Asthma     Chronic sinusitis       Past Surgical History:   Procedure Laterality Date    IN NASAL/SINUS NDSC W/TOTAL ETHOIDECTOMY N/A 12/27/2017    Procedure: ENDOSCOPIC SINUS SURGERY  WITH IMAGE GUIDANCE;  Surgeon: Bettye Leigh MD;  [facility];  Service: ENT    SINUS ENDOSCOPY  12/27/2017    SKIN GRAFT      UNDESCENDED TESTICLE EXPLORATION      WISDOM TOOTH EXTRACTION       Social History     Social History    Marital status: Single     Spouse name: N/A    Number of children: N/A    Years of education: N/A     Occupational History    Not on file  Social History Main Topics    Smoking status: Former Smoker     Packs/day: 1 00     Years: 8 00     Types: Cigarettes     Quit date: 2014    Smokeless tobacco: Current User     Types: Snuff    Alcohol use Yes      Comment: social    Drug use: No      Comment: occasional marijuana    Sexual activity: Not on file     Other Topics Concern    Not on file     Social History Narrative    No narrative on file     Family History   Problem Relation Age of Onset    No Known Problems Mother     No Known Problems Father     Colon cancer Paternal Grandfather      Aspirin; Motrin [ibuprofen]; Other; Cat hair extract;  Fexofenadine-pseudoephed er; and Nsaids  Current Outpatient Prescriptions   Medication Sig Dispense Refill    albuterol (2 5 mg/3 mL) 0 083 % nebulizer solution Take 3 mL (2 5 mg total) by nebulization every 6 (six) hours as needed for wheezing 75 mL 11    albuterol (PROVENTIL HFA,VENTOLIN HFA) 90 mcg/act inhaler Inhale 2 puffs every 6 (six) hours as needed for wheezing 1 Inhaler 11    Fexofenadine HCl (ALLEGRA PO) Take by mouth      fluticasone-salmeterol (ADVAIR) 250-50 mcg/dose inhaler Inhale 1 puff daily 1 Inhaler 3    mepolizumab (NUCALA) 100 mg Inject 100 mg under the skin once      montelukast (SINGULAIR) 10 mg tablet Take 1 tablet (10 mg total) by mouth daily 90 tablet 3    omeprazole (PriLOSEC) 40 MG capsule Take 1 capsule (40 mg total) by mouth 2 (two) times a day 60 capsule 11    ranitidine (ZANTAC) 300 MG tablet        No current facility-administered medications for this visit  Blood pressure 124/76, pulse 68, temperature 98 7 °F (37 1 °C), temperature source Tympanic, height 6' (1 829 m), weight 95 2 kg (209 lb 12 8 oz)  PHYSICAL EXAM: Physical Exam   Constitutional: He is oriented to person, place, and time  He appears well-developed  HENT:   Head: Normocephalic and atraumatic  Mouth/Throat: Oropharynx is clear and moist    Eyes: Pupils are equal, round, and reactive to light  Conjunctivae are normal  Right eye exhibits no discharge  Left eye exhibits no discharge  No scleral icterus  Neck: Neck supple  No JVD present  No tracheal deviation present  No thyromegaly present  Cardiovascular: Normal rate, regular rhythm, normal heart sounds and intact distal pulses  Exam reveals no gallop and no friction rub  No murmur heard  Pulmonary/Chest: Effort normal and breath sounds normal  No respiratory distress  He has no wheezes  He has no rales  He exhibits no tenderness  Abdominal: Soft  Bowel sounds are normal  He exhibits no distension and no mass  There is no tenderness  There is no rebound and no guarding  No hernia  Musculoskeletal: He exhibits no edema  Lymphadenopathy:     He has no cervical adenopathy  Neurological: He is alert and oriented to person, place, and time  Skin: Skin is warm and dry  No rash noted  No erythema  Psychiatric: He has a normal mood and affect   His behavior is normal  Thought content normal         Lab Results   Component Value Date    WBC 7 96 12/27/2017    HGB 15 5 12/27/2017    HCT 44 1 12/27/2017    MCV 91 12/27/2017     12/27/2017     Lab Results   Component Value Date    GLUCOSE 131 10/21/2017    CALCIUM 9 2 12/27/2017     01/02/2016    K 4 0 12/27/2017    CO2 28 12/27/2017     12/27/2017    BUN 14 12/27/2017    CREATININE 0 93 12/27/2017     No results found for: ALT, AST, GGT, ALKPHOS, BILITOT  No results found for: INR, PROTIME    No results found  ASSESSMENT & PLAN:    Gastroesophageal reflux disease without esophagitis  Gastroesophageal reflux disease - Patient has the symptoms of LPR for a long time  Possible hiatal hernia or LES weakness  Should rule out Hooper's esophagus because of chronic symptoms  -will increase omeprazole to twice daily because of extra esophageal symptoms    -Patient was explained about the lifestyle and dietary modifications  Advised to avoid fatty foods, chocolates, caffeine, alcohol and any other triggering foods  Avoid eating for at least 3 hours before going to bed  -schedule for EGD    -Patient was explained about  the risks and benefits of the procedure  Risks including but not limited to bleeding, infection, perforation were explained in detail  Also explained about less than 100% sensitivity with the exam and other alternatives  Dysphagia  Possible from peptic stricture or Schatzki's ring    Should also rule out eosinophilic esophagitis     -advised him to chew well before swallowing    -schedule for EGD

## 2018-11-20 NOTE — ASSESSMENT & PLAN NOTE
Possible from peptic stricture or Schatzki's ring    Should also rule out eosinophilic esophagitis     -advised him to chew well before swallowing    -schedule for EGD

## 2018-11-30 ENCOUNTER — OFFICE VISIT (OUTPATIENT)
Dept: BEHAVIORAL/MENTAL HEALTH CLINIC | Facility: CLINIC | Age: 28
End: 2018-11-30
Payer: COMMERCIAL

## 2018-11-30 DIAGNOSIS — F41.9 ANXIETY: Primary | ICD-10-CM

## 2018-11-30 PROCEDURE — 90834 PSYTX W PT 45 MINUTES: CPT | Performed by: SOCIAL WORKER

## 2018-12-06 ENCOUNTER — ANESTHESIA EVENT (OUTPATIENT)
Dept: GASTROENTEROLOGY | Facility: HOSPITAL | Age: 28
End: 2018-12-06
Payer: COMMERCIAL

## 2018-12-08 ENCOUNTER — HOSPITAL ENCOUNTER (OUTPATIENT)
Facility: HOSPITAL | Age: 28
Setting detail: OUTPATIENT SURGERY
Discharge: HOME | End: 2018-12-08
Attending: INTERNAL MEDICINE | Admitting: INTERNAL MEDICINE
Payer: COMMERCIAL

## 2018-12-08 ENCOUNTER — ANESTHESIA (OUTPATIENT)
Dept: GASTROENTEROLOGY | Facility: HOSPITAL | Age: 28
End: 2018-12-08
Payer: COMMERCIAL

## 2018-12-08 VITALS
SYSTOLIC BLOOD PRESSURE: 102 MMHG | HEIGHT: 72 IN | DIASTOLIC BLOOD PRESSURE: 61 MMHG | BODY MASS INDEX: 28.73 KG/M2 | OXYGEN SATURATION: 98 % | WEIGHT: 212.13 LBS | HEART RATE: 63 BPM | RESPIRATION RATE: 16 BRPM | TEMPERATURE: 97.2 F

## 2018-12-08 DIAGNOSIS — K21.9 GASTROESOPHAGEAL REFLUX DISEASE WITHOUT ESOPHAGITIS: ICD-10-CM

## 2018-12-08 DIAGNOSIS — R13.10 DYSPHAGIA, UNSPECIFIED TYPE: ICD-10-CM

## 2018-12-08 PROCEDURE — 88342 IMHCHEM/IMCYTCHM 1ST ANTB: CPT | Performed by: PATHOLOGY

## 2018-12-08 PROCEDURE — 43239 EGD BIOPSY SINGLE/MULTIPLE: CPT | Performed by: INTERNAL MEDICINE

## 2018-12-08 PROCEDURE — 88305 TISSUE EXAM BY PATHOLOGIST: CPT | Performed by: PATHOLOGY

## 2018-12-08 RX ORDER — SODIUM CHLORIDE, SODIUM LACTATE, POTASSIUM CHLORIDE, CALCIUM CHLORIDE 600; 310; 30; 20 MG/100ML; MG/100ML; MG/100ML; MG/100ML
125 INJECTION, SOLUTION INTRAVENOUS CONTINUOUS
Status: DISCONTINUED | OUTPATIENT
Start: 2018-12-08 | End: 2018-12-08 | Stop reason: HOSPADM

## 2018-12-08 RX ORDER — PROPOFOL 10 MG/ML
INJECTION, EMULSION INTRAVENOUS CONTINUOUS PRN
Status: DISCONTINUED | OUTPATIENT
Start: 2018-12-08 | End: 2018-12-08 | Stop reason: SURG

## 2018-12-08 RX ORDER — PROPOFOL 10 MG/ML
INJECTION, EMULSION INTRAVENOUS AS NEEDED
Status: DISCONTINUED | OUTPATIENT
Start: 2018-12-08 | End: 2018-12-08 | Stop reason: SURG

## 2018-12-08 RX ADMIN — SODIUM CHLORIDE, SODIUM LACTATE, POTASSIUM CHLORIDE, AND CALCIUM CHLORIDE: .6; .31; .03; .02 INJECTION, SOLUTION INTRAVENOUS at 11:00

## 2018-12-08 RX ADMIN — PROPOFOL 200 MCG/KG/MIN: 10 INJECTION, EMULSION INTRAVENOUS at 11:03

## 2018-12-08 RX ADMIN — LIDOCAINE HYDROCHLORIDE 30 MG: 20 INJECTION, SOLUTION INTRAVENOUS at 11:03

## 2018-12-08 RX ADMIN — PROPOFOL 200 MG: 10 INJECTION, EMULSION INTRAVENOUS at 11:03

## 2018-12-08 NOTE — OP NOTE
ESOPHAGOGASTRODUODENOSCOPY    PROCEDURE: EGD/ Biopsy    INDICATIONS: GERD    POST-OP DIAGNOSIS: See the impression below    SEDATION: Monitored anesthesia care, check anesthesia records    PHYSICAL EXAM:    Vitals:    12/08/18 1029   BP: 127/59   Pulse: 81   Resp: 18   Temp: (!) 97 °F (36 1 °C)   SpO2: 96%    Body mass index is 28 77 kg/m²  General: NAD  Heart: S1 & S2 normal, RRR  Lungs: CTA, No rales or rhonchi  Abdomen: Soft, nontender, nondistended, good bowel sounds    CONSENT:  Informed consent was obtained for the procedure, including sedation after explaining the risks and benefits of the procedure  Risks including but not limited to bleeding, perforation, infection, aspiration were discussed in detail  Also explained about less than 100% sensitivity with the exam and other alternatives  PREPARATION:   EKG tracing, pulse oximetry, blood pressure were monitored throughout the procedure  Patient was identified by myself both verbally and by visual inspection of ID band  DESCRIPTION:   Patient was placed in the left lateral decubitus position and was sedated with the above medication  The gastroscope was introduced in to the oropharynx and the esophagus was intubated under direct visualization  Scope was passed down the esophagus up to 2nd part of the duodenum  A careful inspection was made as the gastroscope was withdrawn, including a retroflexed view of the stomach; findings and interventions are described below  FINDINGS:    #1  Esophagus and GEJ- Normal mucosa    #2  Stomach- Some erythema of gastric body status post biopsies to rule out H pylori    #3  Duodenum- Normal         IMPRESSIONS:      As above    RECOMMENDATIONS:     Continue omeprazole bid and Ranitidine    Check pathology    COMPLICATIONS:  None; patient tolerated the procedure well            DISPOSITION: PACU           CONDITION: Stable

## 2018-12-08 NOTE — H&P (VIEW-ONLY)
Consultation - 126 [facility]  Reji Franciscoin 1990 male         Chief Complaint:  Reflux    HPI:  19-year-old male with history of asthma reports having sinus issues for couple of years  He is being followed by allergy specialist and was told about LPR  He was recently started on omeprazole 40 mg in the morning and ranitidine 300 mg at night  He is complaining about indigestion and acid reflux with scratchy throat  He tries to clear his throat frequently  He also complains about difficulty with swallowing at times  No abdominal pain, nausea or vomiting  Good appetite, no recent weight loss  Regular bowel movements and denies any blood or mucus in the stool  REVIEW OF SYSTEMS: Review of Systems   Constitutional: Negative for activity change, appetite change, chills, diaphoresis, fatigue, fever and unexpected weight change  HENT: Negative for ear discharge, ear pain, facial swelling, hearing loss, nosebleeds, sore throat, tinnitus and voice change  Eyes: Negative for pain, discharge, redness, itching and visual disturbance  Respiratory: Negative for apnea, cough, chest tightness, shortness of breath and wheezing  Cardiovascular: Negative for chest pain and palpitations  Gastrointestinal:        As noted in HPI   Endocrine: Negative for cold intolerance, heat intolerance and polyuria  Genitourinary: Negative for difficulty urinating, dysuria, flank pain, hematuria and urgency  Musculoskeletal: Negative for arthralgias, back pain, gait problem, joint swelling and myalgias  Skin: Negative for rash and wound  Neurological: Negative for dizziness, tremors, seizures, speech difficulty, light-headedness, numbness and headaches  Hematological: Negative for adenopathy  Does not bruise/bleed easily  Psychiatric/Behavioral: Negative for agitation, behavioral problems and confusion  The patient is not nervous/anxious           Past Medical History:   Diagnosis Date    Aspirin-sensitive asthma with nasal polyps 10/21/2017    Asthma     Chronic sinusitis       Past Surgical History:   Procedure Laterality Date    [place] NASAL/SINUS NDSC W/TOTAL ETHOIDECTOMY N/A 12/27/2017    Procedure: ENDOSCOPIC SINUS SURGERY  WITH IMAGE GUIDANCE;  Surgeon: Ulices Romero MD;  [facility];  Service: ENT    SINUS ENDOSCOPY  12/27/2017    SKIN GRAFT      UNDESCENDED TESTICLE EXPLORATION      WISDOM TOOTH EXTRACTION       Social History     Social History    Marital status: Single     Spouse name: N/A    Number of children: N/A    Years of education: N/A     Occupational History    Not on file  Social History Main Topics    Smoking status: Former Smoker     Packs/day: 1 00     Years: 8 00     Types: Cigarettes     Quit date: 2014    Smokeless tobacco: Current User     Types: Snuff    Alcohol use Yes      Comment: social    Drug use: No      Comment: occasional marijuana    Sexual activity: Not on file     Other Topics Concern    Not on file     Social History Narrative    No narrative on file     Family History   Problem Relation Age of Onset    No Known Problems Mother     No Known Problems Father     Colon cancer Paternal Grandfather      Aspirin; Motrin [ibuprofen]; Other; Cat hair extract;  Fexofenadine-pseudoephed er; and Nsaids  Current Outpatient Prescriptions   Medication Sig Dispense Refill    albuterol (2 5 mg/3 mL) 0 083 % nebulizer solution Take 3 mL (2 5 mg total) by nebulization every 6 (six) hours as needed for wheezing 75 mL 11    albuterol (PROVENTIL HFA,VENTOLIN HFA) 90 mcg/act inhaler Inhale 2 puffs every 6 (six) hours as needed for wheezing 1 Inhaler 11    Fexofenadine HCl (ALLEGRA PO) Take by mouth      fluticasone-salmeterol (ADVAIR) 250-50 mcg/dose inhaler Inhale 1 puff daily 1 Inhaler 3    mepolizumab (NUCALA) 100 mg Inject 100 mg under the skin once      montelukast (SINGULAIR) 10 mg tablet Take 1 tablet (10 mg total) by mouth daily 90 tablet 3    omeprazole (PriLOSEC) 40 MG capsule Take 1 capsule (40 mg total) by mouth 2 (two) times a day 60 capsule 11    ranitidine (ZANTAC) 300 MG tablet        No current facility-administered medications for this visit  Blood pressure 124/76, pulse 68, temperature 98 7 °F (37 1 °C), temperature source Tympanic, height 6' (1 829 m), weight 95 2 kg (209 lb 12 8 oz)  PHYSICAL EXAM: Physical Exam   Constitutional: He is oriented to person, place, and time  He appears well-developed  HENT:   Head: Normocephalic and atraumatic  Mouth/Throat: Oropharynx is clear and moist    Eyes: Pupils are equal, round, and reactive to light  Conjunctivae are normal  Right eye exhibits no discharge  Left eye exhibits no discharge  No scleral icterus  Neck: Neck supple  No JVD present  No tracheal deviation present  No thyromegaly present  Cardiovascular: Normal rate, regular rhythm, normal heart sounds and intact distal pulses  Exam reveals no gallop and no friction rub  No murmur heard  Pulmonary/Chest: Effort normal and breath sounds normal  No respiratory distress  He has no wheezes  He has no rales  He exhibits no tenderness  Abdominal: Soft  Bowel sounds are normal  He exhibits no distension and no mass  There is no tenderness  There is no rebound and no guarding  No hernia  Musculoskeletal: He exhibits no edema  Lymphadenopathy:     He has no cervical adenopathy  Neurological: He is alert and oriented to person, place, and time  Skin: Skin is warm and dry  No rash noted  No erythema  Psychiatric: He has a normal mood and affect   His behavior is normal  Thought content normal         Lab Results   Component Value Date    WBC 7 96 12/27/2017    HGB 15 5 12/27/2017    HCT 44 1 12/27/2017    MCV 91 12/27/2017     12/27/2017     Lab Results   Component Value Date    GLUCOSE 131 10/21/2017    CALCIUM 9 2 12/27/2017     01/02/2016    K 4 0 12/27/2017    CO2 28 12/27/2017     12/27/2017    BUN 14 12/27/2017    CREATININE 0 93 12/27/2017     No results found for: ALT, AST, GGT, ALKPHOS, BILITOT  No results found for: INR, PROTIME    No results found  ASSESSMENT & PLAN:    Gastroesophageal reflux disease without esophagitis  Gastroesophageal reflux disease - Patient has the symptoms of LPR for a long time  Possible hiatal hernia or LES weakness  Should rule out Hooper's esophagus because of chronic symptoms  -will increase omeprazole to twice daily because of extra esophageal symptoms    -Patient was explained about the lifestyle and dietary modifications  Advised to avoid fatty foods, chocolates, caffeine, alcohol and any other triggering foods  Avoid eating for at least 3 hours before going to bed  -schedule for EGD    -Patient was explained about  the risks and benefits of the procedure  Risks including but not limited to bleeding, infection, perforation were explained in detail  Also explained about less than 100% sensitivity with the exam and other alternatives  Dysphagia  Possible from peptic stricture or Schatzki's ring    Should also rule out eosinophilic esophagitis     -advised him to chew well before swallowing    -schedule for EGD

## 2018-12-08 NOTE — DISCHARGE INSTR - AVS FIRST PAGE
ESOPHAGOGASTRODUODENOSCOPY    PROCEDURE: EGD/ Biopsy    INDICATIONS: GERD    POST-OP DIAGNOSIS: See the impression below    SEDATION: Monitored anesthesia care, check anesthesia records    PHYSICAL EXAM:    Vitals:    12/08/18 1029   BP: 127/59   Pulse: 81   Resp: 18   Temp: (!) 97 °F (36 1 °C)   SpO2: 96%    Body mass index is 28 77 kg/m²  General: NAD  Heart: S1 & S2 normal, RRR  Lungs: CTA, No rales or rhonchi  Abdomen: Soft, nontender, nondistended, good bowel sounds    CONSENT:  Informed consent was obtained for the procedure, including sedation after explaining the risks and benefits of the procedure  Risks including but not limited to bleeding, perforation, infection, aspiration were discussed in detail  Also explained about less than 100% sensitivity with the exam and other alternatives  PREPARATION:   EKG tracing, pulse oximetry, blood pressure were monitored throughout the procedure  Patient was identified by myself both verbally and by visual inspection of ID band  DESCRIPTION:   Patient was placed in the left lateral decubitus position and was sedated with the above medication  The gastroscope was introduced in to the oropharynx and the esophagus was intubated under direct visualization  Scope was passed down the esophagus up to 2nd part of the duodenum  A careful inspection was made as the gastroscope was withdrawn, including a retroflexed view of the stomach; findings and interventions are described below  FINDINGS:    #1  Esophagus and GEJ- Normal mucosa    #2  Stomach- Some erythema of gastric body    #3  Duodenum- Normal         IMPRESSIONS:      As above    RECOMMENDATIONS:     Continue omeprazole bid and Ranitidine    Check pathology    COMPLICATIONS:  None; patient tolerated the procedure well            DISPOSITION: PACU           CONDITION: Stable

## 2018-12-08 NOTE — ANESTHESIA PREPROCEDURE EVALUATION
Review of Systems/Medical History          Cardiovascular   Pulmonary  Smoker cigarette smoker  , Asthma ,        GI/Hepatic    GERD ,             Endo/Other     GYN       Hematology   Musculoskeletal       Neurology   Psychology           Physical Exam    Airway    Mallampati score: II         Dental   No notable dental hx     Cardiovascular      Pulmonary      Other Findings        Anesthesia Plan  ASA Score- 2     Anesthesia Type- IV sedation with anesthesia with ASA Monitors  Additional Monitors:   Airway Plan:     Comment: I, Dr Soren Blanco, the attending physician, have personally seen and evaluated the patient prior to anesthetic care  I have reviewed the pre-anesthetic record, and other medical records if appropriate to the anesthetic care  If a CRNA is involved in the case, I have reviewed the CRNA assessment, if present, and agree  The patient is in a suitable condition to proceed with my formulated anesthetic plan        Plan Factors-    Induction- intravenous  Postoperative Plan-     Informed Consent- Anesthetic plan and risks discussed with patient

## 2018-12-08 NOTE — ANESTHESIA POSTPROCEDURE EVALUATION
Post-Op Assessment Note      CV Status:  Stable    Mental Status:  Alert    Hydration Status:  Stable    PONV Controlled:  None    Airway Patency:  Patent    Post Op Vitals Reviewed: Yes          Staff: Anesthesiologist           BP      Temp     Pulse     Resp      SpO2

## 2018-12-13 ENCOUNTER — TELEPHONE (OUTPATIENT)
Dept: GASTROENTEROLOGY | Facility: AMBULARY SURGERY CENTER | Age: 28
End: 2018-12-13

## 2018-12-13 DIAGNOSIS — R10.13 DYSPEPSIA: Primary | ICD-10-CM

## 2019-01-17 ENCOUNTER — OFFICE VISIT (OUTPATIENT)
Dept: BEHAVIORAL/MENTAL HEALTH CLINIC | Facility: CLINIC | Age: 29
End: 2019-01-17
Payer: COMMERCIAL

## 2019-01-17 DIAGNOSIS — F41.9 ANXIETY: ICD-10-CM

## 2019-01-17 PROCEDURE — 90834 PSYTX W PT 45 MINUTES: CPT | Performed by: SOCIAL WORKER

## 2019-01-18 NOTE — PSYCH
Psychotherapy Provided: Individual Psychotherapy 50 minutes     Length of time in session: 50 minutes, follow up in 2 week    Goals addressed in session: Goal 1 and Goal 2     Pain:      none    0    Current suicide risk : Low     D:Amrik spoke at great length about his feelings of frustration with what it is like to have his mother-in-law (still) reside with he and his paramour  A:  Sreekanth Vasquez appears to lack insight as to ways that he could verbalize his thoughts and feelings differently  He enjoys therapy, but remains somewhat resistant to feedback at this time  P:  Upcoming sessions will be used to support him with the above  Behavioral Health Treatment Plan [facility]: Diagnosis and Treatment Plan explained to Mervat Sidhu relates understanding diagnosis and is agreeable to Treatment Plan   Yes

## 2019-01-31 ENCOUNTER — OFFICE VISIT (OUTPATIENT)
Dept: BEHAVIORAL/MENTAL HEALTH CLINIC | Facility: CLINIC | Age: 29
End: 2019-01-31
Payer: COMMERCIAL

## 2019-01-31 DIAGNOSIS — F41.9 ANXIETY: ICD-10-CM

## 2019-01-31 PROCEDURE — 90834 PSYTX W PT 45 MINUTES: CPT | Performed by: SOCIAL WORKER

## 2019-01-31 NOTE — PSYCH
Psychotherapy Provided: Individual Psychotherapy 50 minutes     Length of time in session: 50 minutes, follow up in 2 week    Goals addressed in session: Goal 1 and Goal 2     Pain:      none    0    Current suicide risk : Low     D:Amrik spoke again today at great length about his feelings of frustration re: continuing to have his mother-in-law reside with he and his paramour  A:  Ulysses Mariano appears to be the caretaker for his paramour  He does not believe he can voice his feelings to his paramour as she suffers from debilitating anxiety herself  P:  Upcoming sessions will be used to support him with the above  Behavioral Health Treatment Plan [facility]: Diagnosis and Treatment Plan explained to Anju Laird relates understanding diagnosis and is agreeable to Treatment Plan   Yes

## 2019-02-11 ENCOUNTER — TELEPHONE (OUTPATIENT)
Dept: GASTROENTEROLOGY | Facility: AMBULARY SURGERY CENTER | Age: 29
End: 2019-02-11

## 2019-02-11 NOTE — TELEPHONE ENCOUNTER
I called pt, unable to lvm- VM box is full       Precharting and noticed pt has orders for Hpylori testing that should be completed before OV (2 month f/u ) with Dr Ashok Kilgore on 2/22/19, Mailed letter asking pt to call ofc

## 2019-02-11 NOTE — LETTER
February 11, 2019    Monica Salcedo  [address]  [facility] [address]      Dear Mr Basurto:        We have attempted to reach you regarding your follow up appointment with Dr Mary Calixto  I have attached laboratory orders for you to have done  Please have these labs drawn at least 1 week before your appointment  As a reminder, your follow up appointment is scheduled for Friday February 22, 2019 at 9:00 am   Thank you in advance for your cooperation and assistance  Please call our office with any questions           Sincerely,             [facility]

## 2019-02-22 ENCOUNTER — OFFICE VISIT (OUTPATIENT)
Dept: BEHAVIORAL/MENTAL HEALTH CLINIC | Facility: CLINIC | Age: 29
End: 2019-02-22
Payer: COMMERCIAL

## 2019-02-22 DIAGNOSIS — F41.9 ANXIETY: ICD-10-CM

## 2019-02-22 PROCEDURE — 90834 PSYTX W PT 45 MINUTES: CPT | Performed by: SOCIAL WORKER

## 2019-02-22 NOTE — PSYCH
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

## 2019-03-20 ENCOUNTER — OFFICE VISIT (OUTPATIENT)
Dept: URGENT CARE | Age: 29
End: 2019-03-20
Payer: COMMERCIAL

## 2019-03-20 VITALS
SYSTOLIC BLOOD PRESSURE: 128 MMHG | WEIGHT: 228 LBS | BODY MASS INDEX: 30.88 KG/M2 | HEIGHT: 72 IN | HEART RATE: 82 BPM | RESPIRATION RATE: 18 BRPM | DIASTOLIC BLOOD PRESSURE: 73 MMHG | OXYGEN SATURATION: 96 % | TEMPERATURE: 98 F

## 2019-03-20 DIAGNOSIS — J01.90 ACUTE SINUSITIS, RECURRENCE NOT SPECIFIED, UNSPECIFIED LOCATION: Primary | ICD-10-CM

## 2019-03-20 PROCEDURE — 99213 OFFICE O/P EST LOW 20 MIN: CPT | Performed by: PHYSICIAN ASSISTANT

## 2019-03-20 PROCEDURE — S9088 SERVICES PROVIDED IN URGENT: HCPCS | Performed by: PHYSICIAN ASSISTANT

## 2019-03-20 RX ORDER — FLUTICASONE PROPIONATE 50 MCG
1 SPRAY, SUSPENSION (ML) NASAL DAILY
Qty: 1 BOTTLE | Refills: 0 | Status: SHIPPED | OUTPATIENT
Start: 2019-03-20 | End: 2019-09-23 | Stop reason: ALTCHOICE

## 2019-03-20 NOTE — LETTER
March 20, 2019     Patient: Sandi Benitez   YOB: 1990   Date of Visit: 3/20/2019       To Whom It May Concern: It is my medical opinion that Sandi Benitez may return to work on 3/22/2019  If you have any questions or concerns, please don't hesitate to call           Sincerely,        Bao Calvin PA-C    CC: No Recipients
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
## 2019-03-20 NOTE — PATIENT INSTRUCTIONS
Mucinex DM and flonase as prescribed  Tylenol over the counter  Warm compresses over sinuses  Steam treatment (practice proper safety precautions when handing hot liquids/steam)  Over the counter saline nasal spray  Follow up with PCP in 3-5 days  Proceed to  ER if symptoms worsen  Sinusitis   WHAT YOU NEED TO KNOW:   Sinusitis is inflammation or infection of your sinuses  It is most often caused by a virus  Acute sinusitis may last up to 12 weeks  Chronic sinusitis lasts longer than 12 weeks  Recurrent sinusitis means you have 4 or more times in 1 year  DISCHARGE INSTRUCTIONS:   Return to the emergency department if:   · Your eye and eyelid are red, swollen, and painful  · You cannot open your eye  · You have vision changes, such as double vision  · Your eyeball bulges out or you cannot move your eye  · You are more sleepy than normal, or you notice changes in your ability to think, move, or talk  · You have a stiff neck, a fever, or a bad headache  · You have swelling of your forehead or scalp  Contact your healthcare provider if:   · Your symptoms do not improve after 3 days  · Your symptoms do not go away after 10 days  · You have nausea and are vomiting  · Your nose is bleeding  · You have questions or concerns about your condition or care  Medicines: Your symptoms may go away on their own  Your healthcare provider may recommend watchful waiting for up to 10 days before starting antibiotics  You may  need any of the following:  · Acetaminophen  decreases pain and fever  It is available without a doctor's order  Ask how much to take and how often to take it  Follow directions  Read the labels of all other medicines you are using to see if they also contain acetaminophen, or ask your doctor or pharmacist  Acetaminophen can cause liver damage if not taken correctly  Do not use more than 4 grams (4,000 milligrams) total of acetaminophen in one day       · NSAIDs , such as ibuprofen, help decrease swelling, pain, and fever  This medicine is available with or without a doctor's order  NSAIDs can cause stomach bleeding or kidney problems in certain people  If you take blood thinner medicine, always ask your healthcare provider if NSAIDs are safe for you  Always read the medicine label and follow directions  · Nasal steroid sprays  may help decrease inflammation in your nose and sinuses  · Decongestants  help reduce swelling and drain mucus in the nose and sinuses  They may help you breathe easier  · Antihistamines  help dry mucus in the nose and relieve sneezing  · Antibiotics  help treat or prevent a bacterial infection  · Take your medicine as directed  Contact your healthcare provider if you think your medicine is not helping or if you have side effects  Tell him or her if you are allergic to any medicine  Keep a list of the medicines, vitamins, and herbs you take  Include the amounts, and when and why you take them  Bring the list or the pill bottles to follow-up visits  Carry your medicine list with you in case of an emergency  Self-care:   · Rinse your sinuses  Use a sinus rinse device to rinse your nasal passages with a saline (salt water) solution or distilled water  Do not use tap water  This will help thin the mucus in your nose and rinse away pollen and dirt  It will also help reduce swelling so you can breathe normally  Ask your healthcare provider how often to do this  · Breathe in steam   Heat a bowl of water until you see steam  Lean over the bowl and make a tent over your head with a large towel  Breathe deeply for about 20 minutes  Be careful not to get too close to the steam or burn yourself  Do this 3 times a day  You can also breathe deeply when you take a hot shower  · Sleep with your head elevated  Place an extra pillow under your head before you go to sleep to help your sinuses drain  · Drink liquids as directed    Ask your healthcare provider how much liquid to drink each day and which liquids are best for you  Liquids will thin the mucus in your nose and help it drain  Avoid drinks that contain alcohol or caffeine  · Do not smoke, and avoid secondhand smoke  Nicotine and other chemicals in cigarettes and cigars can make your symptoms worse  Ask your healthcare provider for information if you currently smoke and need help to quit  E-cigarettes or smokeless tobacco still contain nicotine  Talk to your healthcare provider before you use these products  Prevent the spread of germs that cause sinusitis:  Wash your hands often with soap and water  Wash your hands after you use the bathroom, change a child's diaper, or sneeze  Wash your hands before you prepare or eat food  Follow up with your healthcare provider as directed: You may be referred to an ear, nose, and throat specialist  Write down your questions so you remember to ask them during your visits  © 2017 [employer] Information is for End User's use only and may not be sold, redistributed or otherwise used for commercial purposes  All illustrations and images included in CareNotes® are the copyrighted property of [employer]  or Dorian Kirby  The above information is an  only  It is not intended as medical advice for individual conditions or treatments  Talk to your doctor, nurse or pharmacist before following any medical regimen to see if it is safe and effective for you

## 2019-04-02 ENCOUNTER — OFFICE VISIT (OUTPATIENT)
Dept: BEHAVIORAL/MENTAL HEALTH CLINIC | Facility: CLINIC | Age: 29
End: 2019-04-02
Payer: COMMERCIAL

## 2019-04-02 DIAGNOSIS — F41.9 ANXIETY: ICD-10-CM

## 2019-04-02 PROCEDURE — 90834 PSYTX W PT 45 MINUTES: CPT | Performed by: SOCIAL WORKER

## 2019-05-01 ENCOUNTER — OFFICE VISIT (OUTPATIENT)
Dept: BEHAVIORAL/MENTAL HEALTH CLINIC | Facility: CLINIC | Age: 29
End: 2019-05-01
Payer: COMMERCIAL

## 2019-05-01 DIAGNOSIS — F41.9 ANXIETY: ICD-10-CM

## 2019-05-01 PROCEDURE — 90834 PSYTX W PT 45 MINUTES: CPT | Performed by: SOCIAL WORKER

## 2019-05-15 PROBLEM — J30.89 ALLERGIC RHINITIS DUE TO HOUSE DUST MITE: Status: ACTIVE | Noted: 2019-05-15

## 2019-05-15 PROBLEM — J30.81 ALLERGIC RHINITIS DUE TO CATS: Status: ACTIVE | Noted: 2019-05-15

## 2019-05-15 PROBLEM — J33.9 NASAL POLYPOSIS: Status: ACTIVE | Noted: 2019-05-15

## 2019-05-15 PROBLEM — L20.84 INTRINSIC ECZEMA: Status: ACTIVE | Noted: 2019-05-15

## 2019-05-15 PROBLEM — Z92.22 PERSONAL HISTORY OF MONOCLONAL DRUG THERAPY: Status: ACTIVE | Noted: 2019-05-15

## 2019-05-15 PROBLEM — Z92.240 PERSONAL HISTORY OF INHALED STEROID THERAPY: Status: ACTIVE | Noted: 2019-05-15

## 2019-05-15 PROBLEM — H10.13 ALLERGIC CONJUNCTIVITIS OF BOTH EYES: Status: ACTIVE | Noted: 2019-05-15

## 2019-05-15 PROBLEM — Z92.241 PERSONAL HISTORY OF SYSTEMIC STEROID THERAPY: Status: ACTIVE | Noted: 2019-05-15

## 2019-05-15 PROBLEM — Z91.048 ALLERGY TO MOLD: Status: ACTIVE | Noted: 2019-05-15

## 2019-05-15 PROBLEM — J32.9 CHRONIC SINUSITIS: Status: ACTIVE | Noted: 2017-11-15

## 2019-05-16 PROBLEM — Z88.6 ALLERGY TO NONSTEROIDAL ANTI-INFLAMMATORY DRUG (NSAID): Status: ACTIVE | Noted: 2019-05-16

## 2019-05-21 ENCOUNTER — OFFICE VISIT (OUTPATIENT)
Dept: BEHAVIORAL/MENTAL HEALTH CLINIC | Facility: CLINIC | Age: 29
End: 2019-05-21
Payer: COMMERCIAL

## 2019-05-21 DIAGNOSIS — F41.9 ANXIETY: Primary | ICD-10-CM

## 2019-05-21 PROCEDURE — 90834 PSYTX W PT 45 MINUTES: CPT | Performed by: SOCIAL WORKER

## 2019-06-06 DIAGNOSIS — J45.50 SEVERE PERSISTENT EXTRINSIC ASTHMA WITHOUT COMPLICATION: ICD-10-CM

## 2019-06-06 RX ORDER — ALBUTEROL SULFATE 90 UG/1
2 AEROSOL, METERED RESPIRATORY (INHALATION) EVERY 6 HOURS PRN
Qty: 1 INHALER | Refills: 5 | Status: SHIPPED | OUTPATIENT
Start: 2019-06-06 | End: 2020-03-11 | Stop reason: SDUPTHER

## 2019-06-11 ENCOUNTER — OFFICE VISIT (OUTPATIENT)
Dept: BEHAVIORAL/MENTAL HEALTH CLINIC | Facility: CLINIC | Age: 29
End: 2019-06-11
Payer: COMMERCIAL

## 2019-06-11 DIAGNOSIS — F41.9 ANXIETY: ICD-10-CM

## 2019-06-11 PROCEDURE — 90834 PSYTX W PT 45 MINUTES: CPT | Performed by: SOCIAL WORKER

## 2019-06-12 ENCOUNTER — TRANSCRIBE ORDERS (OUTPATIENT)
Dept: ADMINISTRATIVE | Facility: HOSPITAL | Age: 29
End: 2019-06-12

## 2019-06-12 ENCOUNTER — HOSPITAL ENCOUNTER (OUTPATIENT)
Dept: RADIOLOGY | Facility: HOSPITAL | Age: 29
Discharge: HOME | End: 2019-06-12
Payer: COMMERCIAL

## 2019-06-12 DIAGNOSIS — M99.01 CERVICAL SEGMENT DYSFUNCTION: Primary | ICD-10-CM

## 2019-06-12 DIAGNOSIS — S13.8XXA SPRAIN OF JOINTS AND LIGAMENTS OF OTHER PARTS OF NECK, INITIAL ENCOUNTER: ICD-10-CM

## 2019-06-12 DIAGNOSIS — M99.01 CERVICAL SEGMENT DYSFUNCTION: ICD-10-CM

## 2019-06-12 DIAGNOSIS — M99.03 SOMATIC DYSFUNCTION OF LUMBAR REGION: ICD-10-CM

## 2019-06-12 DIAGNOSIS — M99.02 THORACIC SEGMENT DYSFUNCTION: ICD-10-CM

## 2019-06-12 PROCEDURE — 72050 X-RAY EXAM NECK SPINE 4/5VWS: CPT

## 2019-06-13 ENCOUNTER — APPOINTMENT (OUTPATIENT)
Dept: RADIOLOGY | Facility: MEDICAL CENTER | Age: 29
End: 2019-06-13
Payer: COMMERCIAL

## 2019-06-13 ENCOUNTER — TRANSCRIBE ORDERS (OUTPATIENT)
Dept: ADMINISTRATIVE | Facility: HOSPITAL | Age: 29
End: 2019-06-13

## 2019-06-13 DIAGNOSIS — M99.03 SOMATIC DYSFUNCTION OF LUMBAR REGION: ICD-10-CM

## 2019-06-13 DIAGNOSIS — M99.01 CERVICAL SEGMENT DYSFUNCTION: ICD-10-CM

## 2019-06-13 DIAGNOSIS — M99.02 THORACIC SEGMENT DYSFUNCTION: ICD-10-CM

## 2019-06-13 DIAGNOSIS — M99.01 CERVICAL SEGMENT DYSFUNCTION: Primary | ICD-10-CM

## 2019-06-13 PROCEDURE — 72100 X-RAY EXAM L-S SPINE 2/3 VWS: CPT

## 2019-06-13 PROCEDURE — 72170 X-RAY EXAM OF PELVIS: CPT

## 2019-07-11 ENCOUNTER — SOCIAL WORK (OUTPATIENT)
Dept: BEHAVIORAL/MENTAL HEALTH CLINIC | Facility: CLINIC | Age: 29
End: 2019-07-11
Payer: COMMERCIAL

## 2019-07-11 DIAGNOSIS — F41.9 ANXIETY: ICD-10-CM

## 2019-07-11 PROCEDURE — 90834 PSYTX W PT 45 MINUTES: CPT | Performed by: SOCIAL WORKER

## 2019-08-12 PROBLEM — J82.83 EOSINOPHILIC ASTHMA: Status: ACTIVE | Noted: 2019-08-12

## 2019-08-26 ENCOUNTER — SOCIAL WORK (OUTPATIENT)
Dept: BEHAVIORAL/MENTAL HEALTH CLINIC | Facility: CLINIC | Age: 29
End: 2019-08-26
Payer: COMMERCIAL

## 2019-08-26 DIAGNOSIS — F41.9 ANXIETY: ICD-10-CM

## 2019-08-26 PROCEDURE — 90834 PSYTX W PT 45 MINUTES: CPT | Performed by: SOCIAL WORKER

## 2019-08-26 NOTE — PSYCH
Psychotherapy Provided: Individual Psychotherapy 50 minutes     Length of time in session: 50 minutes, follow up in 2 week    Goals addressed in session: Goal 1 and Goal 2     Pain:      none    0    Current suicide risk : Low     D:Amrik spoke Today about his feelings re: his job and how much he wants to be a a role that he is passionate about  He shared how much better that he and his fiance have been getting along now that her anxiety is better managed and stated that he no longer feels the need to take medication himself  A:  Adelaida Oscars does not know where he would be more happy  He is unhappy in his current role  He was more able to engage in dialogue with this worker today and his Treatment Plan goals were reviewed and updated  P:  Upcoming sessions will be used to support him with the above  Behavioral Health Treatment Plan [facility]: Diagnosis and Treatment Plan explained to Latesha Joseph relates understanding diagnosis and is agreeable to Treatment Plan   Yes

## 2019-08-26 NOTE — PSYCH
Lorraine Tanja  1990       Date of Initial Treatment Plan: 6/21/18  Date of Current Treatment Plan: 08/26/19    Treatment Plan Number4    Strengths/Personal Resources for Self Care: I care A LOT, I know I have potential, I am productive at task completion, I pay attention to getting things done  Diagnosis: Anxiety, Depression    Area of Needs:  I dont know where my career is going--this makes me anxious  Long Term Goal 1: AI want to be able to help peopele--to make a difference  Target Date:12/26/19  Completion Date: na         Short Term Objectives for Goal 1: AI will consider people I admire and positions I would possibly enjoy  B1I will seek out different avenues, opportunites for career advancement  GOAL 1: Modality: Individual 2x per month   Completion Date na and The person(s) responsible for carrying out the plan is  Polina Rowley [address]: Diagnosis and Treatment Plan explained to Sid Whitney relates understanding diagnosis and is agreeable to Treatment Plan         Client Comments : Please share your thoughts, feelings, need and/or experiences regarding your treatment plan:       __________________________________________________________________

## 2019-09-16 ENCOUNTER — OFFICE VISIT (OUTPATIENT)
Dept: URGENT CARE | Age: 29
End: 2019-09-16
Payer: COMMERCIAL

## 2019-09-16 VITALS
RESPIRATION RATE: 18 BRPM | SYSTOLIC BLOOD PRESSURE: 110 MMHG | BODY MASS INDEX: 29.8 KG/M2 | DIASTOLIC BLOOD PRESSURE: 60 MMHG | OXYGEN SATURATION: 99 % | HEIGHT: 72 IN | TEMPERATURE: 97.7 F | WEIGHT: 220 LBS | HEART RATE: 64 BPM

## 2019-09-16 DIAGNOSIS — F41.9 ANXIETY: Primary | ICD-10-CM

## 2019-09-16 PROCEDURE — S9088 SERVICES PROVIDED IN URGENT: HCPCS | Performed by: PHYSICIAN ASSISTANT

## 2019-09-16 PROCEDURE — 99213 OFFICE O/P EST LOW 20 MIN: CPT | Performed by: PHYSICIAN ASSISTANT

## 2019-09-16 RX ORDER — HYDROXYZINE 50 MG/1
50 TABLET, FILM COATED ORAL 3 TIMES DAILY PRN
Qty: 30 TABLET | Refills: 0 | Status: SHIPPED | OUTPATIENT
Start: 2019-09-16 | End: 2019-09-23
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
## 2019-09-16 NOTE — PATIENT INSTRUCTIONS
Use Atarax as directed  Continue use Tylenol as needed for headaches  Follow-up as scheduled  Follow up with PCP in 3-5 days  Proceed to  ER if symptoms worsen  Anxiety   AMBULATORY CARE:   Anxiety  is a condition that causes you to feel extremely worried or nervous  The feelings are so strong that they can cause problems with your daily activities or sleep  Anxiety may be triggered by something you fear, or it may happen without a cause  Family or work stress, smoking, caffeine, and alcohol can increase your risk for anxiety  Certain medicines or health conditions can also increase your risk  Anxiety can become a long-term condition if it is not managed or treated  Common signs and symptoms that may occur with anxiety:   · Fatigue or muscle tightness     · Shaking, restlessness, or irritability     · Problems focusing     · Trouble sleeping     · Feeling jumpy, easily startled, or dizzy     · Rapid heartbeat or shortness of breath  Call 911 if:   · You have chest pain, tightness, or heaviness that may spread to your shoulders, arms, jaw, neck, or back  · You feel like hurting yourself or someone else  Contact your healthcare provider if:   · Your symptoms get worse or do not get better with treatment  · You think your medicine may be causing side effects  · Your anxiety keeps you from doing your regular daily activities  · You have new symptoms since your last visit  · You have questions or concerns about your condition or care  Treatment for anxiety  may include medicines to help you feel calm and relaxed, and decrease your symptoms  Medicines are usually given together with therapy or other treatments  Manage anxiety:   · Talk to someone about your anxiety  Your healthcare provider may suggest counseling  Cognitive behavioral therapy can help you understand and change how you react to events that trigger your symptoms   You might feel more comfortable talking with a friend or family member about your anxiety  Choose someone you know will be supportive and encouraging  · Find ways to relax  Activities such as exercise, meditation, or listening to music can help you relax  Spend time with friends, or do things you enjoy  · Practice deep breathing  Deep breathing can help you relax when you feel anxious  Focus on taking slow, deep breaths several times a day, or during an anxiety attack  Breathe in through your nose and out through your mouth  · Create a regular sleep routine  Regular sleep can help you feel calmer during the day  Go to sleep and wake up at the same times every day  Do not watch television or use the computer right before bed  Your room should be comfortable, dark, and quiet  · Eat a variety of healthy foods  Healthy foods include fruits, vegetables, low-fat dairy products, lean meats, fish, whole-grain breads, and cooked beans  Healthy foods can help you feel less anxious and have more energy  · Exercise regularly  Exercise can increase your energy level  Exercise may also lift your mood and help you sleep better  Your healthcare provider can help you create an exercise plan  · Do not smoke  Nicotine and other chemicals in cigarettes and cigars can increase anxiety  Ask your healthcare provider for information if you currently smoke and need help to quit  E-cigarettes or smokeless tobacco still contain nicotine  Talk to your healthcare provider before you use these products  · Do not have caffeine  Caffeine can make your symptoms worse  Do not have foods or drinks that are meant to increase your energy level  · Limit or do not drink alcohol  Ask your healthcare provider if alcohol is safe for you  You may not be able to drink alcohol if you take certain anxiety or depression medicines  Limit alcohol to 1 drink per day if you are a woman  Limit alcohol to 2 drinks per day if you are a man   A drink of alcohol is 12 ounces of beer, 5 ounces of wine, or 1½ ounces of liquor  · Do not use drugs  Drugs can make your anxiety worse  It can also make anxiety hard to manage  Talk to your healthcare provider if you use drugs and want help to quit  Follow up with your healthcare provider as directed:  Write down your questions so you remember to ask them during your visits  © 2017 [employer] Information is for End User's use only and may not be sold, redistributed or otherwise used for commercial purposes  All illustrations and images included in CareNotes® are the copyrighted property of [employer]  or Dorian Kirby  The above information is an  only  It is not intended as medical advice for individual conditions or treatments  Talk to your doctor, nurse or pharmacist before following any medical regimen to see if it is safe and effective for you

## 2019-09-16 NOTE — LETTER
September 16, 2019     Patient: Claudine Torres   YOB: 1990   Date of Visit: 9/16/2019       To Whom it May Concern: Claudine Torres was seen in my clinic on 9/16/2019  He may return to work on 09/17/2019  If you have any questions or concerns, please don't hesitate to call  Sincerely,          Sarah Haynes PA-C        CC: No Recipients

## 2019-09-17 ENCOUNTER — SOCIAL WORK (OUTPATIENT)
Dept: BEHAVIORAL/MENTAL HEALTH CLINIC | Facility: CLINIC | Age: 29
End: 2019-09-17
Payer: COMMERCIAL

## 2019-09-17 DIAGNOSIS — F41.9 ANXIETY: ICD-10-CM

## 2019-09-17 DIAGNOSIS — F41.0 PANIC ATTACK: ICD-10-CM

## 2019-09-17 PROCEDURE — 90834 PSYTX W PT 45 MINUTES: CPT | Performed by: SOCIAL WORKER

## 2019-09-19 NOTE — PSYCH
Psychotherapy Provided: Individual Psychotherapy 50 minutes     Length of time in session: 50 minutes, follow up in 2 week    Goals addressed in session: Goal 1 and Goal 2     Pain:      none    0    Current suicide risk : Low     D:Amrik spoke further Today about his feelings re: having had a severe headache following a massage that subsequently triggered several panic attacks  A:  AMAURYNIKOLAI did not appear to recognize the connection to his past history of health issues (near death) and how this head pain triggered his panic  He was moderately receptive to discussing and processing this today  P:  Upcoming sessions will be used to support him with the above  Behavioral Health Treatment Plan [facility]: Diagnosis and Treatment Plan explained to Ree Miss relates understanding diagnosis and is agreeable to Treatment Plan   Yes

## 2019-09-23 ENCOUNTER — OFFICE VISIT (OUTPATIENT)
Dept: FAMILY MEDICINE CLINIC | Facility: CLINIC | Age: 29
End: 2019-09-23
Payer: COMMERCIAL

## 2019-09-23 VITALS
BODY MASS INDEX: 30.53 KG/M2 | SYSTOLIC BLOOD PRESSURE: 140 MMHG | OXYGEN SATURATION: 98 % | HEART RATE: 54 BPM | TEMPERATURE: 98.5 F | RESPIRATION RATE: 14 BRPM | HEIGHT: 72 IN | DIASTOLIC BLOOD PRESSURE: 86 MMHG | WEIGHT: 225.4 LBS

## 2019-09-23 DIAGNOSIS — R00.2 PALPITATIONS: Primary | ICD-10-CM

## 2019-09-23 DIAGNOSIS — J32.9 CHRONIC SINUSITIS, UNSPECIFIED LOCATION: ICD-10-CM

## 2019-09-23 DIAGNOSIS — F41.9 ANXIETY: ICD-10-CM

## 2019-09-23 DIAGNOSIS — E01.0 THYROMEGALY: ICD-10-CM

## 2019-09-23 DIAGNOSIS — J45.40 MODERATE PERSISTENT ASTHMA, UNSPECIFIED WHETHER COMPLICATED: ICD-10-CM

## 2019-09-23 DIAGNOSIS — K21.9 GASTROESOPHAGEAL REFLUX DISEASE WITHOUT ESOPHAGITIS: ICD-10-CM

## 2019-09-23 DIAGNOSIS — B37.0 THRUSH: ICD-10-CM

## 2019-09-23 DIAGNOSIS — J30.9 ALLERGIC RHINITIS, UNSPECIFIED SEASONALITY, UNSPECIFIED TRIGGER: ICD-10-CM

## 2019-09-23 PROCEDURE — 93000 ELECTROCARDIOGRAM COMPLETE: CPT | Performed by: INTERNAL MEDICINE

## 2019-09-23 RX ORDER — ALPRAZOLAM 0.5 MG/1
0.5 TABLET ORAL
Qty: 30 TABLET | Refills: 0 | Status: SHIPPED | OUTPATIENT
Start: 2019-09-23 | End: 2019-11-06 | Stop reason: SDUPTHER

## 2019-09-23 RX ORDER — EPINEPHRINE 0.3 MG/.3ML
0.3 INJECTION SUBCUTANEOUS ONCE
Qty: 0.6 ML | Refills: 3 | Status: SHIPPED | OUTPATIENT
Start: 2019-09-23 | End: 2021-03-16 | Stop reason: SDUPTHER

## 2019-09-23 NOTE — PROGRESS NOTES
Assessment/Plan:         Diagnoses and all orders for this visit:    Palpitations; ?? Due to Anxiety ? ? ; RTC in 1mo w :  -     Echo complete with contrast if indicated; Future  -     Holter monitor - 48 hour; Future  -     Comprehensive metabolic panel; Future  -     CBC and differential; Future  -     Hemoglobin A1C; Future  -     Magnesium; Future  -     Sedimentation rate, automated; Future  -     ASO Screen w/ reflex to Titer; Future  -     Lipid panel; Future  -     Vitamin B12; Future  -     Vitamin D 25 hydroxy; Future  -     Urinalysis with reflex to microscopic  -     POCT ECG    Anxiety;   -    TRY :  -     ALPRAZolam (XANAX) 0 5 mg tablet; Take 1 tablet (0 5 mg total) by mouth daily at bedtime as needed for anxiety or sleep 30/0  RTC in 1mo w blood work  -     POCT ECG    Gastroesophageal reflux disease without esophagitis; Continue Omeprazole, RTC in 1mo w ;  -     Comprehensive metabolic panel; Future  -     CBC and differential; Future  -     Hemoglobin A1C; Future  -     Magnesium; Future  -     Sedimentation rate, automated; Future  -     ASO Screen w/ reflex to Titer; Future  -     H  pylori antigen, stool; Pt has to hold Omeprazole for 2 weeks before this test  [facility] Future    Moderate persistent asthma, unspecified whether complicated; Fu w  Allergy, Stable :  -     Comprehensive metabolic panel; Future  -     CBC and differential; Future  -     Hemoglobin A1C; Future  -     Magnesium; Future  -     Sedimentation rate, automated; Future  -     IgE; Future  -     [facility], Adult; Future  -     Food Allergy Profile; Future  -     EPINEPHrine (EPIPEN) 0 3 mg/0 3 mL SOAJ; Inject 0 3 mL (0 3 mg total) into a muscle once for 1 dose    Allergic rhinitis, unspecified seasonality, unspecified trigger  -     IgE; Future  -     [facility]ergy Panel, Adult; Future  -     Food Allergy Profile;  Future  -     EPINEPHrine (EPIPEN) 0 3 mg/0 3 mL SOAJ; Inject 0 3 mL (0 3 mg total) into a muscle once for 1 dose    Thyromegaly;  -     Thyroid Antibodies Panel; Future  -     T4, free; Future  -     TSH, 3rd generation; Future  -     US thyroid; Future    Chronic sinusitis, unspecified location;  -     XR sinuses < 3 vw; Future        Subjective:      Patient ID: Annita Kidney is a 26 y o  male  26 y O Man is here first time in My office, Complete H/P Discussed with pt in Detail, He has few Issues as per R O S , No recent Blood work, med List reviewed w Pt         The following portions of the patient's history were reviewed and updated as appropriate: allergies, current medications, past family history, past medical history, past social history, past surgical history and problem list     Review of Systems   Constitutional: Negative for chills, fatigue and fever  HENT: Positive for postnasal drip  Negative for congestion, facial swelling, sore throat, trouble swallowing and voice change  Eyes: Negative for pain, discharge and visual disturbance  Respiratory: Negative for cough, shortness of breath and wheezing  Cardiovascular: Positive for palpitations  Negative for chest pain and leg swelling  Gastrointestinal: Negative for abdominal pain, blood in stool, constipation, diarrhea and nausea  Endocrine: Negative for polydipsia, polyphagia and polyuria  Genitourinary: Negative for difficulty urinating, hematuria and urgency  Musculoskeletal: Negative for arthralgias and myalgias  Skin: Negative for rash  Neurological: Negative for dizziness, tremors, weakness and headaches  Hematological: Negative for adenopathy  Does not bruise/bleed easily  Psychiatric/Behavioral: Positive for sleep disturbance  Negative for dysphoric mood and suicidal ideas  The patient is nervous/anxious            Objective:      /86 (BP Location: Left arm, Patient Position: Sitting, Cuff Size: Standard)   Pulse (!) 54   Temp 98 5 °F (36 9 °C) (Oral)   Resp 14   Ht 6' (1 829 m)   Wt 102 kg (225 lb 6 4 oz) SpO2 98%   BMI 30 57 kg/m²          Physical Exam   Constitutional: He is oriented to person, place, and time  He appears well-nourished  No distress  HENT:   Head: Normocephalic  Mouth/Throat: Oropharynx is clear and moist  No oropharyngeal exudate  Eyes: Pupils are equal, round, and reactive to light  Conjunctivae are normal  No scleral icterus  Neck: Neck supple  Thyromegaly present  Cardiovascular: Normal rate, regular rhythm and normal heart sounds  No murmur heard  Pulmonary/Chest: Effort normal and breath sounds normal  No respiratory distress  He has no wheezes  He has no rales  Abdominal: Soft  Bowel sounds are normal  He exhibits no distension  There is no tenderness  There is no rebound and no guarding  Musculoskeletal: He exhibits no edema or tenderness  Lymphadenopathy:     He has no cervical adenopathy  Neurological: He is alert and oriented to person, place, and time  No cranial nerve deficit  Coordination normal    Skin: No erythema  Psychiatric: He has a normal mood and affect  BMI Counseling: Body mass index is 30 57 kg/m²  The BMI is above normal  Nutrition recommendations include reducing portion sizes and decreasing overall calorie intake

## 2019-09-23 NOTE — PATIENT INSTRUCTIONS
Low Fat Diet   AMBULATORY CARE:   A low-fat diet  is an eating plan that is low in total fat, unhealthy fat, and cholesterol  You may need to follow a low-fat diet if you have trouble digesting or absorbing fat  You may also need to follow this diet if you have high cholesterol  You can also lower your cholesterol by increasing the amount of fiber in your diet  Soluble fiber is a type of fiber that helps to decrease cholesterol levels  Different types of fat in food:   · Limit unhealthy fats  A diet that is high in cholesterol, saturated fat, and trans fat may cause unhealthy cholesterol levels  Unhealthy cholesterol levels increase your risk of heart disease  ¨ Cholesterol:  Limit intake of cholesterol to less than 200 mg per day  Cholesterol is found in meat, eggs, and dairy  ¨ Saturated fat:  Limit saturated fat to less than 7% of your total daily calories  Ask your dietitian how many calories you need each day  Saturated fat is found in butter, cheese, ice cream, whole milk, and palm oil  Saturated fat is also found in meat, such as beef, pork, chicken skin, and processed meats  Processed meats include sausage, hot dogs, and bologna  ¨ Trans fat:  Avoid trans fat as much as possible  Trans fat is used in fried and baked foods  Foods that say trans fat free on the label may still have up to 0 5 grams of trans fat per serving  · Include healthy fats  Replace foods that are high in saturated and trans fat with foods high in healthy fats  This may help to decrease high cholesterol levels  ¨ Monounsaturated fats: These are found in avocados, nuts, and vegetable oils, such as olive, canola, and sunflower oil  ¨ Polyunsaturated fats: These can be found in vegetable oils, such as soybean or corn oil  Omega-3 fats can help to decrease the risk of heart disease  Omega-3 fats are found in fish, such as salmon, herring, trout, and tuna   Omega-3 fats can also be found in plant foods, such as walnuts, flaxseed, soybeans, and canola oil    Foods to limit or avoid:   · Grains:      ¨ Snacks that are made with partially hydrogenated oils, such as chips, regular crackers, and butter-flavored popcorn    ¨ High-fat baked goods, such as biscuits, croissants, doughnuts, pies, cookies, and pastries    · Dairy:      ¨ Whole milk, 2% milk, and yogurt and ice cream made with whole milk    ¨ Half and half creamer, heavy cream, and whipping cream    ¨ Cheese, cream cheese, and sour cream    · Meats and proteins:      ¨ High-fat cuts of meat (T-bone steak, regular hamburger, and ribs)    ¨ Fried meat, poultry (turkey and chicken), and fish    ¨ Poultry (chicken and turkey) with skin    ¨ Cold cuts (salami or bologna), hot dogs, pimentel, and sausage    ¨ Whole eggs and egg yolks    · Vegetables and fruits with added fat:      ¨ Fried vegetables or vegetables in butter or high-fat sauces, such as cream or cheese sauces    ¨ Fried fruit or fruit served with butter or cream    · Fats:      ¨ Butter, stick margarine, and shortening    ¨ Coconut, palm oil, and palm kernel oil  Foods to include:   · Grains:      ¨ Whole-grain breads, cereals, pasta, and brown rice    ¨ Low-fat crackers and pretzels    · Vegetables and fruits:      ¨ Fresh, frozen, or canned vegetables (no salt or low-sodium)    ¨ Fresh, frozen, dried, or canned fruit (canned in light syrup or fruit juice)    ¨ Avocado    · Low-fat dairy products:      ¨ Nonfat (skim) or 1% milk    ¨ Nonfat or low-fat cheese, yogurt, and cottage cheese    · Meats and proteins:      ¨ Chicken or turkey with no skin    ¨ Baked or broiled fish    ¨ Lean beef and pork (loin, round, extra lean hamburger)    ¨ Beans and peas, unsalted nuts, soy products    ¨ Egg whites and substitutes    ¨ Seeds and nuts    · Fats:      ¨ Unsaturated oil, such as canola, olive, peanut, soybean, or sunflower oil    ¨ Soft or liquid margarine and vegetable oil spread    ¨ Low-fat salad dressing  Other ways to decrease fat:   · Read food labels before you buy foods  Choose foods that have less than 30% of calories from fat  Choose low-fat or fat-free dairy products  Remember that fat free does not mean calorie free  These foods still contain calories, and too many calories can lead to weight gain  · Trim fat from meat and avoid fried food  Trim all visible fat from meat before you cook it  Remove the skin from poultry  Do not worthy meat, fish, or poultry  Bake, roast, boil, or broil these foods instead  Avoid fried foods  Eat a baked potato instead of Western [place] fries  Steam vegetables instead of sautéing them in butter  · Add less fat to foods  Use imitation pimentel bits on salads and baked potatoes instead of regular pimentel bits  Use fat-free or low-fat salad dressings instead of regular dressings  Use low-fat or nonfat butter-flavored topping instead of regular butter or margarine on popcorn and other foods  Ways to decrease fat in recipes:  Replace high-fat ingredients with low-fat or nonfat ones  This may cause baked goods to be drier than usual  You may need to use nonfat cooking spray on pans to prevent food from sticking  You also may need to change the amount of other ingredients, such as water, in the recipe  Try the following:  · Use low-fat or light margarine instead of regular margarine or shortening  · Use lean ground turkey breast or chicken, or lean ground beef (less than 5% fat) instead of hamburger  · Add 1 teaspoon of canola oil to 8 ounces of skim milk instead of using cream or half and half  · Use grated zucchini, carrots, or apples in breads instead of coconut  · Use blenderized, low-fat cottage cheese, plain tofu, or low-fat ricotta cheese instead of cream cheese  · Use 1 egg white and 1 teaspoon of canola oil, or use ¼ cup (2 ounces) of fat-free egg substitute instead of a whole egg       · Replace half of the oil that is called for in a recipe with applesauce when you bake  Use 3 tablespoons of cocoa powder and 1 tablespoon of canola oil instead of a square of baking chocolate  How to increase fiber:  Eat enough high-fiber foods to get 20 to 30 grams of fiber every day  Slowly increase your fiber intake to avoid stomach cramps, gas, and other problems  · Eat 3 ounces of whole-grain foods each day  An ounce is about 1 slice of bread  Eat whole-grain breads, such as whole-wheat bread  Whole wheat, whole-wheat flour, or other whole grains should be listed as the first ingredient on the food label  Replace white flour with whole-grain flour or use half of each in recipes  Whole-grain flour is heavier than white flour, so you may have to add more yeast or baking powder  · Eat a high-fiber cereal for breakfast   Oatmeal is a good source of soluble fiber  Look for cereals that have bran or fiber in the name  Choose whole-grain products, such as brown rice, barley, and whole-wheat pasta  · Eat more beans, peas, and lentils  For example, add beans to soups or salads  Eat at least 5 cups of fruits and vegetables each day  Eat fruits and vegetables with the peel because the peel is high in fiber  © 2017 [facility] Information is for End User's use only and may not be sold, redistributed or otherwise used for commercial purposes  All illustrations and images included in CareNotes® are the copyrighted property of A D A M , Inc  or Dorian Kirby  The above information is an  only  It is not intended as medical advice for individual conditions or treatments  Talk to your doctor, nurse or pharmacist before following any medical regimen to see if it is safe and effective for you  Heart Healthy Diet   AMBULATORY CARE:   A heart healthy diet  is an eating plan low in total fat, unhealthy fats, and sodium (salt)  A heart healthy diet helps decrease your risk for heart disease and stroke   Limit the amount of fat you eat to 25% to 35% of your total daily calories  Limit sodium to less than 2,300 mg each day  Healthy fats:  Healthy fats can help improve cholesterol levels  The risk for heart disease is decreased when cholesterol levels are normal  Choose healthy fats, such as the following:  · Unsaturated fat  is found in foods such as soybean, canola, olive, corn, and safflower oils  It is also found in soft tub margarine that is made with liquid vegetable oil  · Omega-3 fat  is found in certain fish, such as salmon, tuna, and trout, and in walnuts and flaxseed  Unhealthy fats:  Unhealthy fats can cause unhealthy cholesterol levels in your blood and increase your risk of heart disease  Limit unhealthy fats, such as the following:  · Cholesterol  is found in animal foods, such as eggs and lobster, and in dairy products made from whole milk  Limit cholesterol to less than 300 milligrams (mg) each day  You may need to limit cholesterol to 200 mg each day if you have heart disease  · Saturated fat  is found in meats, such as pimentel and hamburger  It is also found in chicken or turkey skin, whole milk, and butter  Limit saturated fat to less than 7% of your total daily calories  Limit saturated fat to less than 6% if you have heart disease or are at increased risk for it  · Trans fat  is found in packaged foods, such as potato chips and cookies  It is also in hard margarine, some fried foods, and shortening  Avoid trans fats as much as possible    Heart healthy foods and drinks to include:  Ask your dietitian or healthcare provider how many servings to have from each of the following food groups:  · Grains:      ¨ Whole-wheat breads, cereals, and pastas, and brown rice    ¨ Low-fat, low-sodium crackers and chips    · Vegetables:      ¨ Broccoli, green beans, green peas, and spinach    ¨ Collards, kale, and lima beans    ¨ Carrots, sweet potatoes, tomatoes, and peppers    ¨ Canned vegetables with no salt added    · Fruits:      ¨ Bananas, peaches, pears, and pineapple    ¨ Grapes, raisins, and dates    ¨ Oranges, tangerines, grapefruit, orange juice, and grapefruit juice    ¨ Apricots, mangoes, melons, and papaya    ¨ Raspberries and strawberries    ¨ Canned fruit with no added sugar    · Low-fat dairy products:      ¨ Nonfat (skim) milk, 1% milk, and low-fat almond, cashew, or soy milks fortified with calcium    ¨ Low-fat cheese, regular or frozen yogurt, and cottage cheese    · Meats and proteins , such as lean cuts of beef and pork (loin, leg, round), skinless chicken and turkey, legumes, soy products, egg whites, and nuts  Foods and drinks to limit or avoid:  Ask your dietitian or healthcare provider about these and other foods that are high in unhealthy fat, sodium, and sugar:  · Snack or packaged foods , such as frozen dinners, cookies, macaroni and cheese, and cereals with more than 300 mg of sodium per serving    · Canned or dry mixes  for cakes, soups, sauces, or gravies    · Vegetables with added sodium , such as instant potatoes, vegetables with added sauces, or regular canned vegetables    · Other foods high in sodium , such as ketchup, barbecue sauce, salad dressing, pickles, olives, soy sauce, and miso    · High-fat dairy foods  such as whole or 2% milk, cream cheese, or sour cream, and cheeses     · High-fat protein foods  such as high-fat cuts of beef (T-bone steaks, ribs), chicken or turkey with skin, and organ meats, such as liver    · Cured or smoked meats , such as hot dogs, pimentel, and sausage    · Unhealthy fats and oils , such as butter, stick margarine, shortening, and cooking oils such as coconut or palm oil    · Food and drinks high in sugar , such as soft drinks (soda), sports drinks, sweetened tea, candy, cake, cookies, pies, and doughnuts  Other diet guidelines to follow:   · Eat more foods containing omega-3 fats  Eat fish high in omega-3 fats at least 2 times a week  · Limit alcohol    Too much alcohol can damage your heart and raise your blood pressure  Women should limit alcohol to 1 drink a day  Men should limit alcohol to 2 drinks a day  A drink of alcohol is 12 ounces of beer, 5 ounces of wine, or 1½ ounces of liquor  · Choose low-sodium foods  High-sodium foods can lead to high blood pressure  Add little or no salt to food you prepare  Use herbs and spices in place of salt  · Eat more fiber  to help lower cholesterol levels  Eat at least 5 servings of fruits and vegetables each day  Eat 3 ounces of whole-grain foods each day  Legumes (beans) are also a good source of fiber  Lifestyle guidelines:   · Do not smoke  Nicotine and other chemicals in cigarettes and cigars can cause lung and heart damage  Ask your healthcare provider for information if you currently smoke and need help to quit  E-cigarettes or smokeless tobacco still contain nicotine  Talk to your healthcare provider before you use these products  · Exercise regularly  to help you maintain a healthy weight and improve your blood pressure and cholesterol levels  Ask your healthcare provider about the best exercise plan for you  Do not start an exercise program without asking your healthcare provider  Follow up with your healthcare provider as directed:  Write down your questions so you remember to ask them during your visits  © 2017 [facility] Information is for End User's use only and may not be sold, redistributed or otherwise used for commercial purposes  All illustrations and images included in CareNotes® are the copyrighted property of [employer]  or Dorian Kirby  The above information is an  only  It is not intended as medical advice for individual conditions or treatments  Talk to your doctor, nurse or pharmacist before following any medical regimen to see if it is safe and effective for you  Calorie Counting Diet   WHAT YOU NEED TO KNOW:   What is a calorie counting diet?   It is a meal plan based on counting calories each day to reach a healthy body weight  You will need to eat fewer calories if you are trying to lose weight  Weight loss may decrease your risk for certain health problems or improve your health if you have health problems  Some of these health problems include heart disease, high blood pressure, and diabetes  What foods should I avoid? Your dietitian will tell you if you need to avoid certain foods based on your body weight and health condition  You may need to avoid high-fat foods if you are at risk for or have heart disease  You may need to eat fewer foods from the breads and starches food group if you have diabetes  How many calories are in foods? The following is a list of foods and drinks with the approximate number of calories in each  Check the food label to find the exact number of calories  A dietitian can tell you how many calories you should have from each food group each day    · Carbohydrate:      ¨ ½ of a 3-inch bagel, 1 slice of bread, or ½ of a hamburger bun or hot dog bun (80)    ¨ 1 (8-inch) flour tortilla or ½ cup of cooked rice (100)    ¨ 1 (6-inch) corn tortilla (80)    ¨ 1 (6-inch) pancake or 1 cup of bran flakes cereal (110)    ¨ ½ cup of cooked cereal (80)    ¨ ½ cup of cooked pasta (85)    ¨ 1 ounce of pretzels (100)    ¨ 3 cups of air-popped popcorn without butter or oil (80)    · Dairy:      ¨ 1 cup of skim or 1% milk (90)    ¨ 1 cup of 2% milk (120)    ¨ 1 cup of whole milk (160)    ¨ 1 cup of 2% chocolate milk (220)    ¨ 1 ounce of low-fat cheese with 3 grams of fat per ounce (70)    ¨ 1 ounce of cheddar cheese (114)    ¨ ½ cup of 1% fat cottage cheese (80)    ¨ 1 cup of plain or sugar-free, fat-free yogurt (90)    · Protein foods:      ¨ 3 ounces of fish (not breaded or fried) (95)    ¨ 3 ounces of breaded, fried fish (195)    ¨ ¾ cup of tuna canned in water (105)    ¨ 3 ounces of chicken breast without skin (105)    ¨ 1 fried chicken breast with skin (350)    ¨ ¼ cup of fat free egg substitute (40)    ¨ 1 large egg (75)    ¨ 3 ounces of lean beef or pork (165)    ¨ 3 ounces of fried pork chop or ham (185)    ¨ ½ cup of cooked dried beans, such as kidney, anthony, lentils, or navy (115)    ¨ 3 ounces of bologna or lunch meat (225)    ¨ 2 links of breakfast sausage (140)    · Vegetables:      ¨ ½ cup of sliced mushrooms (10)    ¨ 1 cup of salad greens, such as lettuce, spinach, or maldonado (15)    ¨ ½ cup of steamed asparagus (20)    ¨ ½ cup of cooked summer squash, zucchini squash, or green or wax beans (25)    ¨ 1 cup of broccoli or cauliflower florets, or 1 medium tomato (25)    ¨ 1 large raw carrot or ½ cup of cooked carrots (40)    ¨ ? of a medium cucumber or 1 stalk of celery (5)    ¨ 1 small baked potato (160)    ¨ 1 cup of breaded, fried vegetables (230)    · Fruit:      ¨ 1 (6-inch) banana (55)     ¨ ½ of a 4-inch grapefruit (55)    ¨ 15 grapes (60)    ¨ 1 medium orange or apple (70)    ¨ 1 large peach (65)    ¨ 1 cup of fresh pineapple chunks (75)    ¨ 1 cup of melon cubes (50)    ¨ 1¼ cups of whole strawberries (45)    ¨ ½ cup of fruit canned in juice (55)    ¨ ½ cup of fruit canned in heavy syrup (110)    ¨ ?  cup of raisins (130)    ¨ ½ cup of unsweetened fruit juice (60)    ¨ ½ cup of grape, cranberry, or prune juice (90)    · Fat:      ¨ 10 peanuts or 2 teaspoons of peanut butter (55)    ¨ 2 tablespoons of avocado or 1 tablespoon of regular salad dressing (45)    ¨ 2 slices of pimentel (90)    ¨ 1 teaspoon of oil, such as safflower, canola, corn, or olive oil (45)    ¨ 2 teaspoons of low-fat margarine, or 1 tablespoon of low-fat mayonnaise (50)    ¨ 1 teaspoon of regular margarine (40)    ¨ 1 tablespoon of regular mayonnaise (135)    ¨ 1 tablespoon of cream cheese or 2 tablespoons of low-fat cream cheese (45)    ¨ 2 tablespoons of vegetable shortening (215)    · Dessert and sweets:      ¨ 8 animal crackers or 5 vanilla wafers (80)    ¨ 1 frozen fruit juice bar (80)    ¨ ½ cup of ice milk or low-fat frozen yogurt (90)    ¨ ½ cup of sherbet or sorbet (125)    ¨ ½ cup of sugar-free pudding or custard (60)    ¨ ½ cup of ice cream (140)    ¨ ½ cup of pudding or custard (175)    ¨ 1 (2-inch) square chocolate brownie (185)    · Combination foods:      ¨ Bean burrito made with an 8-inch tortilla, without cheese (275)    ¨ Chicken breast sandwich with lettuce and tomato (325)    ¨ 1 cup of chicken noodle soup (60)    ¨ 1 beef taco (175)    ¨ Regular hamburger with lettuce and tomato (310)    ¨ Regular cheeseburger with lettuce and tomato (410)     ¨ ¼ of a 12-inch cheese pizza (280)    ¨ Fried fish sandwich with lettuce and tomato (425)    ¨ Hot dog and bun (275)    ¨ 1½ cups of macaroni and cheese (310)    ¨ Taco salad with a fried tortilla shell (870)    · Low-calorie foods:      ¨ 1 tablespoon of ketchup or 1 tablespoon of fat free sour cream (15)    ¨ 1 teaspoon of mustard (5)    ¨ ¼ cup of salsa (20)    ¨ 1 large dill pickle (15)    ¨ 1 tablespoon of fat free salad dressing (10)    ¨ 2 teaspoons of low-sugar, light jam or jelly, or 1 tablespoon of sugar-free syrup (15)    ¨ 1 sugar-free popsicle (15)    ¨ 1 cup of club soda, seltzer water, or diet soda (0)  CARE AGREEMENT:   You have the right to help plan your care  Discuss treatment options with your caregivers to decide what care you want to receive  You always have the right to refuse treatment  The above information is an  only  It is not intended as medical advice for individual conditions or treatments  Talk to your doctor, nurse or pharmacist before following any medical regimen to see if it is safe and effective for you  © 2017 [address] Information is for End User's use only and may not be sold, redistributed or otherwise used for commercial purposes   All illustrations and images included in CareNotes® are the copyrighted property of A [employer] A [employer]  or [employer]

## 2019-10-08 ENCOUNTER — HOSPITAL ENCOUNTER (OUTPATIENT)
Dept: NON INVASIVE DIAGNOSTICS | Facility: CLINIC | Age: 29
Discharge: HOME | End: 2019-10-08
Payer: COMMERCIAL

## 2019-10-08 DIAGNOSIS — R00.2 PALPITATIONS: ICD-10-CM

## 2019-10-08 PROCEDURE — 93306 TTE W/DOPPLER COMPLETE: CPT

## 2019-10-08 PROCEDURE — 93225 XTRNL ECG REC<48 HRS REC: CPT

## 2019-10-08 PROCEDURE — 93226 XTRNL ECG REC<48 HR SCAN A/R: CPT

## 2019-10-08 PROCEDURE — 93306 TTE W/DOPPLER COMPLETE: CPT | Performed by: INTERNAL MEDICINE

## 2019-10-09 DIAGNOSIS — I77.810 DILATED AORTIC ROOT (HCC): Primary | ICD-10-CM

## 2019-10-11 ENCOUNTER — TELEPHONE (OUTPATIENT)
Dept: FAMILY MEDICINE CLINIC | Facility: CLINIC | Age: 29
End: 2019-10-11

## 2019-10-13 PROCEDURE — 93227 XTRNL ECG REC<48 HR R&I: CPT | Performed by: INTERNAL MEDICINE

## 2019-10-23 ENCOUNTER — SOCIAL WORK (OUTPATIENT)
Dept: BEHAVIORAL/MENTAL HEALTH CLINIC | Facility: CLINIC | Age: 29
End: 2019-10-23
Payer: COMMERCIAL

## 2019-10-23 DIAGNOSIS — F41.9 ANXIETY: ICD-10-CM

## 2019-10-23 PROCEDURE — 90834 PSYTX W PT 45 MINUTES: CPT | Performed by: SOCIAL WORKER

## 2019-10-24 NOTE — PSYCH
Psychotherapy Provided: Individual Psychotherapy 50 minutes     Length of time in session: 50 minutes, follow up in 2 week    Goals addressed in session: Goal 1 and Goal 2     Pain:      none    0    Current suicide risk : Low     D:Amrik spoke further Today about his feelings re: his struggles to function over the past several weeks  He indicated that he has had to leave work on several occasions and has asked his paramour for assistance and support with his anxiety  He shared that he is upset and frustrated with his new PCP as he is being asked to have a multitude of tests done that are all causing significant anxiety  A:  Peyton Leo did not appear to recognize the connection to his past history of health issues and the reasons that his new PCP may be ordering these tests  He is requesting to be transferred to a new dr at this time    P:  Upcoming sessions will be used to support him with the above  Behavioral Health Treatment Plan [facility]: Diagnosis and Treatment Plan explained to Springmiya Tomas relates understanding diagnosis and is agreeable to Treatment Plan   Yes

## 2019-10-25 ENCOUNTER — HOSPITAL ENCOUNTER (OUTPATIENT)
Dept: RADIOLOGY | Age: 29
Discharge: HOME | End: 2019-10-25
Payer: COMMERCIAL

## 2019-10-25 DIAGNOSIS — I77.810 DILATED AORTIC ROOT (HCC): ICD-10-CM

## 2019-10-25 PROCEDURE — 71275 CT ANGIOGRAPHY CHEST: CPT

## 2019-10-25 RX ADMIN — IOHEXOL 100 ML: 350 INJECTION, SOLUTION INTRAVENOUS at 11:49

## 2019-10-27 DIAGNOSIS — I77.810 DILATED AORTIC ROOT (HCC): Primary | ICD-10-CM

## 2019-11-04 PROBLEM — Z92.241 PERSONAL HISTORY OF SYSTEMIC STEROID THERAPY: Status: ACTIVE | Noted: 2019-11-04

## 2019-11-04 PROBLEM — K21.9 LARYNGOPHARYNGEAL REFLUX (LPR): Status: ACTIVE | Noted: 2019-11-04

## 2019-11-04 PROBLEM — Z91.013 SHELLFISH ALLERGY: Status: ACTIVE | Noted: 2019-11-04

## 2019-11-04 PROBLEM — J82.83 EOSINOPHILIC ASTHMA: Status: ACTIVE | Noted: 2019-11-04

## 2019-11-04 PROBLEM — D72.10 EOSINOPHILIA: Status: ACTIVE | Noted: 2019-11-04

## 2019-11-04 PROBLEM — H10.9 CONJUNCTIVITIS: Status: ACTIVE | Noted: 2019-08-12

## 2019-11-06 ENCOUNTER — OFFICE VISIT (OUTPATIENT)
Dept: PSYCHIATRY | Facility: CLINIC | Age: 29
End: 2019-11-06
Payer: COMMERCIAL

## 2019-11-06 VITALS
HEIGHT: 72 IN | BODY MASS INDEX: 29.93 KG/M2 | HEART RATE: 74 BPM | SYSTOLIC BLOOD PRESSURE: 114 MMHG | WEIGHT: 221 LBS | DIASTOLIC BLOOD PRESSURE: 71 MMHG

## 2019-11-06 DIAGNOSIS — F41.0 PANIC ATTACKS: ICD-10-CM

## 2019-11-06 DIAGNOSIS — G47.00 INSOMNIA, UNSPECIFIED TYPE: ICD-10-CM

## 2019-11-06 DIAGNOSIS — F41.1 GENERALIZED ANXIETY DISORDER: ICD-10-CM

## 2019-11-06 DIAGNOSIS — F33.1 MODERATE RECURRENT MAJOR DEPRESSION (HCC): Primary | ICD-10-CM

## 2019-11-06 PROCEDURE — 90792 PSYCH DIAG EVAL W/MED SRVCS: CPT | Performed by: PHYSICIAN ASSISTANT

## 2019-11-06 RX ORDER — ALPRAZOLAM 0.5 MG/1
0.5 TABLET ORAL
Qty: 30 TABLET | Refills: 1 | Status: SHIPPED | OUTPATIENT
Start: 2019-11-06 | End: 2019-12-27 | Stop reason: SDUPTHER

## 2019-11-06 RX ORDER — HYDROXYZINE 50 MG/1
TABLET, FILM COATED ORAL
Qty: 30 TABLET | Refills: 1 | Status: SHIPPED | OUTPATIENT
Start: 2019-11-06 | End: 2019-12-27 | Stop reason: SINTOL

## 2019-11-06 RX ORDER — FLUOXETINE HYDROCHLORIDE 20 MG/1
20 CAPSULE ORAL DAILY
Qty: 30 CAPSULE | Refills: 1 | Status: SHIPPED | OUTPATIENT
Start: 2019-11-06 | End: 2019-12-27 | Stop reason: SDUPTHER

## 2019-11-06 NOTE — BH TREATMENT PLAN
TREATMENT PLAN (Medication Management Only)        [facility]    Name and Date of Birth: Jaleesa Gtz 26 y o  1990  Date of Treatment Plan: November 6, 2019  Diagnosis/Diagnoses:    1  Moderate recurrent major depression ([address] )    2  Generalized anxiety disorder    3  Panic attacks    4  Insomnia, unspecified type      Strengths/Personal Resources for Self-Care: "Compassion; [id number] kind of person"  Area/Areas of need (in own words): "I'm losing focus, I'm losing drive, I'm losing steam in life "  1  Long Term Goal: Maintain mood stability, control anxiety, prevent ETOH abuse/addiction  Target Date: 2 months - 1/6/2020  Person/Persons responsible for completion of goal: Hui Rojas [facility] (therapist)  2  Short Term Objective (s) - How will we reach this goal?:   A  Provider new recommended medication/dosage changes and/or continue medication(s):Pt is having moderate to severe MDD and moderate generalized anxiety with panic attacks for which I discussed options and he opts to try the Fluoxetine  He has ADHD by Hx but I am hesitant to formally diagnose this at this time, given that he has significant mood and anxiety Sxs which could explain lack of focus  I will explore more for ADD/ADHD in the future  I advised him to cut down on ETOH and Pt agrees and intends to  He presently denies any ETOH cravings or withdrawal Sxs and denies any h/o addiction or rehab  I gave the Warm Line to utilize when needed  Treatment plan done and Pt accepts the plan  Start Fluoxetine 20mg (1) cap po qd # 30 R1     Continue  Hydroxyzine 50mg (1/2 - 1) tab po qd - bid prn anxiety or insomnia # 60 R1  Alprazolam 0 5mg (1) tab po qhs  # 30 R1  Continue psychotherapy  Pt to have labwork as ordered by his PMD Dr Deanna Croft--includes TFT, CBC with diff, CMP, Vit D, Vit B12, ASO, UA, ASO, ESR, Mg level, HgbA1c and HPylori  I am adding a Urine Tox    Return 6-8 weeks (availability is tight), call me in 4 weeks to let me know how he is doing, otherwise call any time sooner prn   Target Date: 3-6 months  Person/Persons Responsible for Completion of Goal: Ad[facility] Living   Progress Towards Goals: stable, initiating treatment  Treatment Modality: Medication mgt and psychotherapy  Review due 90 to 120 days from date of this plan: 4-6 months  Expected length of service: ongoing treatment  My Physician/PA/NP and I have developed this plan together and I agree to work on the goals and objectives  I understand the treatment goals that were developed for my treatment

## 2019-11-14 ENCOUNTER — SOCIAL WORK (OUTPATIENT)
Dept: BEHAVIORAL/MENTAL HEALTH CLINIC | Facility: CLINIC | Age: 29
End: 2019-11-14
Payer: COMMERCIAL

## 2019-11-14 DIAGNOSIS — F41.0 PANIC ATTACKS: ICD-10-CM

## 2019-11-14 DIAGNOSIS — F33.1 MODERATE RECURRENT MAJOR DEPRESSION (HCC): ICD-10-CM

## 2019-11-14 PROCEDURE — 90834 PSYTX W PT 45 MINUTES: CPT | Performed by: SOCIAL WORKER

## 2019-11-14 NOTE — PSYCH
Psychotherapy Provided: Individual Psychotherapy 50 minutes     Length of time in session: 50 minutes, follow up in 2 week    Goals addressed in session: Goal 1 and Goal 2     Pain:      none    0    Current suicide risk : Low     D:Amrik spoke again Today about his feelings re: his struggles to function over the past several weeks, particularly his "jolts awake" in a state of panic during sleep  He indicated that he did not go to work today as a result of this  He again stated that he has tried to ask  his paramour for assistance and support with his anxiety, but that she has been of little assistance  He shared that he remains concerned that something will come back "wrong" with him when he completes the tests that his PCP has ordered  He also worries that his paramour will leave him  A:  Meena Jackie indicated, as well, that he has not been doing any of the things that he enjoys --having fires outside, going for walks with his dogs as it is getting colder  He has also not been using the internet for anxiety support and resources  P:  Upcoming sessions will be used to continue to support him with the above  Behavioral Health Treatment Plan [facility]: Diagnosis and Treatment Plan explained to Clementina Stephens relates understanding diagnosis and is agreeable to Treatment Plan   Yes

## 2019-11-20 ENCOUNTER — OFFICE VISIT (OUTPATIENT)
Dept: FAMILY MEDICINE CLINIC | Facility: CLINIC | Age: 29
End: 2019-11-20
Payer: COMMERCIAL

## 2019-11-20 VITALS
RESPIRATION RATE: 16 BRPM | BODY MASS INDEX: 30.24 KG/M2 | OXYGEN SATURATION: 99 % | DIASTOLIC BLOOD PRESSURE: 82 MMHG | HEART RATE: 72 BPM | SYSTOLIC BLOOD PRESSURE: 132 MMHG | HEIGHT: 71 IN | TEMPERATURE: 99 F | WEIGHT: 216 LBS

## 2019-11-20 DIAGNOSIS — F41.1 GENERALIZED ANXIETY DISORDER: Primary | ICD-10-CM

## 2019-11-20 DIAGNOSIS — Z13.1 SCREENING FOR DIABETES MELLITUS: ICD-10-CM

## 2019-11-20 DIAGNOSIS — Z23 NEED FOR INFLUENZA VACCINATION: ICD-10-CM

## 2019-11-20 DIAGNOSIS — Z13.6 SCREENING FOR CARDIOVASCULAR CONDITION: ICD-10-CM

## 2019-11-20 DIAGNOSIS — F41.0 PANIC ATTACKS: ICD-10-CM

## 2019-11-20 DIAGNOSIS — F33.1 MODERATE RECURRENT MAJOR DEPRESSION (HCC): ICD-10-CM

## 2019-11-20 PROCEDURE — 1036F TOBACCO NON-USER: CPT | Performed by: FAMILY MEDICINE

## 2019-11-20 PROCEDURE — 99203 OFFICE O/P NEW LOW 30 MIN: CPT | Performed by: FAMILY MEDICINE

## 2019-11-20 PROCEDURE — 90686 IIV4 VACC NO PRSV 0.5 ML IM: CPT

## 2019-11-20 PROCEDURE — 90471 IMMUNIZATION ADMIN: CPT
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
## 2019-11-21 NOTE — ASSESSMENT & PLAN NOTE
Pt is stable on exam   He is to f/u in 2 months, or sooner PRN  Likely here MDD / COLIN with associated panic attacks  Discussed his medication management today, and I old him that he will need to give the Prozac 20mg QAM about another month to see how it will be effective for him  [place] PDMP was [place] today  Discussed the potential risks / side effects of the medication at length  He is to continue f/u as planned with Psychiatry and Behavioral Health (counseling)

## 2019-12-03 ENCOUNTER — SOCIAL WORK (OUTPATIENT)
Dept: BEHAVIORAL/MENTAL HEALTH CLINIC | Facility: CLINIC | Age: 29
End: 2019-12-03
Payer: COMMERCIAL

## 2019-12-03 DIAGNOSIS — F41.0 PANIC ATTACKS: ICD-10-CM

## 2019-12-03 DIAGNOSIS — F33.1 MODERATE RECURRENT MAJOR DEPRESSION (HCC): ICD-10-CM

## 2019-12-03 PROCEDURE — 90834 PSYTX W PT 45 MINUTES: CPT | Performed by: SOCIAL WORKER

## 2019-12-03 NOTE — PSYCH
Psychotherapy Provided: Individual Psychotherapy 50 minutes     Length of time in session: 50 minutes, follow up in 2 week    Goals addressed in session: Goal 1 and Goal 2     Pain:      none    0    Current suicide risk : Low     D:Amrik spoke Today about his feelings re: his improved functioning over the past several weeks, which may correspond with a "placebo" effect from starting Prozac  He indicated that he has been able to go to work every day and to sleep through the night  He has continued to ask  his paramour for assistance and support with his anxiety on rare occasions  A:  Bashir Katz indicated that he is upset with himself for not following though with his plan to return to working out  He expressed frustration that his mind "is negative" and becomes overrun by anxiety  P:   Upcoming sessions will be used to continue to support him with the above  He will be encouraged to attend the upcoming MBCT group  Behavioral Health Treatment Plan [facility]: Diagnosis and Treatment Plan explained to Jaclyn Luu relates understanding diagnosis and is agreeable to Treatment Plan   Yes

## 2019-12-10 ENCOUNTER — TELEPHONE (OUTPATIENT)
Dept: PULMONOLOGY | Facility: CLINIC | Age: 29
End: 2019-12-10

## 2019-12-10 NOTE — TELEPHONE ENCOUNTER
Received fax to refill patients Singulair  Patient needs an appt set up first   I called him twice and left a message
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
## 2019-12-27 ENCOUNTER — OFFICE VISIT (OUTPATIENT)
Dept: PSYCHIATRY | Facility: CLINIC | Age: 29
End: 2019-12-27
Payer: COMMERCIAL

## 2019-12-27 VITALS — WEIGHT: 222 LBS | HEIGHT: 72 IN | BODY MASS INDEX: 30.07 KG/M2

## 2019-12-27 DIAGNOSIS — F41.1 GENERALIZED ANXIETY DISORDER: ICD-10-CM

## 2019-12-27 DIAGNOSIS — F33.1 MODERATE RECURRENT MAJOR DEPRESSION (HCC): Primary | ICD-10-CM

## 2019-12-27 DIAGNOSIS — G47.00 INSOMNIA, UNSPECIFIED TYPE: ICD-10-CM

## 2019-12-27 DIAGNOSIS — F41.0 PANIC ATTACKS: ICD-10-CM

## 2019-12-27 PROCEDURE — 99214 OFFICE O/P EST MOD 30 MIN: CPT | Performed by: PHYSICIAN ASSISTANT

## 2019-12-27 RX ORDER — FLUOXETINE HYDROCHLORIDE 20 MG/1
20 CAPSULE ORAL DAILY
Qty: 30 CAPSULE | Refills: 1 | Status: SHIPPED | OUTPATIENT
Start: 2019-12-27 | End: 2020-03-05 | Stop reason: SDUPTHER

## 2019-12-27 RX ORDER — FLUOXETINE 10 MG/1
10 CAPSULE ORAL DAILY
Qty: 30 CAPSULE | Refills: 1 | Status: SHIPPED | OUTPATIENT
Start: 2019-12-27 | End: 2020-03-05 | Stop reason: DRUGHIGH

## 2019-12-27 RX ORDER — ALPRAZOLAM 0.5 MG/1
0.5 TABLET ORAL
Qty: 30 TABLET | Refills: 1 | Status: SHIPPED | OUTPATIENT
Start: 2019-12-27 | End: 2020-03-05 | Stop reason: SDUPTHER

## 2020-01-07 ENCOUNTER — SOCIAL WORK (OUTPATIENT)
Dept: BEHAVIORAL/MENTAL HEALTH CLINIC | Facility: CLINIC | Age: 30
End: 2020-01-07
Payer: COMMERCIAL

## 2020-01-07 DIAGNOSIS — F41.1 GENERALIZED ANXIETY DISORDER: Primary | ICD-10-CM

## 2020-01-07 PROCEDURE — 90834 PSYTX W PT 45 MINUTES: CPT | Performed by: SOCIAL WORKER

## 2020-01-08 NOTE — PSYCH
Psychotherapy Provided: Individual Psychotherapy 50 minutes     Length of time in session: 50 minutes, follow up in 2 week    Goals addressed in session: Goal 1 and Goal 2     Pain:      none    0    Current suicide risk : Low     D:Amrik spoke Today about his feelings re: his functioning over the past several weeks, the ongoing panic / anxiety he continues to experience on occasion while working and his meeting with  during which it was determined that he will apply for intermittent   FMLA  A:  Susan Kushal indicated that he is not comfortable returning to see his current Psychiatric provider as a result of the interactions during his last session  This was discussed at length and ways he can better manage his anxiety were processed  P:   Upcoming sessions will be used to continue to support him with the above  He has decided no to attend the upcoming MBCT group  Behavioral Health Treatment Plan [facility]: Diagnosis and Treatment Plan explained to Bhaskar Barahona relates understanding diagnosis and is agreeable to Treatment Plan   Yes

## 2020-01-20 ENCOUNTER — OFFICE VISIT (OUTPATIENT)
Dept: FAMILY MEDICINE CLINIC | Facility: CLINIC | Age: 30
End: 2020-01-20
Payer: COMMERCIAL

## 2020-01-20 VITALS
HEART RATE: 98 BPM | BODY MASS INDEX: 30.68 KG/M2 | TEMPERATURE: 99.1 F | OXYGEN SATURATION: 95 % | WEIGHT: 226.2 LBS | SYSTOLIC BLOOD PRESSURE: 132 MMHG | DIASTOLIC BLOOD PRESSURE: 70 MMHG | RESPIRATION RATE: 16 BRPM

## 2020-01-20 DIAGNOSIS — F41.0 PANIC ATTACKS: Primary | ICD-10-CM

## 2020-01-20 DIAGNOSIS — F33.1 MODERATE RECURRENT MAJOR DEPRESSION (HCC): ICD-10-CM

## 2020-01-20 DIAGNOSIS — J45.50 SEVERE PERSISTENT ASTHMA WITHOUT COMPLICATION: ICD-10-CM

## 2020-01-20 DIAGNOSIS — F41.1 GENERALIZED ANXIETY DISORDER: ICD-10-CM

## 2020-01-20 DIAGNOSIS — J82.83 EOSINOPHILIC ASTHMA: ICD-10-CM

## 2020-01-20 PROCEDURE — 99213 OFFICE O/P EST LOW 20 MIN: CPT | Performed by: FAMILY MEDICINE

## 2020-01-20 RX ORDER — MONTELUKAST SODIUM 10 MG/1
10 TABLET ORAL DAILY
Qty: 90 TABLET | Refills: 3 | Status: SHIPPED | OUTPATIENT
Start: 2020-01-20 | End: 2020-07-20 | Stop reason: SDUPTHER
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
## 2020-01-21 ENCOUNTER — SOCIAL WORK (OUTPATIENT)
Dept: BEHAVIORAL/MENTAL HEALTH CLINIC | Facility: CLINIC | Age: 30
End: 2020-01-21
Payer: COMMERCIAL

## 2020-01-21 DIAGNOSIS — F41.1 GENERALIZED ANXIETY DISORDER: ICD-10-CM

## 2020-01-21 DIAGNOSIS — F33.1 MODERATE RECURRENT MAJOR DEPRESSION (HCC): ICD-10-CM

## 2020-01-21 PROCEDURE — 90834 PSYTX W PT 45 MINUTES: CPT | Performed by: SOCIAL WORKER

## 2020-01-21 NOTE — PSYCH
Psychotherapy Provided: Individual Psychotherapy 50 minutes     Length of time in session: 50 minutes, follow up in 2 week    Goals addressed in session: Goal 1 and Goal 2     Pain:      none    0    Current suicide risk : Low     D:Amrik spoke at length Today about his feelings re: his dissatisfaction with his job role  Reasons for these feelings were processed in detail  A:  Kenny Smith did not present as overly depressed or anxious today despite the above nor did he indicate that he has had difficulties in his interactions with his paramuor since his last session  He is also looking forward to going camping with his friend group next weekend  P:   Upcoming sessions will be used to continue to support him with the above  He has decided no to attend the upcoming MBCT group  Behavioral Health Treatment Plan [facility]: Diagnosis and Treatment Plan explained to Vicky Menezes relates understanding diagnosis and is agreeable to Treatment Plan   Yes

## 2020-02-06 ENCOUNTER — SOCIAL WORK (OUTPATIENT)
Dept: BEHAVIORAL/MENTAL HEALTH CLINIC | Facility: CLINIC | Age: 30
End: 2020-02-06
Payer: COMMERCIAL

## 2020-02-06 DIAGNOSIS — F41.1 GENERALIZED ANXIETY DISORDER: ICD-10-CM

## 2020-02-06 DIAGNOSIS — F33.1 MODERATE RECURRENT MAJOR DEPRESSION (HCC): ICD-10-CM

## 2020-02-06 PROCEDURE — 90834 PSYTX W PT 45 MINUTES: CPT | Performed by: SOCIAL WORKER

## 2020-02-06 NOTE — PSYCH
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

## 2020-02-18 NOTE — TELEPHONE ENCOUNTER
----- Message from [facility] sent at 2/17/2020 12:51 PM EST -----  Regarding: has one day left of medicaton  Please refill-[facility]

## 2020-02-18 NOTE — TELEPHONE ENCOUNTER
Good morning Cory Taylor sent me this message yesterday and I sent a message back to her asked what medication he was referring to  His current Rxs should last up to and including 2/25/2020  He should not be out today  Can you call the Pt and/or his pharmacy if need be to get more details  Was he shorted by the pharmacy?

## 2020-02-18 NOTE — TELEPHONE ENCOUNTER
I spoke with the pharmacist at [facility]  Chema Izquierdo called for refills yesterday  He did have refills remaining and the medication is waiting for   The pharmacist reviewed he has no remaining refills and related he has an appointment 03/05/20 and will get refills then

## 2020-02-24 ENCOUNTER — SOCIAL WORK (OUTPATIENT)
Dept: BEHAVIORAL/MENTAL HEALTH CLINIC | Facility: CLINIC | Age: 30
End: 2020-02-24
Payer: COMMERCIAL

## 2020-02-24 DIAGNOSIS — F33.1 MODERATE RECURRENT MAJOR DEPRESSION (HCC): ICD-10-CM

## 2020-02-24 DIAGNOSIS — F41.1 GENERALIZED ANXIETY DISORDER: ICD-10-CM

## 2020-02-24 PROCEDURE — 90834 PSYTX W PT 45 MINUTES: CPT | Performed by: SOCIAL WORKER

## 2020-02-24 NOTE — PSYCH
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

## 2020-02-24 NOTE — BH TREATMENT PLAN
Morteza Willams  1990         Date of Initial Treatment Plan: 6/21/18  Date of Current Treatment Plan: 2/24/20     Treatment Plan Number5     Strengths/Personal Resources for Self Care: I care A LOT, I know I have potential, I am productive at task completion, I pay attention to getting things done       Diagnosis: Anxiety, Depression     Area of Needs:  I worry too much about what people think  I need to get my health under control        Long Term Goal 1: AI want to be able to have the mental capacity to keep going after my work day is done  Target Date:8/24/20  Completion Date: na         Short Term Objectives for Goal 1: AI will pay attention to how much negative energy "weighs" on me   B1I will practice putting more of my thoughts into action        GOAL 1: Modality: Individual 2x per month   Completion Date na and The person(s) responsible for carrying out the plan is  Amrik, [address] Treatment Plan [facility]: Diagnosis and Treatment Plan explained to Bela Perez relates understanding diagnosis and is agreeable to Treatment Plan          Client Comments : Please share your thoughts, feelings, need and/or experiences regarding your treatment plan:         __________________________________________________________________

## 2020-03-05 ENCOUNTER — OFFICE VISIT (OUTPATIENT)
Dept: PSYCHIATRY | Facility: CLINIC | Age: 30
End: 2020-03-05
Payer: COMMERCIAL

## 2020-03-05 VITALS
HEART RATE: 60 BPM | SYSTOLIC BLOOD PRESSURE: 128 MMHG | DIASTOLIC BLOOD PRESSURE: 70 MMHG | WEIGHT: 232 LBS | BODY MASS INDEX: 31.46 KG/M2

## 2020-03-05 DIAGNOSIS — F41.0 PANIC ATTACKS: ICD-10-CM

## 2020-03-05 DIAGNOSIS — F33.1 MODERATE RECURRENT MAJOR DEPRESSION (HCC): Primary | ICD-10-CM

## 2020-03-05 DIAGNOSIS — G47.00 INSOMNIA, UNSPECIFIED TYPE: ICD-10-CM

## 2020-03-05 DIAGNOSIS — F41.1 GENERALIZED ANXIETY DISORDER: ICD-10-CM

## 2020-03-05 PROCEDURE — 96127 BRIEF EMOTIONAL/BEHAV ASSMT: CPT | Performed by: PHYSICIAN ASSISTANT

## 2020-03-05 PROCEDURE — 99214 OFFICE O/P EST MOD 30 MIN: CPT | Performed by: PHYSICIAN ASSISTANT

## 2020-03-05 RX ORDER — ALPRAZOLAM 0.5 MG/1
TABLET ORAL
Qty: 30 TABLET | Refills: 1 | Status: SHIPPED | OUTPATIENT
Start: 2020-03-17 | End: 2021-03-16 | Stop reason: SDUPTHER

## 2020-03-05 RX ORDER — FLUOXETINE HYDROCHLORIDE 20 MG/1
20 CAPSULE ORAL DAILY
Qty: 90 CAPSULE | Refills: 0 | Status: SHIPPED | OUTPATIENT
Start: 2020-03-05 | End: 2020-04-23 | Stop reason: SDUPTHER
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
## 2020-03-11 PROBLEM — J45.50 SEVERE PERSISTENT ASTHMA WITHOUT COMPLICATION: Status: ACTIVE | Noted: 2020-03-11

## 2020-04-07 ENCOUNTER — TELEMEDICINE (OUTPATIENT)
Dept: BEHAVIORAL/MENTAL HEALTH CLINIC | Facility: CLINIC | Age: 30
End: 2020-04-07
Payer: COMMERCIAL

## 2020-04-07 DIAGNOSIS — F41.0 PANIC ATTACKS: ICD-10-CM

## 2020-04-07 DIAGNOSIS — F33.1 MODERATE RECURRENT MAJOR DEPRESSION (HCC): ICD-10-CM

## 2020-04-07 DIAGNOSIS — F41.1 GENERALIZED ANXIETY DISORDER: Primary | ICD-10-CM

## 2020-04-07 PROCEDURE — 90834 PSYTX W PT 45 MINUTES: CPT | Performed by: SOCIAL WORKER

## 2020-04-23 ENCOUNTER — TELEMEDICINE (OUTPATIENT)
Dept: PSYCHIATRY | Facility: CLINIC | Age: 30
End: 2020-04-23
Payer: COMMERCIAL

## 2020-04-23 DIAGNOSIS — F41.0 PANIC ATTACKS: ICD-10-CM

## 2020-04-23 DIAGNOSIS — F33.1 MODERATE RECURRENT MAJOR DEPRESSION (HCC): Primary | ICD-10-CM

## 2020-04-23 DIAGNOSIS — F41.1 GENERALIZED ANXIETY DISORDER: ICD-10-CM

## 2020-04-23 DIAGNOSIS — G47.00 INSOMNIA, UNSPECIFIED TYPE: ICD-10-CM

## 2020-04-23 PROCEDURE — 99214 OFFICE O/P EST MOD 30 MIN: CPT | Performed by: PHYSICIAN ASSISTANT

## 2020-04-23 RX ORDER — FLUOXETINE HYDROCHLORIDE 20 MG/1
20 CAPSULE ORAL DAILY
Qty: 90 CAPSULE | Refills: 0 | Status: SHIPPED | OUTPATIENT
Start: 2020-06-05 | End: 2020-07-16 | Stop reason: SDUPTHER

## 2020-04-28 ENCOUNTER — TELEMEDICINE (OUTPATIENT)
Dept: BEHAVIORAL/MENTAL HEALTH CLINIC | Facility: CLINIC | Age: 30
End: 2020-04-28
Payer: COMMERCIAL

## 2020-04-28 DIAGNOSIS — F41.0 PANIC ATTACKS: ICD-10-CM

## 2020-04-28 DIAGNOSIS — F41.1 GENERALIZED ANXIETY DISORDER: Primary | ICD-10-CM

## 2020-04-28 DIAGNOSIS — F33.1 MODERATE RECURRENT MAJOR DEPRESSION (HCC): ICD-10-CM

## 2020-04-28 PROCEDURE — 90834 PSYTX W PT 45 MINUTES: CPT | Performed by: SOCIAL WORKER

## 2020-05-19 ENCOUNTER — TELEMEDICINE (OUTPATIENT)
Dept: BEHAVIORAL/MENTAL HEALTH CLINIC | Facility: CLINIC | Age: 30
End: 2020-05-19
Payer: COMMERCIAL

## 2020-05-19 DIAGNOSIS — F41.1 GENERALIZED ANXIETY DISORDER: Primary | ICD-10-CM

## 2020-05-19 PROCEDURE — 90834 PSYTX W PT 45 MINUTES: CPT | Performed by: SOCIAL WORKER

## 2020-06-15 ENCOUNTER — TELEMEDICINE (OUTPATIENT)
Dept: BEHAVIORAL/MENTAL HEALTH CLINIC | Facility: CLINIC | Age: 30
End: 2020-06-15
Payer: COMMERCIAL

## 2020-06-15 DIAGNOSIS — F41.0 PANIC ATTACKS: ICD-10-CM

## 2020-06-15 DIAGNOSIS — F33.1 MODERATE RECURRENT MAJOR DEPRESSION (HCC): Primary | ICD-10-CM

## 2020-06-15 PROCEDURE — 90834 PSYTX W PT 45 MINUTES: CPT | Performed by: SOCIAL WORKER
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
## 2020-06-30 ENCOUNTER — TELEMEDICINE (OUTPATIENT)
Dept: BEHAVIORAL/MENTAL HEALTH CLINIC | Facility: CLINIC | Age: 30
End: 2020-06-30
Payer: COMMERCIAL

## 2020-06-30 DIAGNOSIS — F41.0 PANIC ATTACKS: ICD-10-CM

## 2020-06-30 DIAGNOSIS — F33.1 MODERATE RECURRENT MAJOR DEPRESSION (HCC): ICD-10-CM

## 2020-06-30 DIAGNOSIS — F41.1 GENERALIZED ANXIETY DISORDER: Primary | ICD-10-CM

## 2020-06-30 PROCEDURE — 90834 PSYTX W PT 45 MINUTES: CPT | Performed by: SOCIAL WORKER

## 2020-07-15 ENCOUNTER — TELEMEDICINE (OUTPATIENT)
Dept: BEHAVIORAL/MENTAL HEALTH CLINIC | Facility: CLINIC | Age: 30
End: 2020-07-15
Payer: COMMERCIAL

## 2020-07-15 DIAGNOSIS — F41.1 GENERALIZED ANXIETY DISORDER: ICD-10-CM

## 2020-07-15 DIAGNOSIS — F41.0 PANIC ATTACKS: Primary | ICD-10-CM

## 2020-07-15 DIAGNOSIS — F33.1 MODERATE RECURRENT MAJOR DEPRESSION (HCC): ICD-10-CM

## 2020-07-15 PROCEDURE — 90834 PSYTX W PT 45 MINUTES: CPT | Performed by: SOCIAL WORKER
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
## 2020-07-16 ENCOUNTER — TELEMEDICINE (OUTPATIENT)
Dept: PSYCHIATRY | Facility: CLINIC | Age: 30
End: 2020-07-16
Payer: COMMERCIAL

## 2020-07-16 VITALS — HEIGHT: 72 IN | WEIGHT: 231 LBS | BODY MASS INDEX: 31.29 KG/M2

## 2020-07-16 DIAGNOSIS — F41.0 PANIC ATTACKS: ICD-10-CM

## 2020-07-16 DIAGNOSIS — F33.1 MODERATE RECURRENT MAJOR DEPRESSION (HCC): Primary | ICD-10-CM

## 2020-07-16 DIAGNOSIS — G47.00 INSOMNIA, UNSPECIFIED TYPE: ICD-10-CM

## 2020-07-16 DIAGNOSIS — F41.1 GENERALIZED ANXIETY DISORDER: ICD-10-CM

## 2020-07-16 PROCEDURE — 90833 PSYTX W PT W E/M 30 MIN: CPT | Performed by: NURSE PRACTITIONER

## 2020-07-16 PROCEDURE — 99214 OFFICE O/P EST MOD 30 MIN: CPT | Performed by: NURSE PRACTITIONER

## 2020-07-16 RX ORDER — FLUOXETINE HYDROCHLORIDE 20 MG/1
20 CAPSULE ORAL DAILY
Qty: 90 CAPSULE | Refills: 0 | Status: SHIPPED | OUTPATIENT
Start: 2020-07-16 | End: 2020-09-24 | Stop reason: SDUPTHER
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
## 2020-07-20 ENCOUNTER — OFFICE VISIT (OUTPATIENT)
Dept: FAMILY MEDICINE CLINIC | Facility: CLINIC | Age: 30
End: 2020-07-20
Payer: COMMERCIAL

## 2020-07-20 VITALS
WEIGHT: 234.6 LBS | HEIGHT: 72 IN | RESPIRATION RATE: 15 BRPM | HEART RATE: 80 BPM | OXYGEN SATURATION: 98 % | BODY MASS INDEX: 31.77 KG/M2 | SYSTOLIC BLOOD PRESSURE: 114 MMHG | DIASTOLIC BLOOD PRESSURE: 80 MMHG | TEMPERATURE: 97.3 F

## 2020-07-20 DIAGNOSIS — F41.0 PANIC ATTACKS: ICD-10-CM

## 2020-07-20 DIAGNOSIS — F33.1 MODERATE RECURRENT MAJOR DEPRESSION (HCC): ICD-10-CM

## 2020-07-20 DIAGNOSIS — K21.9 GASTROESOPHAGEAL REFLUX DISEASE WITHOUT ESOPHAGITIS: ICD-10-CM

## 2020-07-20 DIAGNOSIS — J45.50 SEVERE PERSISTENT ASTHMA WITHOUT COMPLICATION: Primary | ICD-10-CM

## 2020-07-20 DIAGNOSIS — J30.1 SEASONAL ALLERGIC RHINITIS DUE TO POLLEN: ICD-10-CM

## 2020-07-20 PROCEDURE — 3008F BODY MASS INDEX DOCD: CPT | Performed by: FAMILY MEDICINE

## 2020-07-20 PROCEDURE — 99213 OFFICE O/P EST LOW 20 MIN: CPT | Performed by: FAMILY MEDICINE

## 2020-07-20 RX ORDER — MONTELUKAST SODIUM 10 MG/1
10 TABLET ORAL DAILY
Qty: 90 TABLET | Refills: 3 | Status: SHIPPED | OUTPATIENT
Start: 2020-07-20 | End: 2020-12-15 | Stop reason: SDUPTHER

## 2020-07-20 RX ORDER — ALBUTEROL SULFATE 90 UG/1
2 AEROSOL, METERED RESPIRATORY (INHALATION) EVERY 6 HOURS PRN
Qty: 3 INHALER | Refills: 0 | Status: SHIPPED | OUTPATIENT
Start: 2020-07-20 | End: 2020-12-15 | Stop reason: SDUPTHER

## 2020-07-20 RX ORDER — OMEPRAZOLE 40 MG/1
40 CAPSULE, DELAYED RELEASE ORAL 2 TIMES DAILY
Qty: 180 CAPSULE | Refills: 3 | Status: SHIPPED | OUTPATIENT
Start: 2020-07-20 | End: 2021-09-14
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
## 2020-08-04 ENCOUNTER — TELEMEDICINE (OUTPATIENT)
Dept: BEHAVIORAL/MENTAL HEALTH CLINIC | Facility: CLINIC | Age: 30
End: 2020-08-04
Payer: COMMERCIAL

## 2020-08-04 DIAGNOSIS — F41.1 GENERALIZED ANXIETY DISORDER: Primary | ICD-10-CM

## 2020-08-04 DIAGNOSIS — F33.1 MODERATE RECURRENT MAJOR DEPRESSION (HCC): ICD-10-CM

## 2020-08-04 PROCEDURE — 90834 PSYTX W PT 45 MINUTES: CPT | Performed by: SOCIAL WORKER
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
## 2020-08-31 ENCOUNTER — TELEMEDICINE (OUTPATIENT)
Dept: BEHAVIORAL/MENTAL HEALTH CLINIC | Facility: CLINIC | Age: 30
End: 2020-08-31
Payer: COMMERCIAL

## 2020-08-31 DIAGNOSIS — F41.1 GENERALIZED ANXIETY DISORDER: Primary | ICD-10-CM

## 2020-08-31 DIAGNOSIS — F33.1 MODERATE RECURRENT MAJOR DEPRESSION (HCC): ICD-10-CM

## 2020-08-31 PROCEDURE — 90834 PSYTX W PT 45 MINUTES: CPT | Performed by: SOCIAL WORKER

## 2020-08-31 NOTE — PSYCH
Treatment Plan Tracking    # 1Treatment Plan not completed within required time limits due to: Client is only seen monthly

## 2020-08-31 NOTE — BH TREATMENT PLAN
Sabrina De Leon  1990         Date of Initial Treatment Plan: 6/21/18  Date of Current Treatment Plan: 8/31/20     Treatment Plan Number 6     Strengths/Personal Resources for Self Care: I care A LOT, I know I have potential, I am productive at task completion, I pay attention to getting things done       Diagnosis: Anxiety, Depression     Area of Needs: I spend all my time sharpening my mind  I need to get my health under control        Long Term Goal 1: AI want to be happy  I want to find the motivation to do stuff  Target Date:3/1/21  Completion Date: na         Short Term Objectives for Goal 1: AI will get back to going for walks with the dogs  I will kayak  I will continue working on my [employer]          GOAL 1: Modality: Individual 2x per month   Completion Date na and The person(s) responsible for carrying out the plan is  Jade Rowley [address] Treatment Plan [facility]: Diagnosis and Treatment Plan explained to Alicia Diamondon relates understanding diagnosis and is agreeable to Treatment Plan          Client Comments : Please share your thoughts, feelings, need and/or experiences regarding your treatment plan:    __________________________________________________________________
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
## 2020-09-24 ENCOUNTER — TELEMEDICINE (OUTPATIENT)
Dept: PSYCHIATRY | Facility: CLINIC | Age: 30
End: 2020-09-24
Payer: COMMERCIAL

## 2020-09-24 VITALS — HEIGHT: 72 IN | BODY MASS INDEX: 31.83 KG/M2 | WEIGHT: 235 LBS

## 2020-09-24 DIAGNOSIS — F41.1 GENERALIZED ANXIETY DISORDER: ICD-10-CM

## 2020-09-24 DIAGNOSIS — F33.1 MODERATE RECURRENT MAJOR DEPRESSION (HCC): Primary | ICD-10-CM

## 2020-09-24 DIAGNOSIS — F41.0 PANIC ATTACKS: ICD-10-CM

## 2020-09-24 DIAGNOSIS — G47.00 INSOMNIA, UNSPECIFIED TYPE: ICD-10-CM

## 2020-09-24 PROCEDURE — 99214 OFFICE O/P EST MOD 30 MIN: CPT | Performed by: NURSE PRACTITIONER

## 2020-09-24 PROCEDURE — 90833 PSYTX W PT W E/M 30 MIN: CPT | Performed by: NURSE PRACTITIONER

## 2020-09-24 RX ORDER — FLUOXETINE HYDROCHLORIDE 20 MG/1
20 CAPSULE ORAL DAILY
Qty: 90 CAPSULE | Refills: 0 | Status: SHIPPED | OUTPATIENT
Start: 2020-10-24 | End: 2020-12-09 | Stop reason: SDUPTHER

## 2020-09-24 NOTE — PSYCH
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 supportive family  Weapons: none  The following steps have been taken to ensure weapons are properly secured: not applicable  Based on today's assessment, Ti Galindo presents the following risk of harm to self: minimal    Risk of Harm to Others: The following ratings are based on assessment at the time of the interview  Demographic Risk Factors include: male  Based on today's assessment, Ti Galindo presents the following risk of harm to others: minimal    The following interventions are recommended: no intervention changes needed    Treatment Recommendations/Precautions/Plan:    Ti Galindo reports doing quite well overall since last visit  He denies any panic attacks/anxiety attacks  He states that he has not been using his alprazolam very often at all  He states his anxiety levels have been extremely low almost 2/10, 10 being the worst   He states his depressive symptoms have been very low as well 2/10, 10 being the worst   His sleep patterns have improved he is sleeping at least 8 hours per night and occasionally more    He states that he has not been waking extremely early and he states that he believes his because he is not worried about waking up on time for work because he is now working from home  He is also considering a career change now that he has been working from home due to [place]  He is bad thing around several different ideas and he is unsure if he is going to follow through on any of them at this time  He denies side effects to his current medication regimen which is Prozac 20 mg p o  Daily  He is compliant with taking medication and he feels good on this medication  He has been physically active and reports feeling well overall  Denies SI/HI, denies auditory visual hallucinations, denies delusional thinking  Continues to follow with primary care physician for asthma and all other medical care  Patient has been educated about their diagnosis and treatment modalities  They voiced understanding and agreement with the following plan:    -continue psychotherapy with [facility]     -followup with this provider 12/21/2020 at 4:00 p m      -continue Prozac 20 mg p o  Daily for continued maintenance/improvement in symptoms of anxiety, panic and depression  Ninety day supply sent to pharmacy 09/24/2020 to be placed on hold for patient to fill when ready, patient picked up prescription within the last 30 days which was a 90 day supply       -continue Xanax/alprazolam 0 5 mg p o  Take half to 1 tab by mouth daily as needed for improvement in symptoms of anxiety  Of note patient has not been using this medication and does not require refill       -continue follow-up with PCP for routine medical care, routine yearly labs        -Patient will call if issues or concerns     -Discussed self monitoring of symptoms, and symptom monitoring tools     -Patient has been informed of 24 hours and weekend coverage for urgent situations accessed by calling the main clinic phone number      [facility] Crisis Telephone Numbers and the National Suicide Prevention Hotline Number Provided to Patient     -Treatment Plan completed with psychotherapist Mata hollingsworth on 08/31/2020    Psychotherapy Provided:     Individual psychotherapy provided: Yes  Counseling was provided during the session today for 16 minutes  Medications, treatment progress and treatment plan reviewed with Ulysses Mariano  Medication education provided to Ulysses Mariano  Recent stressor including COVID-19 issues, job stress, health issues, occasional anxiety and Determining if there is a potential career change for him discussed with Ulysses Mariano  Coping strategies reviewed with Ulysses Mariano  Reassurance and supportive therapy provided        I spent 30 minutes directly with the patient during this visit    Randy Gavin 09/24/20

## 2020-09-25 ENCOUNTER — TELEMEDICINE (OUTPATIENT)
Dept: BEHAVIORAL/MENTAL HEALTH CLINIC | Facility: CLINIC | Age: 30
End: 2020-09-25
Payer: COMMERCIAL

## 2020-09-25 DIAGNOSIS — F33.1 MODERATE RECURRENT MAJOR DEPRESSION (HCC): ICD-10-CM

## 2020-09-25 DIAGNOSIS — J96.01 ACUTE RESPIRATORY FAILURE WITH HYPOXIA (HCC): Primary | ICD-10-CM

## 2020-09-25 DIAGNOSIS — F41.0 PANIC ATTACKS: ICD-10-CM

## 2020-09-25 PROCEDURE — 90834 PSYTX W PT 45 MINUTES: CPT | Performed by: SOCIAL WORKER
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
## 2020-10-26 ENCOUNTER — TELEMEDICINE (OUTPATIENT)
Dept: BEHAVIORAL/MENTAL HEALTH CLINIC | Facility: CLINIC | Age: 30
End: 2020-10-26
Payer: COMMERCIAL

## 2020-10-26 DIAGNOSIS — F33.1 MODERATE RECURRENT MAJOR DEPRESSION (HCC): ICD-10-CM

## 2020-10-26 DIAGNOSIS — F41.1 GENERALIZED ANXIETY DISORDER: Primary | ICD-10-CM

## 2020-10-26 PROCEDURE — 90834 PSYTX W PT 45 MINUTES: CPT | Performed by: SOCIAL WORKER

## 2020-12-09 ENCOUNTER — TELEPHONE (OUTPATIENT)
Dept: PSYCHIATRY | Facility: CLINIC | Age: 30
End: 2020-12-09

## 2020-12-09 ENCOUNTER — TELEMEDICINE (OUTPATIENT)
Dept: BEHAVIORAL/MENTAL HEALTH CLINIC | Facility: CLINIC | Age: 30
End: 2020-12-09
Payer: COMMERCIAL

## 2020-12-09 DIAGNOSIS — F33.1 MODERATE RECURRENT MAJOR DEPRESSION (HCC): ICD-10-CM

## 2020-12-09 DIAGNOSIS — F41.0 PANIC ATTACKS: ICD-10-CM

## 2020-12-09 DIAGNOSIS — F41.1 GENERALIZED ANXIETY DISORDER: ICD-10-CM

## 2020-12-09 PROCEDURE — 90834 PSYTX W PT 45 MINUTES: CPT | Performed by: SOCIAL WORKER

## 2020-12-09 RX ORDER — FLUOXETINE HYDROCHLORIDE 20 MG/1
20 CAPSULE ORAL DAILY
Qty: 90 CAPSULE | Refills: 0 | Status: SHIPPED | OUTPATIENT
Start: 2020-12-09 | End: 2021-03-16 | Stop reason: SDUPTHER

## 2020-12-09 NOTE — TELEPHONE ENCOUNTER
prozac 20 mg po daily qty 90 (3 month supply) sent to pharmacy 12/9/2020  Ashley please call patient and let him know his medication is sent in  Thank you Melly Gómez for the info, he actually will have 2 days left at our next appt  But being so close to the holiday I do not want any glitches    Thanks again!!

## 2021-01-14 ENCOUNTER — IMMUNIZATIONS (OUTPATIENT)
Dept: FAMILY MEDICINE CLINIC | Facility: HOSPITAL | Age: 31
End: 2021-01-14

## 2021-01-14 DIAGNOSIS — Z23 ENCOUNTER FOR IMMUNIZATION: Primary | ICD-10-CM

## 2021-01-14 PROCEDURE — 91301 SARS-COV-2 / COVID-19 MRNA VACCINE (MODERNA) 100 MCG: CPT

## 2021-01-14 PROCEDURE — 0011A SARS-COV-2 / COVID-19 MRNA VACCINE (MODERNA) 100 MCG: CPT

## 2021-01-20 ENCOUNTER — TELEPHONE (OUTPATIENT)
Dept: PSYCHIATRY | Facility: CLINIC | Age: 31
End: 2021-01-20

## 2021-02-09 ENCOUNTER — IMMUNIZATIONS (OUTPATIENT)
Dept: FAMILY MEDICINE CLINIC | Facility: HOSPITAL | Age: 31
End: 2021-02-09

## 2021-02-09 DIAGNOSIS — Z23 ENCOUNTER FOR IMMUNIZATION: Primary | ICD-10-CM

## 2021-02-09 PROCEDURE — 0012A SARS-COV-2 / COVID-19 MRNA VACCINE (MODERNA) 100 MCG: CPT

## 2021-02-09 PROCEDURE — 91301 SARS-COV-2 / COVID-19 MRNA VACCINE (MODERNA) 100 MCG: CPT

## 2021-03-02 ENCOUNTER — AMB VIDEO VISIT (OUTPATIENT)
Dept: URGENT CARE | Facility: CLINIC | Age: 31
End: 2021-03-02

## 2021-03-02 DIAGNOSIS — J06.9 ACUTE RESPIRATORY DISEASE: Primary | ICD-10-CM

## 2021-03-02 DIAGNOSIS — J06.9 ACUTE RESPIRATORY DISEASE: ICD-10-CM

## 2021-03-02 PROCEDURE — U0003 INFECTIOUS AGENT DETECTION BY NUCLEIC ACID (DNA OR RNA); SEVERE ACUTE RESPIRATORY SYNDROME CORONAVIRUS 2 (SARS-COV-2) (CORONAVIRUS DISEASE [COVID-19]), AMPLIFIED PROBE TECHNIQUE, MAKING USE OF HIGH THROUGHPUT TECHNOLOGIES AS DESCRIBED BY CMS-2020-01-R: HCPCS | Performed by: PHYSICIAN ASSISTANT

## 2021-03-02 PROCEDURE — U0005 INFEC AGEN DETEC AMPLI PROBE: HCPCS | Performed by: PHYSICIAN ASSISTANT

## 2021-03-02 NOTE — CARE ANYWHERE EVISITS
Visit Summary for [facility] - Gender: Male - Date of Birth: [id number]  Date: [id number] - Duration: 6 minutes  Patient: [facility]  Provider: Enrico Fitzgerald PA-C    Patient Contact Information  Address  [address]  [facility]  [phone]    Visit Topics  Cold [Added By: Self - 2021-03-02]    Triage Questions   What is your current physical address in the event of a medical emergency? Answer []  Are you allergic to any medications? Answer []  Are you now or could you be pregnant? Answer []  Do you have any immune system compromise or chronic lung   disease? Answer []  Do you have any vulnerable family members in the home (infant, pregnant, cancer, elderly)? Answer []     Conversation Transcripts  [0A][0A] [Notification] You are connected with Enrico Fitzgerald PA-C, Urgent Care Specialist [0A][Notification] Minnie Saxena is located in [place]  [0A][Notification] Minnie Saxena has shared health history  Byron Karanallieluh  [0A]    Diagnosis  Acute upper respiratory infection, unspecified    Procedures  Value: [id number] Code: CPT-4 UNLISTED E&M SERVICE    Medications Prescribed    No prescriptions ordered    Electronically signed by: Nely Enriquez(NPI [id number])

## 2021-03-02 NOTE — PATIENT INSTRUCTIONS
Proceed to [facility] or COVID testing tent for swab  Vitamin D3 2000 IU daily  Vitamin C 1g every 12 hours  Multivitamin daily  Fluids and rest  Over the counter cold medication as needed  Follow up with PCP if not improved, if symptoms are worse, go to the ER  [address]    Your healthcare provider and/or public health staff have evaluated you and have determined that you do not need to remain in the hospital at this time  At this time you can be isolated at home where you will be monitored by staff from your local or state health department  You should carefully follow the prevention and isolation steps below until a healthcare provider or local or state health department says that you can return to your normal activities  Stay home except to get medical care    People who are mildly ill with COVID-19 are able to isolate at home during their illness  You should restrict activities outside your home, except for getting medical care  Do not go to work, school, or public areas  Avoid using public transportation, ride-sharing, or taxis  Separate yourself from other people and animals in your home    People: As much as possible, you should stay in a specific room and away from other people in your home  Also, you should use a separate bathroom, if available  Animals: You should restrict contact with pets and other animals while you are sick with COVID-19, just like you would around other people  Although there have not been reports of pets or other animals becoming sick with COVID-19, it is still recommended that people sick with COVID-19 limit contact with animals until more information is known about the virus  When possible, have another member of your household care for your animals while you are sick  If you are sick with COVID-19, avoid contact with your pet, including petting, snuggling, being kissed or licked, and sharing food   If you must care for your pet or be around animals while you are sick, wash your hands before and after you interact with pets and wear a facemask  See COVID-19 and Animals for more information  Call ahead before visiting your doctor    If you have a medical appointment, call the healthcare provider and tell them that you have or may have COVID-19  This will help the healthcare providers office take steps to keep other people from getting infected or exposed  Wear a facemask    You should wear a facemask when you are around other people (e g , sharing a room or vehicle) or pets and before you enter a healthcare providers office  If you are not able to wear a facemask (for example, because it causes trouble breathing), then people who live with you should not stay in the same room with you, or they should wear a facemask if they enter your room  Cover your coughs and sneezes    Cover your mouth and nose with a tissue when you cough or sneeze  Throw used tissues in a lined trash can  Immediately wash your hands with soap and water for at least 20 seconds or, if soap and water are not available, clean your hands with an alcohol-based hand  that contains at least 60% alcohol  Clean your hands often    Wash your hands often with soap and water for at least 20 seconds, especially after blowing your nose, coughing, or sneezing; going to the bathroom; and before eating or preparing food  If soap and water are not readily available, use an alcohol-based hand  with at least 60% alcohol, covering all surfaces of your hands and rubbing them together until they feel dry  Soap and water are the best option if hands are visibly dirty  Avoid touching your eyes, nose, and mouth with unwashed hands  Avoid sharing personal household items    You should not share dishes, drinking glasses, cups, eating utensils, towels, or bedding with other people or pets in your home   After using these items, they should be washed thoroughly with soap and water  Clean all high-touch surfaces everyday    High touch surfaces include counters, tabletops, doorknobs, bathroom fixtures, toilets, phones, keyboards, tablets, and bedside tables  Also, clean any surfaces that may have blood, stool, or body fluids on them  Use a household cleaning spray or wipe, according to the label instructions  Labels contain instructions for safe and effective use of the cleaning product including precautions you should take when applying the product, such as wearing gloves and making sure you have good ventilation during use of the product  Monitor your symptoms    Seek prompt medical attention if your illness is worsening (e g , difficulty breathing)  Before seeking care, call your healthcare provider and tell them that you have, or are being evaluated for, COVID-19  Put on a facemask before you enter the facility  These steps will help the healthcare providers office to keep other people in the office or waiting room from getting infected or exposed  Ask your healthcare provider to call the local or [facility] health department  Persons who are placed under active monitoring or facilitated self-monitoring should follow instructions provided by their local health department or occupational health professionals, as appropriate  If you have a medical emergency and need to call 911, notify the dispatch personnel that you have, or are being evaluated for COVID-19  If possible, put on a facemask before emergency medical services arrive      Discontinuing home isolation    Patients with confirmed COVID-19 should remain under home isolation precautions until the following conditions are met:   - They have had no fever for at least 24 hours (that is one full day of no fever without the use medicine that reduces fevers)  AND  - other symptoms have improved (for example, when their cough or shortness of breath have improved)  AND  - If had mild or moderate illness, at least 10 days have passed since their symptoms first appeared or if severe illness (needed oxygen) or immunosuppressed, at least 20 days have passed since symptoms first appeared  Patients with confirmed COVID-19 should also notify close contacts (including their workplace) and ask that they self-quarantine  Currently, close contact is defined as being within 6 feet for 15 minutes or more from the period 24 hours starting 48 hours before symptom onset to the time at which the patient went into isolation  Close contacts of patients diagnosed with COVID-19 should be instructed by the patient to self-quarantine for 14 days from the last time of their last contact with the patient       Source: [employer]

## 2021-03-02 NOTE — PROGRESS NOTES
Video Visit - Fede Gregory 31 y o  male MRN: [id number]    REQUIRED DOCUMENTATION:         1  This service was provided via [employer]  2  Provider located at [address] [place] [address]  [phone]  3  [facility] provider: Dolores Flores PA-C   4  Identify all parties in room with patient during [facility] visit:  Yes  5  After connecting through [email or web address], patient was identified by name and date of birth  Patient was then informed that this was a Telemedicine visit and that the exam was being conducted confidentially over secure lines  My office door was closed  No one else was in the room  Patient acknowledged consent and understanding of privacy and security of the Telemedicine visit  I informed the patient that I have reviewed their record in Epic and presented the opportunity for them to ask any questions regarding the visit today  The patient agreed to participate  Reports sneezing, cough, rhinorrhea, congestion, diarrhea and improving sore throat x Sunday  He has been taking Nyquil, DayQuil and cough drops  Denies other URI sx or SOB  PMH of asthma  Patient had second COVID vaccine on 2/9/2021  Denies known COVID exposure  Physical Exam  There are no diagnoses linked to this encounter  There are no Patient Instructions on file for this visit  Proceed to [facility] or COVID testing tent for swab  Vitamin D3 2000 IU daily  Vitamin C 1g every 12 hours  Multivitamin daily  Fluids and rest  Over the counter cold medication as needed  Follow up with PCP if not improved, if symptoms are worse, go to the ER

## 2021-03-02 NOTE — LETTER
March 2, 2021       Patient: Garrick Street   YOB: 1990   Date of Visit: 3/2/2021        To Whom It May Concern,     Garrick Street was seen in our urgent care department on 3/2/2021  Please excuse him from work until cleared by a healthcare provider  If you have any questions or concerns, please don't hesitate to call             Sincerely,        Scott Abreu PA-C      CC: [unfilled]    Enclosure

## 2021-03-03 LAB — SARS-COV-2 RNA RESP QL NAA+PROBE: NEGATIVE

## 2021-03-15 ENCOUNTER — DOCUMENTATION (OUTPATIENT)
Dept: PSYCHIATRY | Facility: CLINIC | Age: 31
End: 2021-03-15

## 2021-03-15 NOTE — PROGRESS NOTES
Treatment Plan not completed within required time limits due to:  Abisai Johnston  no show appointment 02/25/21

## 2021-03-16 DIAGNOSIS — F33.1 MODERATE RECURRENT MAJOR DEPRESSION (HCC): ICD-10-CM

## 2021-03-16 DIAGNOSIS — F41.0 PANIC ATTACKS: ICD-10-CM

## 2021-03-16 DIAGNOSIS — F41.1 GENERALIZED ANXIETY DISORDER: ICD-10-CM

## 2021-03-16 RX ORDER — FLUOXETINE HYDROCHLORIDE 20 MG/1
20 CAPSULE ORAL DAILY
Qty: 90 CAPSULE | Refills: 0 | Status: SHIPPED | OUTPATIENT
Start: 2021-03-16 | End: 2021-04-19 | Stop reason: SDUPTHER

## 2021-03-16 RX ORDER — ALPRAZOLAM 0.5 MG/1
TABLET ORAL
Qty: 30 TABLET | Refills: 0 | Status: SHIPPED | OUTPATIENT
Start: 2021-03-16 | End: 2022-03-11 | Stop reason: SDUPTHER

## 2021-03-16 NOTE — TELEPHONE ENCOUNTER
Will ask covering provider to review in [facility]'s absence  Next appointment 5/19/21
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
## 2021-04-19 ENCOUNTER — TELEMEDICINE (OUTPATIENT)
Dept: PSYCHIATRY | Facility: CLINIC | Age: 31
End: 2021-04-19
Payer: COMMERCIAL

## 2021-04-19 VITALS — BODY MASS INDEX: 30.75 KG/M2 | WEIGHT: 227 LBS | HEIGHT: 72 IN

## 2021-04-19 DIAGNOSIS — F41.1 GENERALIZED ANXIETY DISORDER: ICD-10-CM

## 2021-04-19 DIAGNOSIS — F33.1 MODERATE RECURRENT MAJOR DEPRESSION (HCC): ICD-10-CM

## 2021-04-19 DIAGNOSIS — F41.0 PANIC ATTACKS: ICD-10-CM

## 2021-04-19 PROCEDURE — 90833 PSYTX W PT W E/M 30 MIN: CPT | Performed by: NURSE PRACTITIONER

## 2021-04-19 PROCEDURE — 99214 OFFICE O/P EST MOD 30 MIN: CPT | Performed by: NURSE PRACTITIONER

## 2021-04-19 RX ORDER — FLUOXETINE HYDROCHLORIDE 20 MG/1
20 CAPSULE ORAL DAILY
Qty: 90 CAPSULE | Refills: 0 | Status: SHIPPED | OUTPATIENT
Start: 2021-05-19 | End: 2021-07-15 | Stop reason: SDUPTHER

## 2021-04-19 NOTE — BH TREATMENT PLAN
TREATMENT PLAN (Medication Management Only)        Morteza Bassett    Name and Date of Birth: Viktoria Orellana 31 y o  1990  Date of Treatment Plan: April 19, 2021  Diagnosis/Diagnoses:  No diagnosis found  Strengths/Personal Resources for Self-Care: "I am real and honest/up front, I care a lot but I don't overly care that it hurts me so I learned to understand my limits"  Area/Areas of need (in own words): "I still need to work on my frustration when I feel like I can do something better, I still get upset aobut things"  1  Long Term Goal: Maintain control of irritability  Target Date:6 months - 10/19/2021  Person/Persons responsible for completion of goal: Breanna Spain  2  Short Term Objective (s) - How will we reach this goal?:   A  Provider new recommended medication/dosage changes and/or continue medication(s): continue current medications as prescribed Prozac, Xanax  B  stop and think about things before I speak  C  exercise 4 x per week for 15-30 minutes  Target Date:6 months - 10/19/2021  Person/Persons Responsible for Completion of Goal: Breanna Spain  Progress Towards Goals: continuing treatment  Treatment Modality: medication management every 3 months  Review due 180 days from date of this plan: 6 months - 10/19/2021  Expected length of service: ongoing treatment  My Physician/PA/NP and I have developed this plan together and I agree to work on the goals and objectives  I understand the treatment goals that were developed for my treatment      Treatment Plan done but not signed at time of office visit due to:  Plan reviewed by phone or in person  and verbal consent given due to Tanmay social brea

## 2021-07-07 ENCOUNTER — TELEMEDICINE (OUTPATIENT)
Dept: PSYCHIATRY | Facility: CLINIC | Age: 31
End: 2021-07-07
Payer: COMMERCIAL

## 2021-07-07 VITALS — WEIGHT: 230 LBS | HEIGHT: 72 IN | BODY MASS INDEX: 31.15 KG/M2

## 2021-07-07 DIAGNOSIS — F41.1 GENERALIZED ANXIETY DISORDER: ICD-10-CM

## 2021-07-07 DIAGNOSIS — F33.1 MODERATE RECURRENT MAJOR DEPRESSION (HCC): Primary | ICD-10-CM

## 2021-07-07 DIAGNOSIS — G47.00 INSOMNIA, UNSPECIFIED TYPE: ICD-10-CM

## 2021-07-07 DIAGNOSIS — F41.0 PANIC ATTACKS: ICD-10-CM

## 2021-07-07 PROCEDURE — 90833 PSYTX W PT W E/M 30 MIN: CPT | Performed by: NURSE PRACTITIONER

## 2021-07-07 PROCEDURE — 99214 OFFICE O/P EST MOD 30 MIN: CPT | Performed by: NURSE PRACTITIONER

## 2021-07-07 RX ORDER — FLUOXETINE HYDROCHLORIDE 20 MG/1
20 CAPSULE ORAL DAILY
Qty: 90 CAPSULE | Refills: 0 | Status: SHIPPED | OUTPATIENT
Start: 2021-07-07 | End: 2021-11-08 | Stop reason: SDUPTHER
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
## 2021-07-09 ENCOUNTER — DOCUMENTATION (OUTPATIENT)
Dept: BEHAVIORAL/MENTAL HEALTH CLINIC | Facility: CLINIC | Age: 31
End: 2021-07-09

## 2021-07-26 ENCOUNTER — SOCIAL WORK (OUTPATIENT)
Dept: BEHAVIORAL/MENTAL HEALTH CLINIC | Facility: CLINIC | Age: 31
End: 2021-07-26
Payer: COMMERCIAL

## 2021-07-26 DIAGNOSIS — F41.1 GENERALIZED ANXIETY DISORDER: ICD-10-CM

## 2021-07-26 DIAGNOSIS — F33.1 MODERATE RECURRENT MAJOR DEPRESSION (HCC): ICD-10-CM

## 2021-07-26 PROCEDURE — 90834 PSYTX W PT 45 MINUTES: CPT | Performed by: SOCIAL WORKER

## 2021-07-26 NOTE — BH TREATMENT PLAN
Lani Renee  1990         Date of Initial Treatment Plan: 6/21/18  Date of Current Treatment Plan: 7/26/21     Treatment Plan Number 7     Strengths/Personal Resources for Self Care: I care A LOT, I know I have potential, I am productive at task completion, I pay attention to getting things done       Diagnosis: Anxiety, Depression     Area of Needs: I feel like I am in this shit all by myself       Long Term Goal 1: AI want to get back "on track "    Target Date:1/26/22  Completion Date: na         Short Term Objectives for Goal 1: AI will talk in therapy about "being there" for myself  I will complete the 75 hard challenge  I will talk to Gray Mosqueda about attending therapy with me         GOAL 1: Modality: Individual 2x per month   Completion Date na and The person(s) responsible for carrying out the plan is  Jade Rowley [address] Treatment Plan [facility]: Diagnosis and Treatment Plan explained to Ghanshyam Kumar relates understanding diagnosis and is agreeable to Treatment Plan          Client Comments : Please share your thoughts, feelings, need and/or experiences regarding your treatment plan:    __________________________________________________________________    Treatment Plan reviewed but not signed due to COVID restrictions

## 2021-07-27 NOTE — PSYCH
Treatment Plan Tracking    # 2Treatment Plan not completed within required time limits due to: Ryan Morales has not been seen in past 90 days  Michael Hampton

## 2021-07-27 NOTE — PSYCH
Goals addressed in session: Goal 1 and Goal 2      Pain:       none     0     Current suicide risk : Low      D:Amrik spoke today about his feelings about how his interactions with his paramour and hunting friend have been over the past month  He also shared concerns re: his father, sister, nephew      A: Lewis Leonardo relieved to be back in therapy and interested in asking his paramour to meet conjointly with this worker and himself in order to address their relationship struggles  His Treatment Plan was updated today      P:   Upcoming sessions will be used to continue to support him with the above        This session lasted for 50 minutes        This note was not shared with the patient due to this is a psychotherapy note        Encounter Diagnoses   Name Primary?     Moderate recurrent major depression (HCC)     Generalized anxiety disorder

## 2021-08-19 ENCOUNTER — TELEMEDICINE (OUTPATIENT)
Dept: PSYCHIATRY | Facility: CLINIC | Age: 31
End: 2021-08-19
Payer: COMMERCIAL

## 2021-08-19 VITALS — HEIGHT: 72 IN | BODY MASS INDEX: 30.12 KG/M2 | WEIGHT: 222.4 LBS

## 2021-08-19 DIAGNOSIS — F41.0 PANIC ATTACKS: ICD-10-CM

## 2021-08-19 DIAGNOSIS — F41.1 GENERALIZED ANXIETY DISORDER: ICD-10-CM

## 2021-08-19 DIAGNOSIS — G47.00 INSOMNIA, UNSPECIFIED TYPE: ICD-10-CM

## 2021-08-19 DIAGNOSIS — F33.1 MODERATE RECURRENT MAJOR DEPRESSION (HCC): Primary | ICD-10-CM

## 2021-08-19 PROCEDURE — 99213 OFFICE O/P EST LOW 20 MIN: CPT | Performed by: NURSE PRACTITIONER
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
## 2021-08-25 ENCOUNTER — SOCIAL WORK (OUTPATIENT)
Dept: BEHAVIORAL/MENTAL HEALTH CLINIC | Facility: CLINIC | Age: 31
End: 2021-08-25
Payer: COMMERCIAL

## 2021-08-25 DIAGNOSIS — F33.1 MODERATE RECURRENT MAJOR DEPRESSION (HCC): ICD-10-CM

## 2021-08-25 DIAGNOSIS — F41.0 PANIC ATTACKS: ICD-10-CM

## 2021-08-25 DIAGNOSIS — F41.1 GENERALIZED ANXIETY DISORDER: ICD-10-CM

## 2021-08-25 PROCEDURE — 90834 PSYTX W PT 45 MINUTES: CPT | Performed by: SOCIAL WORKER

## 2021-08-25 NOTE — PSYCH
Goals addressed in session: Goal 1 and Goal 2      Pain:       none     0     Current suicide risk : Low      D:Amrik spoke today about his feelings about how his first 20 days of his 76 day challenge has positively impacted his mental functioning  He shared details of the changes he has made to his daily routine as well as to his thought processes        A:  Amrik appears to  Be  Proud of his progress while also somewhat conflicted about changes with his relationships with his close friends  His paramour to meet conjointly with this worker and himself in order to address their relationship struggles  P:   Upcoming sessions will be used to continue to support him with the above        This session lasted for 50 minutes        This note was not shared with the patient due to this is a psychotherapy note        Encounter Diagnoses   Name Primary?     Generalized anxiety disorder     Moderate recurrent major depression (HCC)     Panic attacks

## 2021-09-15 ENCOUNTER — AMB VIDEO VISIT (OUTPATIENT)
Dept: OTHER | Facility: HOSPITAL | Age: 31
End: 2021-09-15
Payer: COMMERCIAL

## 2021-09-15 ENCOUNTER — TELEPHONE (OUTPATIENT)
Dept: FAMILY MEDICINE CLINIC | Facility: CLINIC | Age: 31
End: 2021-09-15

## 2021-09-15 PROCEDURE — U0003 INFECTIOUS AGENT DETECTION BY NUCLEIC ACID (DNA OR RNA); SEVERE ACUTE RESPIRATORY SYNDROME CORONAVIRUS 2 (SARS-COV-2) (CORONAVIRUS DISEASE [COVID-19]), AMPLIFIED PROBE TECHNIQUE, MAKING USE OF HIGH THROUGHPUT TECHNOLOGIES AS DESCRIBED BY CMS-2020-01-R: HCPCS | Performed by: FAMILY MEDICINE

## 2021-09-15 PROCEDURE — ECARE PR SL URGENT CARE VIRTUAL VISIT: Performed by: FAMILY MEDICINE

## 2021-09-15 PROCEDURE — U0005 INFEC AGEN DETEC AMPLI PROBE: HCPCS | Performed by: FAMILY MEDICINE

## 2021-09-15 NOTE — TELEPHONE ENCOUNTER
Patient called and stated that he did a virtual apt through the my chart yenifer has he had covid symptoms he stated that the Doctor told him to get a covid test done but did not order one for him, he told him to go to the pharmacy website and that he could request one there, but patient stated that there is nothing there  Patient wanted to know if you can order the test for him         Symptoms: cough, acheyness, lost of smell or taste, runny nose, sneezing headaches
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
## 2021-09-17 ENCOUNTER — TELEMEDICINE (OUTPATIENT)
Dept: BEHAVIORAL/MENTAL HEALTH CLINIC | Facility: CLINIC | Age: 31
End: 2021-09-17
Payer: COMMERCIAL

## 2021-09-17 ENCOUNTER — TELEMEDICINE (OUTPATIENT)
Dept: FAMILY MEDICINE CLINIC | Facility: CLINIC | Age: 31
End: 2021-09-17
Payer: COMMERCIAL

## 2021-09-17 DIAGNOSIS — U07.1 COVID-19: Primary | ICD-10-CM

## 2021-09-17 DIAGNOSIS — F33.1 MODERATE RECURRENT MAJOR DEPRESSION (HCC): ICD-10-CM

## 2021-09-17 DIAGNOSIS — F41.1 GENERALIZED ANXIETY DISORDER: ICD-10-CM

## 2021-09-17 PROCEDURE — 90834 PSYTX W PT 45 MINUTES: CPT | Performed by: SOCIAL WORKER

## 2021-09-17 PROCEDURE — 99213 OFFICE O/P EST LOW 20 MIN: CPT | Performed by: NURSE PRACTITIONER
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
## 2021-10-15 ENCOUNTER — SOCIAL WORK (OUTPATIENT)
Dept: BEHAVIORAL/MENTAL HEALTH CLINIC | Facility: CLINIC | Age: 31
End: 2021-10-15
Payer: COMMERCIAL

## 2021-10-15 DIAGNOSIS — F41.0 PANIC ATTACKS: ICD-10-CM

## 2021-10-15 DIAGNOSIS — F33.1 MODERATE RECURRENT MAJOR DEPRESSION (HCC): ICD-10-CM

## 2021-10-15 DIAGNOSIS — F41.1 GENERALIZED ANXIETY DISORDER: ICD-10-CM

## 2021-10-15 PROCEDURE — 90834 PSYTX W PT 45 MINUTES: CPT | Performed by: SOCIAL WORKER

## 2021-11-08 ENCOUNTER — OFFICE VISIT (OUTPATIENT)
Dept: PSYCHIATRY | Facility: CLINIC | Age: 31
End: 2021-11-08
Payer: COMMERCIAL

## 2021-11-08 VITALS — BODY MASS INDEX: 29.22 KG/M2 | WEIGHT: 215.7 LBS | HEIGHT: 72 IN

## 2021-11-08 DIAGNOSIS — G47.00 INSOMNIA, UNSPECIFIED TYPE: ICD-10-CM

## 2021-11-08 DIAGNOSIS — F90.0 ATTENTION DEFICIT HYPERACTIVITY DISORDER (ADHD), PREDOMINANTLY INATTENTIVE TYPE: ICD-10-CM

## 2021-11-08 DIAGNOSIS — F41.0 PANIC ATTACKS: ICD-10-CM

## 2021-11-08 DIAGNOSIS — F41.1 GENERALIZED ANXIETY DISORDER: ICD-10-CM

## 2021-11-08 DIAGNOSIS — F33.1 MODERATE RECURRENT MAJOR DEPRESSION (HCC): Primary | ICD-10-CM

## 2021-11-08 PROCEDURE — 99213 OFFICE O/P EST LOW 20 MIN: CPT | Performed by: NURSE PRACTITIONER

## 2021-11-08 RX ORDER — FLUOXETINE HYDROCHLORIDE 20 MG/1
20 CAPSULE ORAL DAILY
Qty: 90 CAPSULE | Refills: 0 | Status: SHIPPED | OUTPATIENT
Start: 2021-11-08 | End: 2022-03-11 | Stop reason: SDUPTHER

## 2021-11-12 ENCOUNTER — TELEPHONE (OUTPATIENT)
Dept: PSYCHIATRY | Facility: CLINIC | Age: 31
End: 2021-11-12

## 2021-12-21 ENCOUNTER — SOCIAL WORK (OUTPATIENT)
Dept: BEHAVIORAL/MENTAL HEALTH CLINIC | Facility: CLINIC | Age: 31
End: 2021-12-21
Payer: COMMERCIAL

## 2021-12-21 DIAGNOSIS — F33.1 MODERATE RECURRENT MAJOR DEPRESSION (HCC): ICD-10-CM

## 2021-12-21 DIAGNOSIS — F41.1 GENERALIZED ANXIETY DISORDER: ICD-10-CM

## 2021-12-21 DIAGNOSIS — F41.0 PANIC ATTACKS: ICD-10-CM

## 2021-12-21 DIAGNOSIS — F90.0 ATTENTION DEFICIT HYPERACTIVITY DISORDER (ADHD), PREDOMINANTLY INATTENTIVE TYPE: ICD-10-CM

## 2021-12-21 PROCEDURE — 90834 PSYTX W PT 45 MINUTES: CPT | Performed by: SOCIAL WORKER

## 2022-01-31 ENCOUNTER — TELEMEDICINE (OUTPATIENT)
Dept: BEHAVIORAL/MENTAL HEALTH CLINIC | Facility: CLINIC | Age: 32
End: 2022-01-31
Payer: COMMERCIAL

## 2022-01-31 DIAGNOSIS — F90.0 ATTENTION DEFICIT HYPERACTIVITY DISORDER (ADHD), PREDOMINANTLY INATTENTIVE TYPE: ICD-10-CM

## 2022-01-31 DIAGNOSIS — F33.1 MODERATE RECURRENT MAJOR DEPRESSION (HCC): ICD-10-CM

## 2022-01-31 DIAGNOSIS — F41.1 GENERALIZED ANXIETY DISORDER: ICD-10-CM

## 2022-01-31 PROCEDURE — 90834 PSYTX W PT 45 MINUTES: CPT | Performed by: SOCIAL WORKER

## 2022-01-31 NOTE — BH TREATMENT PLAN
Vasugerald Olvin  1990         Date of Initial Treatment Plan: 6/21/18  Date of Current Treatment Plan: 1/31/22     Treatment Plan Number 8     Strengths/Personal Resources for Self Care: I care A LOT, I know I have potential, I am productive at task completion, I pay attention to getting things done       Diagnosis: Anxiety, Depression     Area of Needs: I am trying to get mentally and physically healthy       Long Term Goal 1: AI want to make things better  Target Date:8/1/22  Completion Date: na         Short Term Objectives for Goal 1: AI will put my time/ effort into people that put their time /effort into me  I will complete the 75 hard challenge again as I like the headspace I achieve while I do this     I will talk to Morteza [place] about attending couples therapy         GOAL 1: Modality: Individual 2x per month   Completion Date na and The person(s) responsible for carrying out the plan is  Jade Rowley [address] Treatment Plan [facility]: Diagnosis and Treatment Plan explained to Mirela Dawkins relates understanding diagnosis and is agreeable to Treatment Plan          Client Comments : Please share your thoughts, feelings, need and/or experiences regarding your treatment plan:    __________________________________________________________________    Treatment Plan reviewed but not signed due to COVID restrictions
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
## 2022-01-31 NOTE — PSYCH
Treatment Plan Tracking    # 1Treatment Plan not completed within required time limits due to: Client was not seen in past 30 days  Dara Soulier

## 2022-03-07 ENCOUNTER — SOCIAL WORK (OUTPATIENT)
Dept: BEHAVIORAL/MENTAL HEALTH CLINIC | Facility: CLINIC | Age: 32
End: 2022-03-07
Payer: COMMERCIAL

## 2022-03-07 DIAGNOSIS — F41.1 GENERALIZED ANXIETY DISORDER: ICD-10-CM

## 2022-03-07 DIAGNOSIS — F33.1 MODERATE RECURRENT MAJOR DEPRESSION (HCC): ICD-10-CM

## 2022-03-07 DIAGNOSIS — F41.0 PANIC ATTACKS: ICD-10-CM

## 2022-03-07 DIAGNOSIS — F90.0 ATTENTION DEFICIT HYPERACTIVITY DISORDER (ADHD), PREDOMINANTLY INATTENTIVE TYPE: ICD-10-CM

## 2022-03-07 PROCEDURE — 90834 PSYTX W PT 45 MINUTES: CPT | Performed by: SOCIAL WORKER
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
## 2022-03-11 DIAGNOSIS — F41.1 GENERALIZED ANXIETY DISORDER: ICD-10-CM

## 2022-03-11 DIAGNOSIS — F33.1 MODERATE RECURRENT MAJOR DEPRESSION (HCC): Primary | ICD-10-CM

## 2022-03-11 DIAGNOSIS — F41.0 PANIC ATTACKS: ICD-10-CM

## 2022-03-11 RX ORDER — FLUOXETINE HYDROCHLORIDE 20 MG/1
20 CAPSULE ORAL DAILY
Qty: 90 CAPSULE | Refills: 0 | Status: SHIPPED | OUTPATIENT
Start: 2022-03-11 | End: 2022-03-29 | Stop reason: SDUPTHER

## 2022-03-11 RX ORDER — ALPRAZOLAM 0.5 MG/1
TABLET ORAL
Qty: 30 TABLET | Refills: 0 | Status: SHIPPED | OUTPATIENT
Start: 2022-03-11

## 2022-03-11 NOTE — TELEPHONE ENCOUNTER
Will ask covering provider to review in [facility]  [address] ' and Dr Axel Farooq  absence   Next appointment 3/29/22

## 2022-03-29 ENCOUNTER — OFFICE VISIT (OUTPATIENT)
Dept: PSYCHIATRY | Facility: CLINIC | Age: 32
End: 2022-03-29
Payer: COMMERCIAL

## 2022-03-29 DIAGNOSIS — F41.1 GENERALIZED ANXIETY DISORDER: ICD-10-CM

## 2022-03-29 DIAGNOSIS — F33.1 MODERATE RECURRENT MAJOR DEPRESSION (HCC): Primary | ICD-10-CM

## 2022-03-29 DIAGNOSIS — F41.0 PANIC ATTACKS: ICD-10-CM

## 2022-03-29 DIAGNOSIS — F90.0 ATTENTION DEFICIT HYPERACTIVITY DISORDER (ADHD), PREDOMINANTLY INATTENTIVE TYPE: ICD-10-CM

## 2022-03-29 PROCEDURE — 90792 PSYCH DIAG EVAL W/MED SRVCS: CPT | Performed by: STUDENT IN AN ORGANIZED HEALTH CARE EDUCATION/TRAINING PROGRAM

## 2022-03-29 RX ORDER — FLUOXETINE HYDROCHLORIDE 20 MG/1
20 CAPSULE ORAL DAILY
Qty: 90 CAPSULE | Refills: 1 | Status: SHIPPED | OUTPATIENT
Start: 2022-03-29 | End: 2022-06-14 | Stop reason: SDUPTHER
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
## 2022-04-06 ENCOUNTER — AMB VIDEO VISIT (OUTPATIENT)
Dept: OTHER | Facility: HOSPITAL | Age: 32
End: 2022-04-06
Payer: COMMERCIAL

## 2022-04-06 DIAGNOSIS — K21.9 GASTROESOPHAGEAL REFLUX DISEASE WITHOUT ESOPHAGITIS: ICD-10-CM

## 2022-04-06 DIAGNOSIS — J45.909 MILD ASTHMA, UNSPECIFIED WHETHER COMPLICATED, UNSPECIFIED WHETHER PERSISTENT: Primary | ICD-10-CM

## 2022-04-06 PROCEDURE — 99212 OFFICE O/P EST SF 10 MIN: CPT | Performed by: PHYSICIAN ASSISTANT

## 2022-04-06 RX ORDER — METHYLPREDNISOLONE 4 MG/1
TABLET ORAL
Qty: 21 EACH | Refills: 0 | Status: SHIPPED | OUTPATIENT
Start: 2022-04-06 | End: 2022-06-02

## 2022-04-06 RX ORDER — ALBUTEROL SULFATE 90 UG/1
2 AEROSOL, METERED RESPIRATORY (INHALATION) EVERY 6 HOURS PRN
Qty: 6.7 G | Refills: 0 | Status: SHIPPED | OUTPATIENT
Start: 2022-04-06

## 2022-04-06 RX ORDER — OMEPRAZOLE 40 MG/1
40 CAPSULE, DELAYED RELEASE ORAL 2 TIMES DAILY
Qty: 180 CAPSULE | Refills: 0 | Status: SHIPPED | OUTPATIENT
Start: 2022-04-06

## 2022-04-06 NOTE — CARE ANYWHERE EVISITS
Visit Summary for Saeed Jack [facility] - Gender: Male - Date of Birth: [id number]  Date: [id number] - Duration: 7 minutes  Patient: Saeed Jack [facility]  Provider: Mckenna Martinez PA-C    Patient Contact Information  Address  [address]; Formerly named [facility] [place]   [phone]    Visit Topics    Triage Questions   What is your current physical address in the event of a medical emergency? Answer []  Are you allergic to any medications? Answer []  Are you now or could you be pregnant? Answer []  Do you have any immune system compromise or chronic lung   disease? Answer []  Do you have any vulnerable family members in the home (infant, pregnant, cancer, elderly)? Answer []     Conversation Transcripts  [0A][0A] [Notification] You are connected with Mckenna Martinez PA-C, Urgent Care Specialist [0A][Notification] Ministerio Hudson is located in [address] [place] [address]  [0A][Notification] Ministerio Hudson has shared health history  Jones Malone  [0A]    Diagnosis  Unspecified asthma, uncomplicated    Procedures  Value: 13118 Code: CPT-4 UNLISTED E&M SERVICE    Medications Prescribed    No prescriptions ordered    Electronically signed by: Noni Wallace(NPI [id number])

## 2022-04-06 NOTE — PROGRESS NOTES
Video Visit - [facility] 27 y o  male MRN: [id number]    REQUIRED DOCUMENTATION:         1  This service was provided via [employer]  2  Provider located at [address] [place] [id number]  3  [facility] provider: Mercy Kelly PA-C   4  Identify all parties in [facility]:  No one else  5  After connecting through [employer], patient was identified by name and date of birth  Patient was then informed that this was a Telemedicine visit and that the exam was being conducted confidentially over secure lines  My office door was closed  No one else was in the room  Patient acknowledged consent and understanding of privacy and security of the Telemedicine visit  I informed the patient that I have reviewed their record in Epic and presented the opportunity for them to ask any questions regarding the visit today  The patient agreed to participate  HPI  Patient presents with chest tightness, coughing  He has some sinus congestion as well  He thinks it is mainly his allergies  He did stop his Singulair recently due to running out of the prescription and that's when his symptoms started  He is still walking several miles a day, biking and working out without issues  He has been using his inhaler  He just feels like his chest is tight but denies any wheezing, CP, SOB, fevers, chills, nausea  Physical Exam  Constitutional:       General: He is not in acute distress  Appearance: Normal appearance  He is not ill-appearing, toxic-appearing or diaphoretic  HENT:      Head: Normocephalic and atraumatic  Pulmonary:      Effort: Pulmonary effort is normal  No respiratory distress  Comments: Talking in full sentences, no accessory muscle use  Appears comfortable  Skin:     General: Skin is dry  Neurological:      General: No focal deficit present  Mental Status: He is alert and oriented to person, place, and time     Psychiatric:         Mood and Affect: Mood normal          Behavior: Behavior normal          Diagnoses and all orders for this visit:    Mild asthma, unspecified whether complicated, unspecified whether persistent  -     albuterol (Proventil HFA) 90 mcg/act inhaler; Inhale 2 puffs every 6 (six) hours as needed for wheezing  -     methylPREDNISolone 4 MG tablet therapy pack; Use as directed on package    will treat with steroids and inhaler  He should monitor symptoms and if no improvement or worsening go to UC or ER  He understands and agrees  He denied a chest x-ray at this time  Declined covid/flu testing   Patient Instructions     Asthma   WHAT YOU NEED TO KNOW:   Asthma is a lung disease that makes breathing difficult  Chronic inflammation and reactions to triggers narrow the airways in the lungs  Asthma can become life-threatening if it is not managed  DISCHARGE INSTRUCTIONS:   Call your local emergency number (911 in the [address] [facility]) if:   · You have severe shortness of breath  · The skin around your neck and ribs pulls in with each breath  · Your peak flow numbers are in the red zone of your AAP  Return to the emergency department if:   · You have shortness of breath, even after you take your short-term medicine as directed  · Your lips or nails turn blue or gray  Call your doctor or asthma specialist if:   · You run out of medicine before your next refill is due  · Your symptoms get worse  · You need to take more medicine than usual to control your symptoms  · You have questions or concerns about your condition or care  Medicines:   · Medicines  may be used to decrease inflammation, open airways, and make it easier to breathe  Medicines may be inhaled, taken as a pill, or injected  Short-term medicines relieve your symptoms quickly  Long-term medicines are used to prevent future asthma attacks  Other medicines may be needed if your regular medicines are not able to prevent attacks   You may also need medicine to help control your allergies  Ask your healthcare provider for more information about any medicine you are given and how to take it safely  · Take your medicine as directed  Contact your healthcare provider if you think your medicine is not helping or if you have side effects  Tell him of her if you are allergic to any medicine  Keep a list of the medicines, vitamins, and herbs you take  Include the amounts, and when and why you take them  Bring the list or the pill bottles to follow-up visits  Carry your medicine list with you in case of an emergency  Manage your symptoms and prevent future attacks:   · Follow your Asthma Action Plan (AAP)  This is a written plan that you and your healthcare provider create  It explains which medicine you need and when to change doses if necessary  It also explains how you can monitor symptoms and use a peak flow meter  The meter measures how well your lungs are working  · Manage other health conditions , such as allergies, acid reflux, and sleep apnea  · Identify and avoid triggers  These may include pets, dust mites, mold, and cockroaches  · Do not smoke or be around others who smoke  Nicotine and other chemicals in cigarettes and cigars can cause lung damage  Ask your healthcare provider for information if you currently smoke and need help to quit  E-cigarettes or smokeless tobacco still contain nicotine  Talk to your healthcare provider before you use these products  · Ask about the flu vaccine  The flu can make your asthma worse  You may need a yearly flu shot  Follow up with your healthcare provider as directed: You will need to return to make sure your medicine is working and your symptoms are controlled  You may be referred to an asthma or allergy specialist  Christiano Aden may be asked to keep a record of your peak flow values and bring it with you to your appointments  Write down your questions so you remember to ask them during your visits    © Copyright [employer] 2022 Information is for End User's use only and may not be sold, redistributed or otherwise used for commercial purposes  All illustrations and images included in CareNotes® are the copyrighted property of A D A M [employer] Inc  or Cristela Rivas  The above information is an  only  It is not intended as medical advice for individual conditions or treatments  Talk to your doctor, nurse or pharmacist before following any medical regimen to see if it is safe and effective for you

## 2022-04-06 NOTE — PATIENT INSTRUCTIONS
Asthma   WHAT YOU NEED TO KNOW:   Asthma is a lung disease that makes breathing difficult  Chronic inflammation and reactions to triggers narrow the airways in the lungs  Asthma can become life-threatening if it is not managed  DISCHARGE INSTRUCTIONS:   Call your local emergency number (911 in the [address]) if:   · You have severe shortness of breath  · The skin around your neck and ribs pulls in with each breath  · Your peak flow numbers are in the red zone of your AAP  Return to the emergency department if:   · You have shortness of breath, even after you take your short-term medicine as directed  · Your lips or nails turn blue or gray  Call your doctor or asthma specialist if:   · You run out of medicine before your next refill is due  · Your symptoms get worse  · You need to take more medicine than usual to control your symptoms  · You have questions or concerns about your condition or care  Medicines:   · Medicines  may be used to decrease inflammation, open airways, and make it easier to breathe  Medicines may be inhaled, taken as a pill, or injected  Short-term medicines relieve your symptoms quickly  Long-term medicines are used to prevent future asthma attacks  Other medicines may be needed if your regular medicines are not able to prevent attacks  You may also need medicine to help control your allergies  Ask your healthcare provider for more information about any medicine you are given and how to take it safely  · Take your medicine as directed  Contact your healthcare provider if you think your medicine is not helping or if you have side effects  Tell him of her if you are allergic to any medicine  Keep a list of the medicines, vitamins, and herbs you take  Include the amounts, and when and why you take them  Bring the list or the pill bottles to follow-up visits  Carry your medicine list with you in case of an emergency      Manage your symptoms and prevent future attacks: · Follow your Asthma Action Plan (AAP)  This is a written plan that you and your healthcare provider create  It explains which medicine you need and when to change doses if necessary  It also explains how you can monitor symptoms and use a peak flow meter  The meter measures how well your lungs are working  · Manage other health conditions , such as allergies, acid reflux, and sleep apnea  · Identify and avoid triggers  These may include pets, dust mites, mold, and cockroaches  · Do not smoke or be around others who smoke  Nicotine and other chemicals in cigarettes and cigars can cause lung damage  Ask your healthcare provider for information if you currently smoke and need help to quit  E-cigarettes or smokeless tobacco still contain nicotine  Talk to your healthcare provider before you use these products  · Ask about the flu vaccine  The flu can make your asthma worse  You may need a yearly flu shot  Follow up with your healthcare provider as directed: You will need to return to make sure your medicine is working and your symptoms are controlled  You may be referred to an asthma or allergy specialist  Huey Roldan may be asked to keep a record of your peak flow values and bring it with you to your appointments  Write down your questions so you remember to ask them during your visits  © Copyright [employer] 2022 Information is for End User's use only and may not be sold, redistributed or otherwise used for commercial purposes  All illustrations and images included in CareNotes® are the copyrighted property of [employer]  or Cristela Gregory   The above information is an  only  It is not intended as medical advice for individual conditions or treatments  Talk to your doctor, nurse or pharmacist before following any medical regimen to see if it is safe and effective for you

## 2022-04-11 ENCOUNTER — SOCIAL WORK (OUTPATIENT)
Dept: BEHAVIORAL/MENTAL HEALTH CLINIC | Facility: CLINIC | Age: 32
End: 2022-04-11
Payer: COMMERCIAL

## 2022-04-11 DIAGNOSIS — F41.0 PANIC ATTACKS: ICD-10-CM

## 2022-04-11 DIAGNOSIS — F41.1 GENERALIZED ANXIETY DISORDER: ICD-10-CM

## 2022-04-11 DIAGNOSIS — F90.0 ATTENTION DEFICIT HYPERACTIVITY DISORDER (ADHD), PREDOMINANTLY INATTENTIVE TYPE: ICD-10-CM

## 2022-04-11 DIAGNOSIS — F33.1 MODERATE RECURRENT MAJOR DEPRESSION (HCC): ICD-10-CM

## 2022-04-11 PROCEDURE — 90834 PSYTX W PT 45 MINUTES: CPT | Performed by: SOCIAL WORKER

## 2022-04-11 NOTE — PSYCH
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     Attention deficit hyperactivity disorder (ADHD), predominantly inattentive type      Generalized anxiety disorder      Panic attacks      Moderate recurrent major depression (HCC)

## 2022-05-09 ENCOUNTER — SOCIAL WORK (OUTPATIENT)
Dept: BEHAVIORAL/MENTAL HEALTH CLINIC | Facility: CLINIC | Age: 32
End: 2022-05-09
Payer: COMMERCIAL

## 2022-05-09 DIAGNOSIS — F33.1 MODERATE RECURRENT MAJOR DEPRESSION (HCC): ICD-10-CM

## 2022-05-09 DIAGNOSIS — F90.0 ATTENTION DEFICIT HYPERACTIVITY DISORDER (ADHD), PREDOMINANTLY INATTENTIVE TYPE: ICD-10-CM

## 2022-05-09 PROCEDURE — 90834 PSYTX W PT 45 MINUTES: CPT | Performed by: SOCIAL WORKER

## 2022-05-10 NOTE — PSYCH
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     Attention deficit hyperactivity disorder (ADHD), predominantly inattentive type      Generalized anxiety disorder      Panic attacks      Moderate recurrent major depression (HCC)

## 2022-06-07 ENCOUNTER — TELEPHONE (OUTPATIENT)
Dept: PSYCHIATRY | Facility: CLINIC | Age: 32
End: 2022-06-07

## 2022-06-07 ENCOUNTER — SOCIAL WORK (OUTPATIENT)
Dept: BEHAVIORAL/MENTAL HEALTH CLINIC | Facility: CLINIC | Age: 32
End: 2022-06-07
Payer: COMMERCIAL

## 2022-06-07 DIAGNOSIS — F41.0 PANIC ATTACKS: ICD-10-CM

## 2022-06-07 DIAGNOSIS — F33.1 MODERATE RECURRENT MAJOR DEPRESSION (HCC): ICD-10-CM

## 2022-06-07 DIAGNOSIS — F90.0 ATTENTION DEFICIT HYPERACTIVITY DISORDER (ADHD), PREDOMINANTLY INATTENTIVE TYPE: ICD-10-CM

## 2022-06-07 DIAGNOSIS — F41.1 GENERALIZED ANXIETY DISORDER: ICD-10-CM

## 2022-06-07 PROCEDURE — 90834 PSYTX W PT 45 MINUTES: CPT | Performed by: SOCIAL WORKER

## 2022-06-07 NOTE — TELEPHONE ENCOUNTER
Received a message from [facility]: Corinna Diehl needs a refill of fluoxetine  Prescription renewed 3/29/22 #90 with 1 RF  Spoke with pharmacist at [facility]  Refill available and will prepare  Spoke with Corinna Diehl and reviewed above

## 2022-06-09 NOTE — PSYCH
Pain:  none     0     Current suicide risk : Low      D:Amrik spoke today about his feelings re: his future career path and his recent interactions with his supervisor and Director as he has expressed to them his interest to grow in another area of the company  He also expressed feelings re: his relationship with his paramour with re: to their future    A: Valentina Abarca  was once again very open and insightful re: the above  He remains concerned that he and Tanisha may not be moving in parallel directions  P: Upcoming sessions will be used to continue to support him with the above    Couples therapy is recommended at this time but has not been pursued          This session lasted for 50 minutes        This note was not shared with the patient due to this is a psychotherapy note             Encounter Diagnoses   Name Primary?     Attention deficit hyperactivity disorder (ADHD), predominantly inattentive type      Generalized anxiety disorder      Panic attacks      Moderate recurrent major depression (HCC)
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
## 2022-06-14 ENCOUNTER — TELEMEDICINE (OUTPATIENT)
Dept: PSYCHIATRY | Facility: CLINIC | Age: 32
End: 2022-06-14
Payer: COMMERCIAL

## 2022-06-14 DIAGNOSIS — F41.0 PANIC ATTACKS: ICD-10-CM

## 2022-06-14 DIAGNOSIS — F90.0 ATTENTION DEFICIT HYPERACTIVITY DISORDER (ADHD), PREDOMINANTLY INATTENTIVE TYPE: ICD-10-CM

## 2022-06-14 DIAGNOSIS — F33.1 MODERATE RECURRENT MAJOR DEPRESSION (HCC): Primary | ICD-10-CM

## 2022-06-14 DIAGNOSIS — F41.1 GENERALIZED ANXIETY DISORDER: ICD-10-CM

## 2022-06-14 PROCEDURE — 99214 OFFICE O/P EST MOD 30 MIN: CPT | Performed by: STUDENT IN AN ORGANIZED HEALTH CARE EDUCATION/TRAINING PROGRAM

## 2022-06-14 RX ORDER — FLUOXETINE HYDROCHLORIDE 20 MG/1
20 CAPSULE ORAL DAILY
Qty: 90 CAPSULE | Refills: 3 | Status: SHIPPED | OUTPATIENT
Start: 2022-06-14 | End: 2022-09-12

## 2022-06-30 ENCOUNTER — OFFICE VISIT (OUTPATIENT)
Dept: URGENT CARE | Age: 32
End: 2022-06-30
Payer: COMMERCIAL

## 2022-06-30 VITALS
SYSTOLIC BLOOD PRESSURE: 122 MMHG | DIASTOLIC BLOOD PRESSURE: 80 MMHG | HEART RATE: 57 BPM | OXYGEN SATURATION: 97 % | TEMPERATURE: 96.9 F | RESPIRATION RATE: 16 BRPM

## 2022-06-30 DIAGNOSIS — B02.21 RAMSAY HUNT AURICULAR SYNDROME: Primary | ICD-10-CM

## 2022-06-30 PROCEDURE — 99213 OFFICE O/P EST LOW 20 MIN: CPT | Performed by: PHYSICIAN ASSISTANT

## 2022-06-30 RX ORDER — VALACYCLOVIR HYDROCHLORIDE 1 G/1
1000 TABLET, FILM COATED ORAL 3 TIMES DAILY
Qty: 21 TABLET | Refills: 0 | Status: SHIPPED | OUTPATIENT
Start: 2022-06-30 | End: 2022-06-30

## 2022-06-30 RX ORDER — VALACYCLOVIR HYDROCHLORIDE 1 G/1
1000 TABLET, FILM COATED ORAL 3 TIMES DAILY
Qty: 21 TABLET | Refills: 0 | Status: SHIPPED | OUTPATIENT
Start: 2022-06-30 | End: 2022-07-28

## 2022-06-30 RX ORDER — PREDNISONE 10 MG/1
50 TABLET ORAL DAILY
Qty: 25 TABLET | Refills: 0 | Status: SHIPPED | OUTPATIENT
Start: 2022-06-30 | End: 2022-07-05

## 2022-06-30 NOTE — PATIENT INSTRUCTIONS
Take medications as prescribed  Follow-up with ENT, ENT recommendations and treatment course to supersede mine  If symptoms worsen, report to the emergency department  Shingles   AMBULATORY CARE:   Shingles  is a painful rash  Shingles is caused by the same virus that causes chickenpox (varicella-zoster)  After you get chickenpox, the virus stays in your body for several years without causing any symptoms  Shingles occurs when the virus becomes active again  The active virus travels along a nerve to your skin and causes a rash  Common signs and symptoms include the following:  Shingles often starts with pain in the back, chest, neck, or face  A rash then develops in the same area  The rash is usually found on only one side of the body  The rash may feel itchy or painful  It starts as red dots that become blisters filled with fluid  The blisters usually grow bigger, become filled with pus, and then crust over after a few days  You may also have any of the following:  Fatigue and muscle weakness    Pain when your skin is lightly touched    Headache    Fever    Eye pain when exposed to light       Call your local emergency number (911 in the [address] [facility]) if:   You have trouble moving your arms, legs, or face  You become confused, or have difficulty speaking  You have a seizure  Seek care immediately if:   You have weakness in an arm or leg  You have dizziness, a severe headache, or hearing or vision loss  You have painful, red, warm skin around the blisters, or the blisters drain pus  Your neck is stiff or you have trouble moving it  Call your doctor if:   You feel weak or have a headache  You have a cough, chills, or a fever  You have abdominal pain or nausea, or you are vomiting  Your rash becomes more itchy or painful  Your rash spreads to other parts of your body  Your pain worsens and does not go away even after you take medicine      You have questions or concerns about your condition or care  Medicines: You may need any of the following:  Antiviral medicine  helps decrease symptoms and healing time  They may also decrease your risk of developing nerve pain  You will need to start taking them within 3 days of the start of symptoms to prevent nerve pain  Pain medicine  may be prescribed or suggested by your healthcare provider  You may need NSAIDs, acetaminophen, or opioid medicine depending on how much pain you are in  Do not wait until the pain is severe before you take more pain medicine  Topical anesthetics  are used to numb the skin and decrease pain  They can be a cream, gel, spray, or patch  Anticonvulsants  decrease nerve pain and may help you sleep at night  Antidepressants  may be used to decrease nerve pain  Self-care:  Keep your rash clean and dry  Cover your rash with a bandage or clothing  Do not use bandages that stick to your skin  The sticky part may irritate your skin and make your rash last longer  Prevent the spread of shingles:       Wash your hands often  Wash your hands several times each day  Wash after you use the bathroom, change a child's diaper, and before you prepare or eat food  Use soap and water every time  Rub your soapy hands together, lacing your fingers  Wash the front and back of your hands, and in between your fingers  Use the fingers of one hand to scrub under the fingernails of the other hand  Wash for at least 20 seconds  Rinse with warm, running water for several seconds  Then dry your hands with a clean towel or paper towel  Use hand  that contains alcohol if soap and water are not available  Do not touch your eyes, nose, or mouth without washing your hands first          Cover a sneeze or cough  Use a tissue that covers your mouth and nose  Throw the tissue away in a trash can right away  Use the bend of your arm if a tissue is not available   Wash your hands well with soap and water or use a hand   Stay away from others while you are sick  Avoid crowds as much as possible  Ask about vaccines you may need  Talk to your healthcare provider about your vaccine history  He or she will tell you which vaccines you need, and when to get them  Prevent shingles or another shingles outbreak:  A vaccine may be given to help prevent shingles  You can get the vaccine even if you already had shingles  The vaccine can help prevent a future outbreak  If you do get shingles again, the vaccine can keep it from becoming severe  The vaccine comes in 2 forms  Your healthcare provider will tell you which form is right for you  The decision is based on your age and any medical conditions you have  A 2-dose vaccine is usually given to adults 48 years or older  A 1-dose vaccine may be given to adults 61 years or older  Follow up with your doctor as directed:  Write down your questions so you remember to ask them during your visits  For more information:   [facility]  [address] , [address]  Phone: 2- 026 - 2969691  Phone: 2- 644 - 8432512  Web Address: [employer] com br    © Copyright [employer] 2022 Information is for End User's use only and may not be sold, redistributed or otherwise used for commercial purposes  All illustrations and images included in CareNotes® are the copyrighted property of A D A M , Inc  or Cristela Rivas  The above information is an  only  It is not intended as medical advice for individual conditions or treatments  Talk to your doctor, nurse or pharmacist before following any medical regimen to see if it is safe and effective for you
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
## 2022-07-12 ENCOUNTER — SOCIAL WORK (OUTPATIENT)
Dept: BEHAVIORAL/MENTAL HEALTH CLINIC | Facility: CLINIC | Age: 32
End: 2022-07-12
Payer: COMMERCIAL

## 2022-07-12 DIAGNOSIS — F41.0 PANIC ATTACKS: ICD-10-CM

## 2022-07-12 DIAGNOSIS — F90.0 ATTENTION DEFICIT HYPERACTIVITY DISORDER (ADHD), PREDOMINANTLY INATTENTIVE TYPE: ICD-10-CM

## 2022-07-12 DIAGNOSIS — F33.1 MODERATE RECURRENT MAJOR DEPRESSION (HCC): ICD-10-CM

## 2022-07-12 DIAGNOSIS — F41.1 GENERALIZED ANXIETY DISORDER: ICD-10-CM

## 2022-07-12 PROCEDURE — 90834 PSYTX W PT 45 MINUTES: CPT | Performed by: SOCIAL WORKER

## 2022-07-13 NOTE — PSYCH
Pain:  none     0     Current suicide risk : Low      D:Amrik spoke today about his feelings re: his relationship with his paramour with re: to their future  He expressed his lack of contact with her during time away with friends and lack of interest in spending time with her during an upcoming family trip  A: Hua Hendricks  was once again very open and insightful re: the above  He remains concerned that he and Margarita Lou may not be moving in parallel directions, and is preparing himself to discuss this with her  P: Upcoming sessions will be used to continue to support him with the above    Couples therapy is recommended at this time and he has requested to bring her into a future session           This session lasted for 50 minutes        This note was not shared with the patient due to this is a psychotherapy note             Encounter Diagnoses   Name Primary?     Attention deficit hyperactivity disorder (ADHD), predominantly inattentive type      Generalized anxiety disorder      Panic attacks      Moderate recurrent major depression (HCC)

## 2022-08-18 ENCOUNTER — SOCIAL WORK (OUTPATIENT)
Dept: BEHAVIORAL/MENTAL HEALTH CLINIC | Facility: CLINIC | Age: 32
End: 2022-08-18
Payer: COMMERCIAL

## 2022-08-18 DIAGNOSIS — F41.0 PANIC ATTACKS: ICD-10-CM

## 2022-08-18 DIAGNOSIS — F90.0 ATTENTION DEFICIT HYPERACTIVITY DISORDER (ADHD), PREDOMINANTLY INATTENTIVE TYPE: ICD-10-CM

## 2022-08-18 DIAGNOSIS — F41.1 GENERALIZED ANXIETY DISORDER: ICD-10-CM

## 2022-08-18 PROCEDURE — 90834 PSYTX W PT 45 MINUTES: CPT | Performed by: SOCIAL WORKER

## 2022-08-18 NOTE — PSYCH
Treatment Plan Tracking    # 1Treatment Plan not completed within required time limits due to: Shanice Levine presented with emotional/behavioral issues that required clinical intervention

## 2022-08-18 NOTE — PSYCH
Pain:  none     0     Current suicide risk : Low      D:Amrik spoke further  today about his conflicted feelings re: his relationship with his paramour and their future  He shared details of their disagreements following his decision not to inform her that he had been pulled over and the reasons for this decision as well as her reaction after watching hours of video on their home cameras to "find out what he was hiding "  A:  Amrik  was once again very open and insightful re: the above  He remains concerned about their future together, but is not prepared to talk with her about this as evidenced by her response above  P: Upcoming sessions will be used to continue to support him with the above    Couples therapy is recommended at this time and he has mad arrangements to bring her into his next session           This session lasted for 50 minutes        This note was not shared with the patient due to this is a psychotherapy note             Encounter Diagnoses   Name Primary?     Attention deficit hyperactivity disorder (ADHD), predominantly inattentive type      Generalized anxiety disorder      Panic attacks      Moderate recurrent major depression (HCC)

## 2022-09-03 DIAGNOSIS — K21.9 GASTROESOPHAGEAL REFLUX DISEASE WITHOUT ESOPHAGITIS: ICD-10-CM

## 2022-09-03 RX ORDER — OMEPRAZOLE 40 MG/1
CAPSULE, DELAYED RELEASE ORAL
Qty: 180 CAPSULE | Refills: 0 | Status: SHIPPED | OUTPATIENT
Start: 2022-09-03 | End: 2022-09-22 | Stop reason: SDUPTHER

## 2022-09-08 ENCOUNTER — SOCIAL WORK (OUTPATIENT)
Dept: BEHAVIORAL/MENTAL HEALTH CLINIC | Facility: CLINIC | Age: 32
End: 2022-09-08
Payer: COMMERCIAL

## 2022-09-08 DIAGNOSIS — F41.1 GENERALIZED ANXIETY DISORDER: ICD-10-CM

## 2022-09-08 DIAGNOSIS — F41.0 PANIC ATTACKS: ICD-10-CM

## 2022-09-08 DIAGNOSIS — F90.0 ATTENTION DEFICIT HYPERACTIVITY DISORDER (ADHD), PREDOMINANTLY INATTENTIVE TYPE: ICD-10-CM

## 2022-09-08 DIAGNOSIS — F33.1 MODERATE RECURRENT MAJOR DEPRESSION (HCC): ICD-10-CM

## 2022-09-08 PROCEDURE — 90847 FAMILY PSYTX W/PT 50 MIN: CPT | Performed by: SOCIAL WORKER

## 2022-09-08 NOTE — PSYCH
Pain:  none     0     Current suicide risk : Low      D:Amrik and his paramour were seen for a Family session today during which time Maria E Tejada expressed his desire to engage in an "open dialogue" today as he "needs help telling Nuria Postin things even though she will be upset "  He went on to share that he feels as if he "does not have a voice" at home and that he "wants her to understand how she treats him "   A:  Amrik  and Tanisha both struggled with sarcasm and being able to provide / accept feedback from each other  Neither of them appear to put time into spending with each other, but do seem to want to compromise  P: Upcoming sessions will be used to continue to support him with the above    Couples therapy is recommended at this time        This session lasted for 50 minutes        This note was not shared with the patient due to this is a psychotherapy note             Encounter Diagnoses   Name Primary?     Attention deficit hyperactivity disorder (ADHD), predominantly inattentive type      Generalized anxiety disorder      Panic attacks      Moderate recurrent major depression (HCC)

## 2022-09-20 ENCOUNTER — DOCUMENTATION (OUTPATIENT)
Dept: BEHAVIORAL/MENTAL HEALTH CLINIC | Facility: CLINIC | Age: 32
End: 2022-09-20

## 2022-09-20 NOTE — PROGRESS NOTES
Treatment Plan Tracking    # 1Treatment Plan not completed within required time limits due to: this worker was out of the office for the scheduled appt  Crow Manifold

## 2022-09-22 ENCOUNTER — TELEMEDICINE (OUTPATIENT)
Dept: PSYCHIATRY | Facility: CLINIC | Age: 32
End: 2022-09-22
Payer: COMMERCIAL

## 2022-09-22 DIAGNOSIS — F90.0 ATTENTION DEFICIT HYPERACTIVITY DISORDER (ADHD), PREDOMINANTLY INATTENTIVE TYPE: ICD-10-CM

## 2022-09-22 DIAGNOSIS — F33.1 MODERATE RECURRENT MAJOR DEPRESSION (HCC): Primary | ICD-10-CM

## 2022-09-22 DIAGNOSIS — F41.1 GENERALIZED ANXIETY DISORDER: ICD-10-CM

## 2022-09-22 DIAGNOSIS — F41.0 PANIC ATTACKS: ICD-10-CM

## 2022-09-22 PROCEDURE — 90833 PSYTX W PT W E/M 30 MIN: CPT | Performed by: STUDENT IN AN ORGANIZED HEALTH CARE EDUCATION/TRAINING PROGRAM

## 2022-09-22 PROCEDURE — 99214 OFFICE O/P EST MOD 30 MIN: CPT | Performed by: STUDENT IN AN ORGANIZED HEALTH CARE EDUCATION/TRAINING PROGRAM
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
## 2022-10-04 ENCOUNTER — SOCIAL WORK (OUTPATIENT)
Dept: BEHAVIORAL/MENTAL HEALTH CLINIC | Facility: CLINIC | Age: 32
End: 2022-10-04
Payer: COMMERCIAL

## 2022-10-04 DIAGNOSIS — F33.1 MODERATE RECURRENT MAJOR DEPRESSION (HCC): ICD-10-CM

## 2022-10-04 DIAGNOSIS — F41.0 PANIC ATTACKS: ICD-10-CM

## 2022-10-04 DIAGNOSIS — F90.0 ATTENTION DEFICIT HYPERACTIVITY DISORDER (ADHD), PREDOMINANTLY INATTENTIVE TYPE: ICD-10-CM

## 2022-10-04 DIAGNOSIS — F41.1 GENERALIZED ANXIETY DISORDER: ICD-10-CM

## 2022-10-04 PROCEDURE — 90834 PSYTX W PT 45 MINUTES: CPT | Performed by: SOCIAL WORKER

## 2022-10-04 NOTE — PSYCH
Treatment Plan Tracking    # 1Treatment Plan not completed within required time limits due to: Mariza Reinoso presented with emotional/behavioral issues that required clinical intervention

## 2022-10-04 NOTE — PSYCH
Pain:  none     0     Current suicide risk : Low      D:Amrik spoke  today about his previous joint  Session with his paramour during which their conflicts communication struggles were discussed  He verbalized his uncertainty about their future as well as his continued desire for Tanisha to make changes in herself  A: Chelseael Drilling  was once again very open and insightful re: the above  He remains concerned about their future together, and allowed this worker to probe reasons why he remains in this relationship  P: Upcoming sessions will be used to continue to support him with the above        This session lasted for 50 minutes        This note was not shared with the patient due to this is a psychotherapy note             Encounter Diagnoses   Name Primary?     Attention deficit hyperactivity disorder (ADHD), predominantly inattentive type      Generalized anxiety disorder      Panic attacks      Moderate recurrent major depression (HCC)

## 2022-11-01 ENCOUNTER — SOCIAL WORK (OUTPATIENT)
Dept: BEHAVIORAL/MENTAL HEALTH CLINIC | Facility: CLINIC | Age: 32
End: 2022-11-01

## 2022-11-01 DIAGNOSIS — F41.1 GENERALIZED ANXIETY DISORDER: ICD-10-CM

## 2022-11-01 DIAGNOSIS — F41.0 PANIC ATTACKS: ICD-10-CM

## 2022-11-01 DIAGNOSIS — F33.1 MODERATE RECURRENT MAJOR DEPRESSION (HCC): ICD-10-CM

## 2022-11-01 DIAGNOSIS — F90.0 ATTENTION DEFICIT HYPERACTIVITY DISORDER (ADHD), PREDOMINANTLY INATTENTIVE TYPE: Primary | ICD-10-CM

## 2022-11-01 NOTE — PSYCH
Psychotherapy Provided: Individual Psychotherapy 50 minutes     Length of time in session: 50 minutes, follow up in 4 week    Encounter Diagnosis     ICD-10-CM    1  Attention deficit hyperactivity disorder (ADHD), predominantly inattentive type  F90 0    2  Generalized anxiety disorder  F41 1    3  Panic attacks  F41 0    4  Moderate recurrent major depression ([facility] [address] )  F33 1        Goals addressed in session: Goal 1     Pain:      none    0    Current suicide risk : Low     D:Amrik spoke  today about his feelings re: the books he has been reading as he goes through "75 hard" again  He shared his desire to "help others" and share what he has learned in a career that would allow him to do this  A:  Amrik  spoke very little today re: his relationship with his paramour, but instead focused on himself, his goals and his future  He indicated a desire to remain in therapy as he has found therapy to be a supportive place and has had a therapist "since he was 11 "   P: Upcoming sessions will be used to continue to support him with the above        [address]: Diagnosis and Treatment Plan explained to Sarkis Gutierrez relates understanding diagnosis and is agreeable to Treatment Plan   Yes     Visit start and stop times:    11/02/22  Start Time: 1400  Stop Time: 1450  Total Visit Time: 50 minutes

## 2022-11-01 NOTE — BH TREATMENT PLAN
Lissy Diaz  1990         Date of Initial Treatment Plan: 6/21/18  Date of Current Treatment Plan: 11/1/22     Treatment Plan Number 9     Strengths/Personal Resources for Self Care: I care A LOT, I know I have potential, I am productive at task completion, I pay attention to getting things done       Diagnosis: Anxiety, Depression     Area of Needs: I am not currently in a role that is fulfilling       Long Term Goal 1: AI want to be a leader, to be someone who inspires others  Target Date:5/1/23  Completion Date: na         Short Term Objectives for Goal 1: AI will let Tanisha know what I want / need in our relationship  I will reach out to various depts / leaders at [facility] to inquire about job opportunities  I will continue to read books that inspire my thinking          GOAL 1: Modality: Individual 2x per month   Completion Date na and The person(s) responsible for carrying out the plan is  Jade Rowley [address] Treatment Plan [facility]: Diagnosis and Treatment Plan explained to Sulaiman Graft relates understanding diagnosis and is agreeable to Treatment Plan          Client Comments : Please share your thoughts, feelings, need and/or experiences regarding your treatment plan:    __________________________________________________________________    Treatment Plan reviewed but not signed due to COVID restrictions

## 2022-12-01 ENCOUNTER — TELEPHONE (OUTPATIENT)
Dept: PSYCHIATRY | Facility: CLINIC | Age: 32
End: 2022-12-01

## 2022-12-01 NOTE — LETTER
22     Petr Castillo   : 1990   [place] [id number]       It is the policy of [address] [place] [address] E to monitor and manage appointments that have been no-showed or cancelled with less than 48-hour notice  This is necessary to ensure that we are able to provide timely access for all patients to our providers  Undue numbers of unutilized appointments delays necessary medical care for all patients  Dear Petr Castillo       We are sorry that you missed your appointment with [facility] on 2022  Your health and follow-up care are important to us  We want to make you aware of your next appointment with [facility] that is scheduled for Thursday,  2022 at 4 pm     Please be aware that our office policy states that if you 'no show' two Therapy appointments in a row without prior notice of cancellation, or three or more in a calendar year, you may be discharged from Therapy treatment  We want to bring this to your attention now to prevent an interruption of your care  If you have any questions about this policy, please call us at the number above  Thank you in advance for your cooperation and assistance         Sincerely,      [address] [place] [address] E Support Staff

## 2022-12-14 ENCOUNTER — TELEPHONE (OUTPATIENT)
Dept: PSYCHIATRY | Facility: CLINIC | Age: 32
End: 2022-12-14

## 2022-12-14 NOTE — TELEPHONE ENCOUNTER
Spoke to pt to inform appt on 12/15/22 will be virtual pt was ok with that and would like to connect in [place], Thank you

## 2022-12-15 ENCOUNTER — TELEMEDICINE (OUTPATIENT)
Dept: BEHAVIORAL/MENTAL HEALTH CLINIC | Facility: CLINIC | Age: 32
End: 2022-12-15

## 2022-12-15 DIAGNOSIS — F41.0 PANIC ATTACKS: ICD-10-CM

## 2022-12-15 DIAGNOSIS — F90.0 ATTENTION DEFICIT HYPERACTIVITY DISORDER (ADHD), PREDOMINANTLY INATTENTIVE TYPE: Primary | ICD-10-CM

## 2022-12-15 DIAGNOSIS — F41.1 GENERALIZED ANXIETY DISORDER: ICD-10-CM

## 2022-12-15 DIAGNOSIS — F33.1 MODERATE RECURRENT MAJOR DEPRESSION (HCC): ICD-10-CM
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
## 2023-01-16 ENCOUNTER — TELEPHONE (OUTPATIENT)
Dept: PSYCHIATRY | Facility: CLINIC | Age: 33
End: 2023-01-16

## 2023-01-16 ENCOUNTER — SOCIAL WORK (OUTPATIENT)
Dept: BEHAVIORAL/MENTAL HEALTH CLINIC | Facility: CLINIC | Age: 33
End: 2023-01-16

## 2023-01-16 DIAGNOSIS — F41.1 GENERALIZED ANXIETY DISORDER: Primary | ICD-10-CM

## 2023-01-16 DIAGNOSIS — F90.0 ATTENTION DEFICIT HYPERACTIVITY DISORDER (ADHD), PREDOMINANTLY INATTENTIVE TYPE: ICD-10-CM

## 2023-01-16 DIAGNOSIS — F33.1 MODERATE RECURRENT MAJOR DEPRESSION (HCC): ICD-10-CM

## 2023-01-16 NOTE — PSYCH
Behavioral Health Psychotherapy Progress Note    Psychotherapy Provided: Individual Psychotherapy     Encounter Diagnoses   Name Primary? • Generalized anxiety disorder Yes   • Moderate recurrent major depression ([address] )    • Attention deficit hyperactivity disorder (ADHD), predominantly inattentive type          Goals addressed in session: Goal 1     DATA: Lucille Rosales spoke  today about his feelings re: how much more anxiety he has been experiencing over the past several weeks  He indicated that he has remained frustrated with his paramour's poor self care and her unwillingness to make changes  During this session, this clinician used the following therapeutic modalities: Cognitive Behavioral Therapy, Dialectical Behavior Therapy, Motivational Interviewing and Supportive Psychotherapy    Substance Abuse was not addressed during this session  If the client is diagnosed with a co-occurring substance use disorder, please indicate any changes in the frequency or amount of use:   Stage of change for addressing substance use diagnoses: No substance use/Not applicable    ASSESSMENT:  Live Kan presents with a Anxious mood  He appears to be somewhat uncertain of his relationship status as his paramour has become more removed and distant from him over the past month  He indicated that he remains unhappy at work, as well, and is distraught that his parents are relocating out of state  his affect is Normal range and intensity, which is congruent, with his mood and the content of the session  The client has not made progress on their goals  Live Kan presents with a minimal risk of suicide, minimal risk of self-harm, and minimal risk of harm to others  For any risk assessment that surpasses a "low" rating, a safety plan must be developed      A safety plan was indicated: no  If yes, describe in detail     PLAN: Between sessions, Live Kan will speak with his PCP and treating Psychiatrist re: his increased anxiety while also considering ways he can utilize more types of distraction    At the next session, the therapist will use Supportive Psychotherapy to address the above  Behavioral Health Treatment Plan and Discharge Planning: Blanca Hannon is aware of and agrees to continue to work on their treatment plan  They have identified and are working toward their discharge goals   yes    Visit start and stop times:    01/17/23  Start Time: 1500  Stop Time: 1550  Total Visit Time: 50 minutes

## 2023-01-16 NOTE — TELEPHONE ENCOUNTER
Patient requested his virtual appointment for 1/24/23 at 11:30 with Dr Rachel Kuo be change to an in office medication management appointment  Changed appointment in [address] [place] [address] system

## 2023-01-18 ENCOUNTER — OFFICE VISIT (OUTPATIENT)
Dept: CARDIOLOGY CLINIC | Facility: CLINIC | Age: 33
End: 2023-01-18

## 2023-01-18 ENCOUNTER — TELEPHONE (OUTPATIENT)
Dept: CARDIOLOGY CLINIC | Facility: CLINIC | Age: 33
End: 2023-01-18

## 2023-01-18 VITALS
DIASTOLIC BLOOD PRESSURE: 60 MMHG | BODY MASS INDEX: 29.8 KG/M2 | WEIGHT: 220 LBS | HEART RATE: 50 BPM | SYSTOLIC BLOOD PRESSURE: 100 MMHG | OXYGEN SATURATION: 99 % | HEIGHT: 72 IN

## 2023-01-18 DIAGNOSIS — Z86.74 HISTORY OF CARDIAC ARREST: ICD-10-CM

## 2023-01-18 DIAGNOSIS — R00.2 PALPITATIONS: ICD-10-CM

## 2023-01-18 DIAGNOSIS — Q24.4 SUBVALVAR AORTIC STENOSIS: Primary | ICD-10-CM

## 2023-01-18 NOTE — PATIENT INSTRUCTIONS
You were seen today in the Cardiology office for evaluation of palpitations  You will receive your Illene Smart in the mail  Thank you for choosing [address]  Please call our office or use [employer] with any questions
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
## 2023-01-31 ENCOUNTER — HOSPITAL ENCOUNTER (OUTPATIENT)
Dept: NON INVASIVE DIAGNOSTICS | Facility: CLINIC | Age: 33
Discharge: HOME | End: 2023-01-31

## 2023-01-31 VITALS
WEIGHT: 220 LBS | BODY MASS INDEX: 29.8 KG/M2 | DIASTOLIC BLOOD PRESSURE: 60 MMHG | HEIGHT: 72 IN | SYSTOLIC BLOOD PRESSURE: 100 MMHG | HEART RATE: 50 BPM

## 2023-01-31 DIAGNOSIS — Q24.4 SUBVALVAR AORTIC STENOSIS: ICD-10-CM

## 2023-01-31 LAB
AORTIC ROOT: 3.3 CM
ASCENDING AORTA: 3.1 CM
AV AREA PEAK VELOCITY: 3.7 CM2
AV LVOT MEAN GRADIENT: 2 MMHG
AV LVOT PEAK GRADIENT: 3 MMHG
AV PEAK GRADIENT: 3 MMHG
DOP CALC LVOT AREA: 3.8 CM2
DOP CALC LVOT DIAMETER: 2.2 CM
DOP CALC LVOT PEAK VEL VTI: 19.67 CM
DOP CALC LVOT PEAK VEL: 0.89 M/S
DOP CALC LVOT STROKE INDEX: 33.3 ML/M2
DOP CALC LVOT STROKE VOLUME: 74.73 CM3
E WAVE DECELERATION TIME: 260 MS
FRACTIONAL SHORTENING: 29 % (ref 28–44)
INTERVENTRICULAR SEPTUM IN DIASTOLE (PARASTERNAL SHORT AXIS VIEW): 0.9 CM
INTERVENTRICULAR SEPTUM: 0.9 CM (ref 0.6–1.1)
LAAS-AP2: 22.9 CM2
LAAS-AP4: 20.5 CM2
LEFT ATRIUM SIZE: 4 CM
LEFT INTERNAL DIMENSION IN SYSTOLE: 3.9 CM (ref 2.1–4)
LEFT VENTRICULAR INTERNAL DIMENSION IN DIASTOLE: 5.5 CM (ref 3.5–6)
LEFT VENTRICULAR POSTERIOR WALL IN END DIASTOLE: 1 CM
LEFT VENTRICULAR STROKE VOLUME: 82 ML
LVSV (TEICH): 82 ML
MV E'TISSUE VEL-SEP: 12 CM/S
MV PEAK A VEL: 0.4 M/S
MV PEAK E VEL: 86 CM/S
MV STENOSIS PRESSURE HALF TIME: 75 MS
MV VALVE AREA P 1/2 METHOD: 2.93 CM2
RA PRESSURE ESTIMATED: 8 MMHG
RIGHT ATRIUM AREA SYSTOLE A4C: 16.7 CM2
RIGHT VENTRICLE ID DIMENSION: 4.2 CM
SL CV LEFT ATRIUM LENGTH A2C: 5.7 CM
SL CV LV EF: 60
SL CV PED ECHO LEFT VENTRICLE DIASTOLIC VOLUME (MOD BIPLANE) 2D: 147 ML
SL CV PED ECHO LEFT VENTRICLE SYSTOLIC VOLUME (MOD BIPLANE) 2D: 65 ML
TRICUSPID ANNULAR PLANE SYSTOLIC EXCURSION: 2.4 CM

## 2023-02-02 ENCOUNTER — SOCIAL WORK (OUTPATIENT)
Dept: BEHAVIORAL/MENTAL HEALTH CLINIC | Facility: CLINIC | Age: 33
End: 2023-02-02

## 2023-02-02 DIAGNOSIS — F41.1 GENERALIZED ANXIETY DISORDER: Primary | ICD-10-CM

## 2023-02-02 DIAGNOSIS — F33.1 MODERATE RECURRENT MAJOR DEPRESSION (HCC): ICD-10-CM

## 2023-02-02 DIAGNOSIS — F90.0 ATTENTION DEFICIT HYPERACTIVITY DISORDER (ADHD), PREDOMINANTLY INATTENTIVE TYPE: ICD-10-CM

## 2023-02-02 NOTE — PSYCH
Behavioral Health Psychotherapy Progress Note    Psychotherapy Provided: Individual Psychotherapy     Encounter Diagnoses   Name Primary? • Generalized anxiety disorder Yes   • Moderate recurrent major depression ([address] )    • Attention deficit hyperactivity disorder (ADHD), predominantly inattentive type            Goals addressed in session: Goal 1     DATA: Justa Madera spoke  today about his feelings re: his appt with Cardiology and 14 day monitor that he is currently wearing  He again discussed that he has remained frustrated with his paramour's poor self care and her unwillingness to make changes  During this session, this clinician used the following therapeutic modalities: Cognitive Behavioral Therapy, Dialectical Behavior Therapy, Motivational Interviewing and Supportive Psychotherapy    Substance Abuse was not addressed during this session  If the client is diagnosed with a co-occurring substance use disorder, please indicate any changes in the frequency or amount of use:   Stage of change for addressing substance use diagnoses: No substance use/Not applicable    ASSESSMENT:  Roderick Beck presents with a Euthymic/ normal mood  He appears to be somewhat uncertain of his future as a result of the lack of change in his relationship  He also shared today his fears of being, living alone, particularly due to the death of his older sister  his affect is Normal range and intensity, which is congruent, with his mood and the content of the session  The client has not made progress on their goals  Roderick Beck presents with a minimal risk of suicide, minimal risk of self-harm, and minimal risk of harm to others  For any risk assessment that surpasses a "low" rating, a safety plan must be developed  A safety plan was indicated: no  If yes, describe in detail     PLAN: Between sessions, Roderick Beck will continue to address his health issues while monitoring his anxiety    At the next session, the therapist will use Supportive Psychotherapy to address the above  Behavioral Health Treatment Plan and Discharge Planning: Wendy Ruffin is aware of and agrees to continue to work on their treatment plan  They have identified and are working toward their discharge goals   yes    Visit start and stop times:    02/02/23  Start Time: 1200  Stop Time: 1250  Total Visit Time: 50 minutes

## 2023-02-10 ENCOUNTER — CLINICAL SUPPORT (OUTPATIENT)
Dept: CARDIOLOGY CLINIC | Facility: CLINIC | Age: 33
End: 2023-02-10

## 2023-02-10 DIAGNOSIS — R00.2 PALPITATIONS: ICD-10-CM

## 2023-02-20 ENCOUNTER — OFFICE VISIT (OUTPATIENT)
Dept: CARDIOLOGY CLINIC | Facility: CLINIC | Age: 33
End: 2023-02-20

## 2023-02-20 VITALS
BODY MASS INDEX: 29.97 KG/M2 | WEIGHT: 221 LBS | DIASTOLIC BLOOD PRESSURE: 68 MMHG | OXYGEN SATURATION: 98 % | SYSTOLIC BLOOD PRESSURE: 110 MMHG | HEART RATE: 81 BPM

## 2023-02-20 DIAGNOSIS — R00.2 PALPITATIONS: ICD-10-CM

## 2023-02-20 DIAGNOSIS — Q24.4 SUBVALVAR AORTIC STENOSIS: Primary | ICD-10-CM

## 2023-02-20 NOTE — PATIENT INSTRUCTIONS
You were seen today in the Cardiology office for follow up evaluation  Thank you for choosing [address]  Please call our office or use [employer] with any questions
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
## 2023-02-22 ENCOUNTER — SOCIAL WORK (OUTPATIENT)
Dept: BEHAVIORAL/MENTAL HEALTH CLINIC | Facility: CLINIC | Age: 33
End: 2023-02-22

## 2023-02-22 DIAGNOSIS — F41.0 PANIC ATTACKS: ICD-10-CM

## 2023-02-22 DIAGNOSIS — F41.1 GENERALIZED ANXIETY DISORDER: Primary | ICD-10-CM

## 2023-02-22 DIAGNOSIS — F90.0 ATTENTION DEFICIT HYPERACTIVITY DISORDER (ADHD), PREDOMINANTLY INATTENTIVE TYPE: ICD-10-CM

## 2023-02-22 DIAGNOSIS — F33.1 MODERATE RECURRENT MAJOR DEPRESSION (HCC): ICD-10-CM

## 2023-02-22 NOTE — PSYCH
Behavioral Health Psychotherapy Progress Note    Psychotherapy Provided: Individual Psychotherapy     Encounter Diagnoses   Name Primary? • Generalized anxiety disorder Yes   • Moderate recurrent major depression ([address] )    • Attention deficit hyperactivity disorder (ADHD), predominantly inattentive type    • Panic attacks              Goals addressed in session: Goal 1     DATA: EDWARDO spoke  today about his feelings re: his follow up appt with Cardiology during which he was "cleared"  He discussed his struggles to communicate with his paramour and shared details of their ongoing conflicts  During this session, this clinician used the following therapeutic modalities: Cognitive Behavioral Therapy, Dialectical Behavior Therapy, Motivational Interviewing and Supportive Psychotherapy    Substance Abuse was not addressed during this session  If the client is diagnosed with a co-occurring substance use disorder, please indicate any changes in the frequency or amount of use:   Stage of change for addressing substance use diagnoses: No substance use/Not applicable    ASSESSMENT:  Musa Ann presents with a Euthymic/ normal mood  He remains uncertain of his future as a result of the lack of commitment he is hearing from his partner re: the changes that are needed in order for them to "move forward" with  relationship     his affect is Normal range and intensity, which is congruent, with his mood and the content of the session  The client has not made progress on their goals  Musa Ann presents with a minimal risk of suicide, minimal risk of self-harm, and minimal risk of harm to others  For any risk assessment that surpasses a "low" rating, a safety plan must be developed  A safety plan was indicated: no  If yes, describe in detail     PLAN: Between sessions, Musa Ann will continue to address his relationship issues while monitoring his anxiety    At the next session, the therapist will use Supportive Psychotherapy to address the above  Behavioral Health Treatment Plan and Discharge Planning: Iram Le is aware of and agrees to continue to work on their treatment plan  They have identified and are working toward their discharge goals   yes    Visit start and stop times:    02/22/23  Start Time: 1400  Stop Time: 1450  Total Visit Time: 50 minutes

## 2023-02-27 ENCOUNTER — TELEPHONE (OUTPATIENT)
Dept: BEHAVIORAL/MENTAL HEALTH CLINIC | Facility: CLINIC | Age: 33
End: 2023-02-27

## 2023-02-27 DIAGNOSIS — F41.1 GENERALIZED ANXIETY DISORDER: ICD-10-CM

## 2023-02-27 DIAGNOSIS — F41.0 PANIC ATTACKS: ICD-10-CM

## 2023-02-27 RX ORDER — ALPRAZOLAM 0.5 MG/1
TABLET ORAL
Qty: 30 TABLET | Refills: 0 | Status: SHIPPED | OUTPATIENT
Start: 2023-02-27

## 2023-02-27 NOTE — TELEPHONE ENCOUNTER
PDMP website reviewed  Yessica Randolph has been appropriately adherent to controlled psychotropic medications without evidence of abuse or misuse  As such, will send 30-day refill to pharmacy of choice and follow up as necessary

## 2023-02-28 NOTE — TELEPHONE ENCOUNTER
Informed [facility] that Dr Cory Demarco sent the Alprazolam 0 5 mg tab prescription to [address] [place] [address]

## 2023-03-14 ENCOUNTER — TELEPHONE (OUTPATIENT)
Dept: PSYCHIATRY | Facility: CLINIC | Age: 33
End: 2023-03-14

## 2023-03-14 ENCOUNTER — TELEMEDICINE (OUTPATIENT)
Dept: PSYCHIATRY | Facility: CLINIC | Age: 33
End: 2023-03-14

## 2023-03-14 DIAGNOSIS — R00.2 PALPITATIONS: ICD-10-CM

## 2023-03-14 DIAGNOSIS — F41.0 PANIC ATTACKS: ICD-10-CM

## 2023-03-14 DIAGNOSIS — F33.1 MODERATE RECURRENT MAJOR DEPRESSION (HCC): ICD-10-CM

## 2023-03-14 DIAGNOSIS — F41.1 GENERALIZED ANXIETY DISORDER: Primary | ICD-10-CM

## 2023-03-14 NOTE — TELEPHONE ENCOUNTER
Pt called to get his appt for 3/14 at 3:30 switched to a virtual visit due to not feeling well  Writer switched appt   Please connect via email  Thank you
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
## 2023-03-16 ENCOUNTER — TELEMEDICINE (OUTPATIENT)
Dept: BEHAVIORAL/MENTAL HEALTH CLINIC | Facility: CLINIC | Age: 33
End: 2023-03-16

## 2023-03-16 DIAGNOSIS — F41.1 GENERALIZED ANXIETY DISORDER: Primary | ICD-10-CM

## 2023-03-16 DIAGNOSIS — F33.1 MODERATE RECURRENT MAJOR DEPRESSION (HCC): ICD-10-CM

## 2023-03-16 DIAGNOSIS — F90.0 ATTENTION DEFICIT HYPERACTIVITY DISORDER (ADHD), PREDOMINANTLY INATTENTIVE TYPE: ICD-10-CM

## 2023-03-16 DIAGNOSIS — F41.0 PANIC ATTACKS: ICD-10-CM
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
## 2023-03-22 ENCOUNTER — TELEPHONE (OUTPATIENT)
Dept: PSYCHIATRY | Facility: CLINIC | Age: 33
End: 2023-03-22

## 2023-03-22 NOTE — TELEPHONE ENCOUNTER
LVJOHN MARIN 3/14/23  Pt needs a 3 month f/u with Dr Francisca Alexander  Previous appt was virtual  Please schedule on a Thursday or Friday if possible

## 2023-03-30 ENCOUNTER — TELEPHONE (OUTPATIENT)
Dept: PSYCHIATRY | Facility: CLINIC | Age: 33
End: 2023-03-30

## 2023-03-30 NOTE — TELEPHONE ENCOUNTER
Writer called patient to confirm virtual appt for 4/3 at 1 pm  Patient requested appt be switched to in office  Appt  for 4/3 at 1 pm has been switched

## 2023-04-03 ENCOUNTER — SOCIAL WORK (OUTPATIENT)
Dept: BEHAVIORAL/MENTAL HEALTH CLINIC | Facility: CLINIC | Age: 33
End: 2023-04-03

## 2023-04-03 DIAGNOSIS — F41.0 PANIC ATTACKS: ICD-10-CM

## 2023-04-03 DIAGNOSIS — F41.1 GENERALIZED ANXIETY DISORDER: Primary | ICD-10-CM

## 2023-04-03 DIAGNOSIS — F33.1 MODERATE RECURRENT MAJOR DEPRESSION (HCC): ICD-10-CM

## 2023-04-03 NOTE — PSYCH
"Behavioral Health Psychotherapy Progress Note    Psychotherapy Provided: Individual Psychotherapy     Encounter Diagnoses   Name Primary? • Generalized anxiety disorder Yes   • Panic attacks    • Moderate recurrent major depression ([address] )              Goals addressed in session: Goal 1     DATA: EDWARDO spoke  today about his feelings re: his contiued struggles to communicate with his paramour particularly during her time away over the last 10 days  EDWARDO also expressed relief that he has lee successful in obtaining two credit cards and has spoken with both his parents and friends about his relationship issues, concerns  During this session, this clinician used the following therapeutic modalities: Cognitive Behavioral Therapy, Dialectical Behavior Therapy, Motivational Interviewing and Supportive Psychotherapy    Substance Abuse was not addressed during this session  If the client is diagnosed with a co-occurring substance use disorder, please indicate any changes in the frequency or amount of use:   Stage of change for addressing substance use diagnoses: No substance use/Not applicable    ASSESSMENT:  Paul Patel presents with a Euthymic/ normal mood  He remains uncertain of his future as a result of the lack of contact  from his partner which have resulted in him moving forward in making  plans in order to [facility] forward\" with  His own growth  his affect is Normal range and intensity, which is congruent, with his mood and the content of the session  The client has not made progress on their goals  Paul Patel presents with a minimal risk of suicide, minimal risk of self-harm, and minimal risk of harm to others  For any risk assessment that surpasses a \"low\" rating, a safety plan must be developed  A safety plan was indicated: no  If yes, describe in detail     PLAN: Between sessions, Paul Patel will continue to address his relationship issues while monitoring his anxiety    At the next " session, the therapist will use Supportive Psychotherapy to address the above  Behavioral Health Treatment Plan and Discharge Planning: Karon Blanco is aware of and agrees to continue to work on their treatment plan  They have identified and are working toward their discharge goals   yes    Visit start and stop times:    04/03/23  Start Time: 1300  Stop Time: 1350  Total Visit Time: 50 minutes

## 2023-04-27 ENCOUNTER — SOCIAL WORK (OUTPATIENT)
Dept: BEHAVIORAL/MENTAL HEALTH CLINIC | Facility: CLINIC | Age: 33
End: 2023-04-27

## 2023-04-27 DIAGNOSIS — F41.0 PANIC ATTACKS: ICD-10-CM

## 2023-04-27 DIAGNOSIS — F33.1 MODERATE RECURRENT MAJOR DEPRESSION (HCC): ICD-10-CM

## 2023-04-27 DIAGNOSIS — F90.0 ATTENTION DEFICIT HYPERACTIVITY DISORDER (ADHD), PREDOMINANTLY INATTENTIVE TYPE: ICD-10-CM

## 2023-04-27 DIAGNOSIS — F41.1 GENERALIZED ANXIETY DISORDER: Primary | ICD-10-CM
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
## 2023-04-27 NOTE — BH CRISIS PLAN
Client Name: Sagrario Jeffery       Client YOB: 1990  : 1990    Treatment Team (include name and contact information):     Healthcare Provider  Cachorro Lombardi, [address] [place] [address] [id number]    Type of Plan   * Child plans (children 15 yo and younger) must be completed and signed by the child's legal guardian   * Plans for all individuals 15 yo and above must be signed by the client  Plan Type: adolescent/adult (14 and over) Initial      My Personal Strengths are (in the client's own words):  I am outgoing, empathetic, emotionally intelligent, compassionate, strong willed, I can talk to anyone in any situation  I am not afraid of conflict, I do not allow others to take advantage of me  The stressors and triggers that may put me at risk are:  other (describe) Loss of those close to me through death or distance, feeling helpless in situations where I cannot help someone or get them to understand me,worrying too much about what others think of me or are doing when I am not around  Coping skills I can use to keep myself calm and safe: Other (describe) Breathing, music, gym, support system    Coping skills/supports I can use to maintain abstinence from substance use:   na    The people that provide me with help and support: (Include name, contact, and how they can help)   Support person #1: Deng    * Phone number:     * How can they help me? He is my best friend, a rock, we talk about everything   Support person #2: Tita    * Phone number:     * How can they help me? She is my sister, my blood and has been close to me since we were young  Her soft voice helps to calm me down  In the past, the following has helped me in times of crisis:    Other: books, podcasts, walks, gym, diet, friends         If it is an emergency and you need immediate help, call     If there is a possibility of danger to yourself or others, call the following crisis hotline resources:     Adult Crisis Numbers  Suicide Prevention Hotline - Dial   [facility]: [address]: [address]: [address]: [phone]  [address] ([facility]): [phone]  [facility]: [address]: [address] [place] [address] St: [phone] (daytime)  [phone] (after hours, weekends, holidays)     Child/Adolescent Crisis Numbers   [facility]: [address]: [phone]   Molly [phone]   [address] ([facility]): [phone]    Please note: Some [facility] do not have a separate number for Child/Adolescent specific crisis  If your county is not listed under Child/Adolescent, please call the adult number for your county     National Talk to Text Line   All Ages - [phone]    In the event your feelings become unmanageable, and you cannot reach your support system, you will call 911 immediately or go to the nearest hospital emergency room

## 2023-04-27 NOTE — BH TREATMENT PLAN
Outpatient [address] [facility]  1990     Date of Initial Psychotherapy Assessment:    Date of Current Treatment Plan: 04/27/23  Treatment Plan Target Date: 10/27/23  Treatment Plan Expiration Date: 10/27/23    Diagnosis:   1  Generalized anxiety disorder        2  Panic attacks        3  Moderate recurrent major depression ([facility] [address] )        4  Attention deficit hyperactivity disorder (ADHD), predominantly inattentive type            Area(s) of Need: I need continued guidance though the next steps of growth in all areas of my life while conquering my fears and weaknesses as they get in my way  Long Term Goal 1 (in the client's own words): I want to be a successful person mentally, spiritually, and physically  Stage of Change: Action    Target Date for completion: 10/27/23     Anticipated therapeutic modalities: Supportive Psychotherapy     People identified to complete this goal: Ling Flores, family, friends, coworkers      Objective 1: (identify the means of measuring success in meeting the objective): I will continue to surround myself with positive people, books, videos  Objective 2: (identify the means of measuring success in meeting the objective): I will continue to seek help and growth in my career by asking questions and looking for leadership opportunities  I am currently under the care of a [facility] psychiatric provider: yes    My [facility] psychiatric provider is: Dr Deedee Roberts    I am currently taking psychiatric medications: Yes, as prescribed    I feel that I will be ready for discharge from mental health care when I reach the following (measurable goal/objective): When I come to an understanding that I am in absolute control of my mind, body and soul       I have created my Crisis Plan and have been offered a copy of this plan    [address]: Diagnosis and Treatment Plan explained to [employer] [address] acknowledges an understanding of their diagnosis  Yaneth Fan agrees to this treatment plan  I have been offered a copy of this Treatment Plan  yes      Yaneth Fan, 1990, actively participated in the review and update of this treatment plan Yaneth Fan  provided verbal consent on 4/27/2023 at 11 AM  The treatment plan was transcribed into the "[employer] Record at a later time

## 2023-05-16 ENCOUNTER — SOCIAL WORK (OUTPATIENT)
Dept: BEHAVIORAL/MENTAL HEALTH CLINIC | Facility: CLINIC | Age: 33
End: 2023-05-16

## 2023-05-16 DIAGNOSIS — F41.1 GENERALIZED ANXIETY DISORDER: Primary | ICD-10-CM

## 2023-05-16 DIAGNOSIS — F41.0 PANIC ATTACKS: ICD-10-CM

## 2023-05-16 DIAGNOSIS — F90.0 ATTENTION DEFICIT HYPERACTIVITY DISORDER (ADHD), PREDOMINANTLY INATTENTIVE TYPE: ICD-10-CM

## 2023-05-16 DIAGNOSIS — F33.1 MODERATE RECURRENT MAJOR DEPRESSION (HCC): ICD-10-CM

## 2023-05-16 NOTE — PSYCH
"Behavioral Health Psychotherapy Progress Note    Psychotherapy Provided: Individual Psychotherapy     Encounter Diagnoses   Name Primary? • Generalized anxiety disorder Yes   • Panic attacks    • Moderate recurrent major depression (HCC)    • Attention deficit hyperactivity disorder (ADHD), predominantly inattentive type              Goals addressed in session: Goal 1     DATA: Santosh Vela spoke  today about his feelings re: having started a new fitness program two weeks ago in which he receives coaching and group support for the changes he (wants to) make in his life  Santosh Vela shared details re: the ongoing conflict with his paramour who recently informed him that she \"does not like who he is  \"  During this session, this clinician used the following therapeutic modalities: Cognitive Behavioral Therapy, Dialectical Behavior Therapy, Motivational Interviewing and Supportive Psychotherapy    Substance Abuse was not addressed during this session  If the client is diagnosed with a co-occurring substance use disorder, please indicate any changes in the frequency or amount of use:   Stage of change for addressing substance use diagnoses: No substance use/Not applicable    ASSESSMENT:  Deb Mcmanus presents with a Euthymic/ normal mood  He remains uncertain of his future as a result of the limited investment between he and  his paramour, however he presented today as stronger and more confident as a result of the changes he is making on himself       his affect is Normal range and intensity, which is congruent, with his mood and the content of the session  The client has not made progress on their goals  Deb Mcmanus presents with a minimal risk of suicide, minimal risk of self-harm, and minimal risk of harm to others  For any risk assessment that surpasses a \"low\" rating, a safety plan must be developed      A safety plan was indicated: no  If yes, describe in detail     PLAN: Between sessions, Deb Mcmanus will " continue to address his relationship issues while monitoring his anxiety   At the next session, the therapist will use Supportive Psychotherapy to address the above  Behavioral Health Treatment Plan and Discharge Planning: Kingsley Knight is aware of and agrees to continue to work on their treatment plan  They have identified and are working toward their discharge goals   yes    Visit start and stop times:    05/16/23  Start Time: 1300  Stop Time: 1350  Total Visit Time: 50 minutes

## 2023-06-12 ENCOUNTER — SOCIAL WORK (OUTPATIENT)
Dept: BEHAVIORAL/MENTAL HEALTH CLINIC | Facility: CLINIC | Age: 33
End: 2023-06-12
Payer: COMMERCIAL

## 2023-06-12 DIAGNOSIS — F90.0 ATTENTION DEFICIT HYPERACTIVITY DISORDER (ADHD), PREDOMINANTLY INATTENTIVE TYPE: ICD-10-CM

## 2023-06-12 DIAGNOSIS — F41.1 GENERALIZED ANXIETY DISORDER: Primary | ICD-10-CM

## 2023-06-12 DIAGNOSIS — F41.0 PANIC ATTACKS: ICD-10-CM

## 2023-06-12 DIAGNOSIS — F33.1 MODERATE RECURRENT MAJOR DEPRESSION (HCC): ICD-10-CM

## 2023-06-12 PROCEDURE — 90834 PSYTX W PT 45 MINUTES: CPT | Performed by: SOCIAL WORKER

## 2023-06-13 DIAGNOSIS — F41.0 PANIC ATTACKS: ICD-10-CM

## 2023-06-13 DIAGNOSIS — F41.1 GENERALIZED ANXIETY DISORDER: ICD-10-CM

## 2023-06-13 DIAGNOSIS — F33.1 MODERATE RECURRENT MAJOR DEPRESSION (HCC): ICD-10-CM

## 2023-06-13 RX ORDER — ALPRAZOLAM 0.5 MG/1
TABLET ORAL
Qty: 30 TABLET | Refills: 0 | Status: SHIPPED | OUTPATIENT
Start: 2023-06-13

## 2023-06-13 RX ORDER — FLUOXETINE HYDROCHLORIDE 20 MG/1
20 CAPSULE ORAL DAILY
Qty: 90 CAPSULE | Refills: 0 | Status: SHIPPED | OUTPATIENT
Start: 2023-06-13 | End: 2023-09-11

## 2023-06-13 NOTE — PSYCH
"Behavioral Health Psychotherapy Progress Note    Psychotherapy Provided: Individual Psychotherapy     Encounter Diagnoses   Name Primary? • Generalized anxiety disorder Yes   • Panic attacks    • Moderate recurrent major depression (HCC)    • Attention deficit hyperactivity disorder (ADHD), predominantly inattentive type        Goals addressed in session: Goal 1     DATA: Martha Miller spoke  today about his feelings re: having moved out of the home he had been living in with his paramour for the past 9 years  He indicated that he has been doing well and requested feedback re: a new female peer he has begun dating  During this session, this clinician used the following therapeutic modalities: Cognitive Behavioral Therapy, Dialectical Behavior Therapy, Motivational Interviewing and Supportive Psychotherapy    Substance Abuse was not addressed during this session  If the client is diagnosed with a co-occurring substance use disorder, please indicate any changes in the frequency or amount of use:   Stage of change for addressing substance use diagnoses: No substance use/Not applicable    ASSESSMENT:  Shiloh Moss presents with a Anxious mood  He has not taken time to process the loss, relocation and already become emotionally involved with a new female who \"wants to take it slow(er)  \" This news was distressing for him to hear and he is uncertain \"what this means  \"    his affect is Normal range and intensity, which is congruent, with his mood and the content of the session  The client has not made progress on their goals  Shiloh Moss presents with a minimal risk of suicide, minimal risk of self-harm, and minimal risk of harm to others  For any risk assessment that surpasses a \"low\" rating, a safety plan must be developed  A safety plan was indicated: no  If yes, describe in detail     PLAN: Between sessions, Shiloh Moss will continue to address his relationship issues while managing his anxiety    At the " next session, the therapist will use Supportive Psychotherapy to address the above  Behavioral Health Treatment Plan and Discharge Planning: Betsy Pacheco is aware of and agrees to continue to work on their treatment plan  They have identified and are working toward their discharge goals   yes    Visit start and stop times:    6/12/23    Start Time: 1600  Stop Time: 1650  Total Visit Time: 50 minutes

## 2023-06-13 NOTE — PROGRESS NOTES
Will send a refill of Fluoxetine and Xanax covering for Dr Ashley FOSTER PDMP system checked- filling scripts appropriately

## 2023-06-29 ENCOUNTER — TELEPHONE (OUTPATIENT)
Dept: PSYCHIATRY | Facility: CLINIC | Age: 33
End: 2023-06-29

## 2023-07-12 ENCOUNTER — SOCIAL WORK (OUTPATIENT)
Dept: BEHAVIORAL/MENTAL HEALTH CLINIC | Facility: CLINIC | Age: 33
End: 2023-07-12
Payer: COMMERCIAL

## 2023-07-12 DIAGNOSIS — F33.1 MODERATE RECURRENT MAJOR DEPRESSION (HCC): ICD-10-CM

## 2023-07-12 DIAGNOSIS — F41.1 GENERALIZED ANXIETY DISORDER: Primary | ICD-10-CM

## 2023-07-12 DIAGNOSIS — F41.0 PANIC ATTACKS: ICD-10-CM

## 2023-07-12 DIAGNOSIS — F90.0 ATTENTION DEFICIT HYPERACTIVITY DISORDER (ADHD), PREDOMINANTLY INATTENTIVE TYPE: ICD-10-CM

## 2023-07-12 PROCEDURE — 90834 PSYTX W PT 45 MINUTES: CPT | Performed by: SOCIAL WORKER

## 2023-07-12 NOTE — PSYCH
Behavioral Health Psychotherapy Progress Note    Psychotherapy Provided: Individual Psychotherapy     Encounter Diagnoses   Name Primary? • Generalized anxiety disorder Yes   • Panic attacks    • Moderate recurrent major depression (HCC)    • Attention deficit hyperactivity disorder (ADHD), predominantly inattentive type        Goals addressed in session: Goal 1     DATA: Abdiel Thorne spoke  today about his feelings of angst and loss re: having moved out of his home, ended his relationship with his long-time paramour, and assisted his parents in relocating out of state. He discussed how much he is enjoying the new female he is dating while also uncertain if he already "messed things up."    During this session, this clinician used the following therapeutic modalities: Cognitive Behavioral Therapy, Dialectical Behavior Therapy, Motivational Interviewing and Supportive Psychotherapy    Substance Abuse was not addressed during this session. If the client is diagnosed with a co-occurring substance use disorder, please indicate any changes in the frequency or amount of use: . Stage of change for addressing substance use diagnoses: No substance use/Not applicable    ASSESSMENT:  Delta Magdaleno presents with a Anxious and Depressed mood. He has not taken time to process the loss, relocation and already become emotionally involved with a new female who "wants to take it slow(er)." This news remains distressing for him to hear and he is uncertain "what this means."    his affect is Normal range and intensity, which is congruent, with his mood and the content of the session. The client has not made progress on their goals. Delta Magdaleno presents with a minimal risk of suicide, minimal risk of self-harm, and minimal risk of harm to others. For any risk assessment that surpasses a "low" rating, a safety plan must be developed.     A safety plan was indicated: no  If yes, describe in detail     PLAN: Between sessions, Abdiel Thorne Sb will continue to address his relationship issues while managing his anxiety . At the next session, the therapist will use Supportive Psychotherapy to address the above. Behavioral Health Treatment Plan and Discharge Planning: La Stallings is aware of and agrees to continue to work on their treatment plan. They have identified and are working toward their discharge goals.  yes    Visit start and stop times:    6/12/23    Start Time: 0900  Stop Time: 0950  Total Visit Time: 50 minutes

## 2023-07-20 ENCOUNTER — SOCIAL WORK (OUTPATIENT)
Dept: BEHAVIORAL/MENTAL HEALTH CLINIC | Facility: CLINIC | Age: 33
End: 2023-07-20
Payer: COMMERCIAL

## 2023-07-20 DIAGNOSIS — F90.0 ATTENTION DEFICIT HYPERACTIVITY DISORDER (ADHD), PREDOMINANTLY INATTENTIVE TYPE: ICD-10-CM

## 2023-07-20 DIAGNOSIS — F33.1 MODERATE RECURRENT MAJOR DEPRESSION (HCC): ICD-10-CM

## 2023-07-20 DIAGNOSIS — F41.1 GENERALIZED ANXIETY DISORDER: Primary | ICD-10-CM

## 2023-07-20 DIAGNOSIS — F41.0 PANIC ATTACKS: ICD-10-CM

## 2023-07-20 PROCEDURE — 90834 PSYTX W PT 45 MINUTES: CPT | Performed by: SOCIAL WORKER

## 2023-07-20 NOTE — PSYCH
Behavioral Health Psychotherapy Progress Note    Psychotherapy Provided: Individual Psychotherapy     Encounter Diagnoses   Name Primary? • Generalized anxiety disorder Yes   • Panic attacks    • Moderate recurrent major depression (HCC)    • Attention deficit hyperactivity disorder (ADHD), predominantly inattentive type        Goals addressed in session: Goal 1     DATA: Jose Teresa spoke  today about his feelings re: the relationship he has become involved in while adjusting to his own, his parents relocations. During this session, this clinician used the following therapeutic modalities: Cognitive Behavioral Therapy, Dialectical Behavior Therapy, Motivational Interviewing and Supportive Psychotherapy    Substance Abuse was not addressed during this session. If the client is diagnosed with a co-occurring substance use disorder, please indicate any changes in the frequency or amount of use: . Stage of change for addressing substance use diagnoses: No substance use/Not applicable    ASSESSMENT:  Lola Oneal presents with a Anxious and Depressed mood. Jose Teresa is overwhelmed with the numerous recent changes in his life. He has limited social supports and attending more frequent therapy sessions at this time is most helpful for him. his affect is Normal range and intensity, which is congruent, with his mood and the content of the session. The client has not made progress on their goals. Lola Oneal presents with a minimal risk of suicide, minimal risk of self-harm, and minimal risk of harm to others. For any risk assessment that surpasses a "low" rating, a safety plan must be developed. A safety plan was indicated: no  If yes, describe in detail     PLAN: Between sessions, Lola Oneal will continue to address his relationship issues while managing his anxiety . At the next session, the therapist will use Supportive Psychotherapy to address the above.     Behavioral Health Treatment Plan and Discharge Planning: Rosanna Rachel is aware of and agrees to continue to work on their treatment plan. They have identified and are working toward their discharge goals.  yes    Visit start and stop times:    6/12/23    Start Time: 1100  Stop Time: 1150  Total Visit Time: 50 minutes

## 2023-08-08 ENCOUNTER — SOCIAL WORK (OUTPATIENT)
Dept: BEHAVIORAL/MENTAL HEALTH CLINIC | Facility: CLINIC | Age: 33
End: 2023-08-08
Payer: COMMERCIAL

## 2023-08-08 DIAGNOSIS — F41.1 GENERALIZED ANXIETY DISORDER: Primary | ICD-10-CM

## 2023-08-08 DIAGNOSIS — F41.0 PANIC ATTACKS: ICD-10-CM

## 2023-08-08 DIAGNOSIS — F90.0 ATTENTION DEFICIT HYPERACTIVITY DISORDER (ADHD), PREDOMINANTLY INATTENTIVE TYPE: ICD-10-CM

## 2023-08-08 DIAGNOSIS — F33.1 MODERATE RECURRENT MAJOR DEPRESSION (HCC): ICD-10-CM

## 2023-08-08 PROCEDURE — 90834 PSYTX W PT 45 MINUTES: CPT | Performed by: SOCIAL WORKER

## 2023-08-08 NOTE — PSYCH
Behavioral Health Psychotherapy Progress Note    Psychotherapy Provided: Individual Psychotherapy     Encounter Diagnoses   Name Primary? • Generalized anxiety disorder Yes   • Panic attacks    • Moderate recurrent major depression (HCC)    • Attention deficit hyperactivity disorder (ADHD), predominantly inattentive type        Goals addressed in session: Goal 1     DATA: Imani Clayton spoke further today about his feelings re: the relationship he has become involved in while adjusting to his own, his parents relocations. He shared ways that they are learning to communicate with each other in ways that they both need. During this session, this clinician used the following therapeutic modalities: Cognitive Behavioral Therapy, Dialectical Behavior Therapy, Motivational Interviewing and Supportive Psychotherapy    Substance Abuse was not addressed during this session. If the client is diagnosed with a co-occurring substance use disorder, please indicate any changes in the frequency or amount of use: . Stage of change for addressing substance use diagnoses: No substance use/Not applicable    ASSESSMENT:  Bisi Galvan presents with a Euthymic/ normal mood. Imani Clayton acknowledged that his anxiety increase "when things with his relationship are not good," while also noting that he has not been as focused on his self care since meeting [facility]. his affect is Normal range and intensity, which is congruent, with his mood and the content of the session. The client has not made progress on their goals. Bisi Galvan presents with a minimal risk of suicide, minimal risk of self-harm, and minimal risk of harm to others. For any risk assessment that surpasses a "low" rating, a safety plan must be developed. A safety plan was indicated: no  If yes, describe in detail     PLAN: Between sessions, Bisi Galvan will continue to address his relationship issues while managing his anxiety .  At the next session, the therapist will use Supportive Psychotherapy to address the above. Behavioral Health Treatment Plan and Discharge Planning: Edithsreedhar Titabrandt is aware of and agrees to continue to work on their treatment plan. They have identified and are working toward their discharge goals.  yes    Visit start and stop times:    6/12/23    Start Time: 1400  Stop Time: 1450  Total Visit Time: 50 minutes
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
## 2023-08-22 ENCOUNTER — OFFICE VISIT (OUTPATIENT)
Dept: PSYCHIATRY | Facility: CLINIC | Age: 33
End: 2023-08-22
Payer: COMMERCIAL

## 2023-08-22 DIAGNOSIS — F51.04 PSYCHOPHYSIOLOGICAL INSOMNIA: ICD-10-CM

## 2023-08-22 DIAGNOSIS — F41.1 GENERALIZED ANXIETY DISORDER: Primary | ICD-10-CM

## 2023-08-22 DIAGNOSIS — F33.1 MODERATE RECURRENT MAJOR DEPRESSION (HCC): ICD-10-CM

## 2023-08-22 DIAGNOSIS — F41.0 PANIC ATTACKS: ICD-10-CM

## 2023-08-22 PROCEDURE — 90833 PSYTX W PT W E/M 30 MIN: CPT | Performed by: STUDENT IN AN ORGANIZED HEALTH CARE EDUCATION/TRAINING PROGRAM

## 2023-08-22 PROCEDURE — 99214 OFFICE O/P EST MOD 30 MIN: CPT | Performed by: STUDENT IN AN ORGANIZED HEALTH CARE EDUCATION/TRAINING PROGRAM

## 2023-08-22 RX ORDER — ALPRAZOLAM 0.5 MG/1
TABLET ORAL
Qty: 30 TABLET | Refills: 0 | Status: SHIPPED | OUTPATIENT
Start: 2023-08-22

## 2023-08-22 RX ORDER — FLUOXETINE HYDROCHLORIDE 40 MG/1
40 CAPSULE ORAL DAILY
Qty: 90 CAPSULE | Refills: 1 | Status: SHIPPED | OUTPATIENT
Start: 2023-08-22

## 2023-08-23 ENCOUNTER — TELEPHONE (OUTPATIENT)
Dept: PSYCHIATRY | Facility: CLINIC | Age: 33
End: 2023-08-23

## 2023-09-06 ENCOUNTER — SOCIAL WORK (OUTPATIENT)
Dept: BEHAVIORAL/MENTAL HEALTH CLINIC | Facility: CLINIC | Age: 33
End: 2023-09-06
Payer: COMMERCIAL

## 2023-09-06 DIAGNOSIS — F41.0 PANIC ATTACKS: ICD-10-CM

## 2023-09-06 DIAGNOSIS — F41.1 GENERALIZED ANXIETY DISORDER: Primary | ICD-10-CM

## 2023-09-06 DIAGNOSIS — F33.1 MODERATE RECURRENT MAJOR DEPRESSION (HCC): ICD-10-CM

## 2023-09-06 DIAGNOSIS — F90.0 ATTENTION DEFICIT HYPERACTIVITY DISORDER (ADHD), PREDOMINANTLY INATTENTIVE TYPE: ICD-10-CM

## 2023-09-06 PROCEDURE — 90834 PSYTX W PT 45 MINUTES: CPT | Performed by: SOCIAL WORKER

## 2023-09-07 NOTE — PSYCH
Behavioral Health Psychotherapy Progress Note    Psychotherapy Provided: Individual Psychotherapy     Encounter Diagnoses   Name Primary? • Generalized anxiety disorder Yes   • Panic attacks    • Moderate recurrent major depression (HCC)    • Attention deficit hyperactivity disorder (ADHD), predominantly inattentive type        Goals addressed in session: Goal 1     DATA: Fabián Hooper spoke further today about his feelings re: the relationship he had become involved in while also sharing his increased anxiety. During this session, this clinician used the following therapeutic modalities: Cognitive Behavioral Therapy, Dialectical Behavior Therapy, Motivational Interviewing and Supportive Psychotherapy    Substance Abuse was not addressed during this session. If the client is diagnosed with a co-occurring substance use disorder, please indicate any changes in the frequency or amount of use: . Stage of change for addressing substance use diagnoses: No substance use/Not applicable    ASSESSMENT:  Laura Pope presents with a Euthymic/ normal mood. Fabián Hooper acknowledged that his anxiety remains difficult to manage  "when things with his relationship are not good," while also again admitting that he has not been as focused on his self care since meeting [facility]. his affect is Normal range and intensity, which is congruent, with his mood and the content of the session. The client has not made progress on their goals. Laura Pope presents with a minimal risk of suicide, minimal risk of self-harm, and minimal risk of harm to others. For any risk assessment that surpasses a "low" rating, a safety plan must be developed. A safety plan was indicated: no  If yes, describe in detail     PLAN: Between sessions, Laura Pope will continue to address his relationship issues while managing his anxiety . At the next session, the therapist will use Supportive Psychotherapy to address the above.     Behavioral Health Treatment Plan and Discharge Planning: Shelley Laureano is aware of and agrees to continue to work on their treatment plan. They have identified and are working toward their discharge goals.  yes      Visit start and stop times:    09/06/23    Start Time: 1400  Stop Time: 1450  Total Visit Time: 50 minutes    Start Time: 1400  Stop Time: 1450  Total Visit Time: 50 minutes

## 2023-09-25 ENCOUNTER — SOCIAL WORK (OUTPATIENT)
Dept: BEHAVIORAL/MENTAL HEALTH CLINIC | Facility: CLINIC | Age: 33
End: 2023-09-25
Payer: COMMERCIAL

## 2023-09-25 DIAGNOSIS — F33.1 MODERATE RECURRENT MAJOR DEPRESSION (HCC): ICD-10-CM

## 2023-09-25 DIAGNOSIS — F41.0 PANIC ATTACKS: ICD-10-CM

## 2023-09-25 DIAGNOSIS — F90.0 ATTENTION DEFICIT HYPERACTIVITY DISORDER (ADHD), PREDOMINANTLY INATTENTIVE TYPE: ICD-10-CM

## 2023-09-25 DIAGNOSIS — F41.1 GENERALIZED ANXIETY DISORDER: Primary | ICD-10-CM

## 2023-09-25 PROCEDURE — 90834 PSYTX W PT 45 MINUTES: CPT | Performed by: SOCIAL WORKER

## 2023-09-25 NOTE — PSYCH
Behavioral Health Psychotherapy Progress Note    Psychotherapy Provided: Individual Psychotherapy     Encounter Diagnoses   Name Primary? • Generalized anxiety disorder Yes   • Panic attacks    • Moderate recurrent major depression (HCC)    • Attention deficit hyperactivity disorder (ADHD), predominantly inattentive type        Goals addressed in session: Goal 1     DATA: July Richardson spoke today about his feelings re: the decision that he regrettably made to spend the weekend away with his ex-paramour. He vocalized today how difficulty this trip was and how glad he is to be home. During this session, this clinician used the following therapeutic modalities: Cognitive Behavioral Therapy, Dialectical Behavior Therapy, Motivational Interviewing and Supportive Psychotherapy    Substance Abuse was not addressed during this session. If the client is diagnosed with a co-occurring substance use disorder, please indicate any changes in the frequency or amount of use: . Stage of change for addressing substance use diagnoses: No substance use/Not applicable    ASSESSMENT:  Bao Pinon presents with a Euthymic/ normal mood. July Richardson acknowledged that his friends were extremely supportive throughout this difficult experience and he is grateful to them. He is also pleased about receiving a promotion at work and ready to begin looking for a place of his own.    his affect is Normal range and intensity, which is congruent, with his mood and the content of the session. The client has not made progress on their goals. Bao Pinon presents with a minimal risk of suicide, minimal risk of self-harm, and minimal risk of harm to others. For any risk assessment that surpasses a "low" rating, a safety plan must be developed. A safety plan was indicated: no  If yes, describe in detail     PLAN: Between sessions, Bao Pinon will continue to address his relationship issues while managing his anxiety .  At the next session, the therapist will use Supportive Psychotherapy to address the above. Behavioral Health Treatment Plan and Discharge Planning: Margarita Pasquale is aware of and agrees to continue to work on their treatment plan. They have identified and are working toward their discharge goals.  yes      Visit start and stop times:    09/06/23    Start Time: 1500  Stop Time: 1550  Total Visit Time: 50 minutes    Start Time: 1500  Stop Time: 1550  Total Visit Time: 50 minutes

## 2023-10-09 NOTE — PSYCH
Bed: JNED-06  Expected date: 10/8/23  Expected time: 9:15 PM  Means of arrival: Ambulance  Comments:  Maht abd pain     Psychotherapy Provided: Individual Psychotherapy 50 minutes     Length of time in session: 50 minutes, follow up in 2 week    Goals addressed in session: Goal 1 and Goal 2     Pain:      none    0    Current suicide risk : Low     D:Amrik spoke further about his feelings of frustration with how others handle situations differently than he would (ie  His sister)  A:  Ti Galindo appears to lack insight into how he may come across to others in his attempts to provide feedback to them resulting in alienation and defensiveness which results in hurt feelings on both sides  He remains somewhat resistant to feedback at this time  P:  Upcoming sessions will be used to support him with the above  Behavioral Health Treatment Plan [facility]: Diagnosis and Treatment Plan explained to Neida Giles relates understanding diagnosis and is agreeable to Treatment Plan   Yes

## 2023-10-11 ENCOUNTER — TELEPHONE (OUTPATIENT)
Dept: PSYCHIATRY | Facility: CLINIC | Age: 33
End: 2023-10-11

## 2023-12-08 ENCOUNTER — TELEPHONE (OUTPATIENT)
Dept: PSYCHIATRY | Facility: CLINIC | Age: 33
End: 2023-12-08

## 2023-12-08 NOTE — TELEPHONE ENCOUNTER
The patient called in to verify if he had a refill left on his Prozac 40 mg capsules. Upon review, the writer noticed a script for the medication was sent to the pharmacy on 8/22/23 with one refill. The writer informed the patient that there should be one refill left and advised him to follow up with the pharmacy.

## 2024-01-02 ENCOUNTER — DOCUMENTATION (OUTPATIENT)
Dept: BEHAVIORAL/MENTAL HEALTH CLINIC | Facility: CLINIC | Age: 34
End: 2024-01-02

## 2024-01-08 ENCOUNTER — TELEMEDICINE (OUTPATIENT)
Dept: PSYCHIATRY | Facility: CLINIC | Age: 34
End: 2024-01-08
Payer: COMMERCIAL

## 2024-01-08 DIAGNOSIS — F90.0 ATTENTION DEFICIT HYPERACTIVITY DISORDER (ADHD), PREDOMINANTLY INATTENTIVE TYPE: ICD-10-CM

## 2024-01-08 DIAGNOSIS — F33.1 MODERATE RECURRENT MAJOR DEPRESSION (HCC): Primary | ICD-10-CM

## 2024-01-08 DIAGNOSIS — F41.1 GENERALIZED ANXIETY DISORDER: ICD-10-CM

## 2024-01-08 PROCEDURE — 90833 PSYTX W PT W E/M 30 MIN: CPT | Performed by: STUDENT IN AN ORGANIZED HEALTH CARE EDUCATION/TRAINING PROGRAM

## 2024-01-08 PROCEDURE — 99214 OFFICE O/P EST MOD 30 MIN: CPT | Performed by: STUDENT IN AN ORGANIZED HEALTH CARE EDUCATION/TRAINING PROGRAM

## 2024-01-08 RX ORDER — FLUOXETINE HYDROCHLORIDE 20 MG/1
20 CAPSULE ORAL DAILY
Qty: 30 CAPSULE | Refills: 0 | Status: SHIPPED | OUTPATIENT
Start: 2024-01-08 | End: 2024-01-18

## 2024-01-08 RX ORDER — FLUOXETINE 10 MG/1
10 CAPSULE ORAL DAILY
Qty: 30 CAPSULE | Refills: 0 | Status: SHIPPED | OUTPATIENT
Start: 2024-01-08 | End: 2024-01-18
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
## 2024-01-30 ENCOUNTER — DOCUMENTATION (OUTPATIENT)
Dept: BEHAVIORAL/MENTAL HEALTH CLINIC | Facility: CLINIC | Age: 34
End: 2024-01-30

## 2024-01-30 NOTE — PROGRESS NOTES
Treatment Plan Tracking    # 1Treatment Plan not completed within required time limits due to: Amrik Basurto no showed appointment 01/30/24.

## 2024-01-31 ENCOUNTER — TELEPHONE (OUTPATIENT)
Dept: PSYCHIATRY | Facility: CLINIC | Age: 34
End: 2024-01-31

## 2024-02-14 ENCOUNTER — TELEMEDICINE (OUTPATIENT)
Dept: BEHAVIORAL/MENTAL HEALTH CLINIC | Facility: CLINIC | Age: 34
End: 2024-02-14
Payer: COMMERCIAL

## 2024-02-14 DIAGNOSIS — F41.1 GENERALIZED ANXIETY DISORDER: Primary | ICD-10-CM

## 2024-02-14 DIAGNOSIS — F90.0 ATTENTION DEFICIT HYPERACTIVITY DISORDER (ADHD), PREDOMINANTLY INATTENTIVE TYPE: ICD-10-CM

## 2024-02-14 DIAGNOSIS — F41.0 PANIC ATTACKS: ICD-10-CM

## 2024-02-14 DIAGNOSIS — F33.1 MODERATE RECURRENT MAJOR DEPRESSION (HCC): ICD-10-CM

## 2024-02-14 PROCEDURE — 90834 PSYTX W PT 45 MINUTES: CPT | Performed by: SOCIAL WORKER

## 2024-02-14 RX ORDER — FLUOXETINE 10 MG/1
10 CAPSULE ORAL DAILY
Qty: 90 CAPSULE | Refills: 2 | Status: SHIPPED | OUTPATIENT
Start: 2024-02-14 | End: 2024-03-04

## 2024-02-14 RX ORDER — FLUOXETINE HYDROCHLORIDE 20 MG/1
20 CAPSULE ORAL DAILY
Qty: 90 CAPSULE | Refills: 1 | Status: SHIPPED | OUTPATIENT
Start: 2024-02-14 | End: 2024-03-04 | Stop reason: SDUPTHER

## 2024-02-14 NOTE — BH TREATMENT PLAN
Outpatient Behavioral Health Psychotherapy Treatment Plan    Amrik Villelajanette  1990     Date of Initial Psychotherapy Assessment:    Date of Current Treatment Plan: 02/14/24  Treatment Plan Target Date: 8/14/24  Treatment Plan Expiration Date: 8/14/24    Diagnosis:   1. Generalized anxiety disorder        2. Panic attacks        3. Moderate recurrent major depression (HCC)        4. Attention deficit hyperactivity disorder (ADHD), predominantly inattentive type              Area(s) of Need: I do not get what I expect from others.  I have not been showing up for myself.     Long Term Goal 1 (in the client's own words): I want to figure out my new life.    Stage of Change: Action    Target Date for completion: 8/14/24     Anticipated therapeutic modalities: Supportive Psychotherapy     People identified to complete this goal: Celsa, family, friends, coworkers      Objective 1: (identify the means of measuring success in meeting the objective): I will return to the gym and to reading books.        Objective 2: (identify the means of measuring success in meeting the objective): I will  seek feedback and accountability, particularly within the context of therapy.          I am currently under the care of a [facility] psychiatric provider: yes    My [facility] psychiatric provider is: Dr. Washburn    I am currently taking psychiatric medications: Yes, as prescribed    I feel that I will be ready for discharge from mental health care when I reach the following (measurable goal/objective): When I come to an understanding that I am in absolute control of my mind, body and soul.     I have created my Crisis Plan and have been offered a copy of this plan    Behavioral Health Treatment Plan [facility]: Diagnosis and Treatment Plan explained to Amrik Sb Amrik Sb acknowledges an understanding of their diagnosis. Amrik Sb agrees to this treatment plan.    I have been offered a copy of this Treatment Plan.  yes      Amrik Basurto, 1990, actively participated in the review and update of this treatment plan Amrik Sb  provided verbal consent on 2/14/2024 at 11 AM. The treatment plan was transcribed into the Electronic Health Record at a later time.

## 2024-02-14 NOTE — PSYCH
"Behavioral Health Psychotherapy Progress Note    Psychotherapy Provided: Individual Psychotherapy     Encounter Diagnoses   Name Primary?   • Generalized anxiety disorder Yes   • Panic attacks    • Moderate recurrent major depression (HCC)    • Attention deficit hyperactivity disorder (ADHD), predominantly inattentive type          Goals addressed in session: Goal 1     DATA: Amrik spoke today about his feelings re: the decision that he regrettably made to spend the weekend away with his ex-paramour.  He vocalized today how difficulty this trip was and how glad he is to be home.   During this session, this clinician used the following therapeutic modalities: Cognitive Behavioral Therapy, Dialectical Behavior Therapy, Motivational Interviewing and Supportive Psychotherapy    Substance Abuse was not addressed during this session. If the client is diagnosed with a co-occurring substance use disorder, please indicate any changes in the frequency or amount of use: . Stage of change for addressing substance use diagnoses: No substance use/Not applicable    ASSESSMENT:  Amrik Basurto presents with a Euthymic/ normal mood. Amrik acknowledged that his friends were extremely supportive throughout this difficult experience and he is grateful to them.  He is also pleased about receiving a promotion at work and ready to begin looking for a place of his own.    his affect is Normal range and intensity, which is congruent, with his mood and the content of the session. The client has not made progress on their goals.     Amrik Basurto presents with a minimal risk of suicide, minimal risk of self-harm, and minimal risk of harm to others.    For any risk assessment that surpasses a \"low\" rating, a safety plan must be developed.    A safety plan was indicated: no  If yes, describe in detail     PLAN: Between sessions, Amrik Basurto will continue to address his relationship issues while managing his anxiety . At the next session, " the therapist will use Supportive Psychotherapy to address the above.    Behavioral Health Treatment Plan and Discharge Planning: Amrik Sb is aware of and agrees to continue to work on their treatment plan. They have identified and are working toward their discharge goals. yes      Visit start and stop times:    2/14/24    Start Time: 0900  Stop Time: 0950  Total Visit Time: 50 minutes    Start Time: 0900  Stop Time: 0950  Total Visit Time: 50 minutes
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
## 2024-02-21 PROBLEM — H10.9 CONJUNCTIVITIS: Status: RESOLVED | Noted: 2019-08-12 | Resolved: 2024-02-21

## 2024-02-21 PROBLEM — H10.13 ALLERGIC CONJUNCTIVITIS OF BOTH EYES: Status: RESOLVED | Noted: 2019-05-15 | Resolved: 2024-02-21

## 2024-03-04 ENCOUNTER — TELEMEDICINE (OUTPATIENT)
Dept: BEHAVIORAL/MENTAL HEALTH CLINIC | Facility: CLINIC | Age: 34
End: 2024-03-04
Payer: COMMERCIAL

## 2024-03-04 DIAGNOSIS — F33.1 MODERATE RECURRENT MAJOR DEPRESSION (HCC): ICD-10-CM

## 2024-03-04 DIAGNOSIS — F41.0 PANIC ATTACKS: ICD-10-CM

## 2024-03-04 DIAGNOSIS — F41.1 GENERALIZED ANXIETY DISORDER: Primary | ICD-10-CM

## 2024-03-04 DIAGNOSIS — F41.1 GENERALIZED ANXIETY DISORDER: ICD-10-CM

## 2024-03-04 DIAGNOSIS — F90.0 ATTENTION DEFICIT HYPERACTIVITY DISORDER (ADHD), PREDOMINANTLY INATTENTIVE TYPE: ICD-10-CM

## 2024-03-04 PROCEDURE — 90834 PSYTX W PT 45 MINUTES: CPT | Performed by: SOCIAL WORKER

## 2024-03-04 RX ORDER — FLUOXETINE HYDROCHLORIDE 20 MG/1
20 CAPSULE ORAL DAILY
Qty: 90 CAPSULE | Refills: 1 | Status: SHIPPED | OUTPATIENT
Start: 2024-03-04
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
## 2024-03-26 ENCOUNTER — SOCIAL WORK (OUTPATIENT)
Dept: BEHAVIORAL/MENTAL HEALTH CLINIC | Facility: CLINIC | Age: 34
End: 2024-03-26
Payer: COMMERCIAL

## 2024-03-26 DIAGNOSIS — F33.1 MODERATE RECURRENT MAJOR DEPRESSION (HCC): ICD-10-CM

## 2024-03-26 DIAGNOSIS — F41.0 PANIC ATTACKS: ICD-10-CM

## 2024-03-26 DIAGNOSIS — F41.1 GENERALIZED ANXIETY DISORDER: Primary | ICD-10-CM

## 2024-03-26 DIAGNOSIS — F90.0 ATTENTION DEFICIT HYPERACTIVITY DISORDER (ADHD), PREDOMINANTLY INATTENTIVE TYPE: ICD-10-CM

## 2024-03-26 PROCEDURE — 90837 PSYTX W PT 60 MINUTES: CPT | Performed by: SOCIAL WORKER
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
## 2024-04-18 ENCOUNTER — SOCIAL WORK (OUTPATIENT)
Dept: BEHAVIORAL/MENTAL HEALTH CLINIC | Facility: CLINIC | Age: 34
End: 2024-04-18

## 2024-04-18 DIAGNOSIS — F41.1 GENERALIZED ANXIETY DISORDER: Primary | ICD-10-CM

## 2024-04-18 DIAGNOSIS — F41.0 PANIC ATTACKS: ICD-10-CM

## 2024-04-18 DIAGNOSIS — F33.1 MODERATE RECURRENT MAJOR DEPRESSION (HCC): ICD-10-CM

## 2024-04-18 DIAGNOSIS — F90.0 ATTENTION DEFICIT HYPERACTIVITY DISORDER (ADHD), PREDOMINANTLY INATTENTIVE TYPE: ICD-10-CM
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
## 2024-05-07 ENCOUNTER — TELEMEDICINE (OUTPATIENT)
Dept: PSYCHIATRY | Facility: CLINIC | Age: 34
End: 2024-05-07
Payer: COMMERCIAL

## 2024-05-07 DIAGNOSIS — F33.41 RECURRENT MAJOR DEPRESSIVE DISORDER, IN PARTIAL REMISSION (HCC): ICD-10-CM

## 2024-05-07 DIAGNOSIS — F41.0 PANIC ATTACKS: ICD-10-CM

## 2024-05-07 DIAGNOSIS — F41.1 GENERALIZED ANXIETY DISORDER: Primary | ICD-10-CM

## 2024-05-07 PROBLEM — F33.9 RECURRENT MAJOR DEPRESSIVE DISORDER (HCC): Status: ACTIVE | Noted: 2019-11-06

## 2024-05-07 PROCEDURE — 99214 OFFICE O/P EST MOD 30 MIN: CPT | Performed by: STUDENT IN AN ORGANIZED HEALTH CARE EDUCATION/TRAINING PROGRAM

## 2024-05-07 PROCEDURE — 90833 PSYTX W PT W E/M 30 MIN: CPT | Performed by: STUDENT IN AN ORGANIZED HEALTH CARE EDUCATION/TRAINING PROGRAM

## 2024-05-08 ENCOUNTER — TELEPHONE (OUTPATIENT)
Dept: PSYCHIATRY | Facility: CLINIC | Age: 34
End: 2024-05-08

## 2024-05-08 NOTE — TELEPHONE ENCOUNTER
Called and left message for patient to return a call to [phone] and schedule 4 month VIRTUAL follow up with provider (week of 9/7). Please schedule upon return call. Thank you.

## 2024-05-10 ENCOUNTER — TELEPHONE (OUTPATIENT)
Dept: PSYCHIATRY | Facility: CLINIC | Age: 34
End: 2024-05-10

## 2024-05-30 ENCOUNTER — SOCIAL WORK (OUTPATIENT)
Dept: BEHAVIORAL/MENTAL HEALTH CLINIC | Facility: CLINIC | Age: 34
End: 2024-05-30

## 2024-05-30 DIAGNOSIS — F33.1 MODERATE RECURRENT MAJOR DEPRESSION (HCC): ICD-10-CM

## 2024-05-30 DIAGNOSIS — F41.0 PANIC ATTACKS: ICD-10-CM

## 2024-05-30 DIAGNOSIS — F41.1 GENERALIZED ANXIETY DISORDER: Primary | ICD-10-CM

## 2024-05-30 DIAGNOSIS — F90.0 ATTENTION DEFICIT HYPERACTIVITY DISORDER (ADHD), PREDOMINANTLY INATTENTIVE TYPE: ICD-10-CM
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
## 2024-06-20 ENCOUNTER — SOCIAL WORK (OUTPATIENT)
Dept: BEHAVIORAL/MENTAL HEALTH CLINIC | Facility: CLINIC | Age: 34
End: 2024-06-20
Payer: COMMERCIAL

## 2024-06-20 DIAGNOSIS — F33.1 MODERATE RECURRENT MAJOR DEPRESSION (HCC): ICD-10-CM

## 2024-06-20 DIAGNOSIS — F90.0 ATTENTION DEFICIT HYPERACTIVITY DISORDER (ADHD), PREDOMINANTLY INATTENTIVE TYPE: ICD-10-CM

## 2024-06-20 DIAGNOSIS — F41.1 GENERALIZED ANXIETY DISORDER: Primary | ICD-10-CM

## 2024-06-20 PROCEDURE — 90834 PSYTX W PT 45 MINUTES: CPT | Performed by: SOCIAL WORKER
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
## 2024-07-11 ENCOUNTER — SOCIAL WORK (OUTPATIENT)
Dept: BEHAVIORAL/MENTAL HEALTH CLINIC | Facility: CLINIC | Age: 34
End: 2024-07-11
Payer: COMMERCIAL

## 2024-07-11 DIAGNOSIS — F33.1 MODERATE RECURRENT MAJOR DEPRESSION (HCC): ICD-10-CM

## 2024-07-11 DIAGNOSIS — F90.0 ATTENTION DEFICIT HYPERACTIVITY DISORDER (ADHD), PREDOMINANTLY INATTENTIVE TYPE: ICD-10-CM

## 2024-07-11 DIAGNOSIS — F41.1 GENERALIZED ANXIETY DISORDER: Primary | ICD-10-CM

## 2024-07-11 PROCEDURE — 90837 PSYTX W PT 60 MINUTES: CPT | Performed by: SOCIAL WORKER
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
## 2024-08-01 ENCOUNTER — SOCIAL WORK (OUTPATIENT)
Dept: BEHAVIORAL/MENTAL HEALTH CLINIC | Facility: CLINIC | Age: 34
End: 2024-08-01
Payer: COMMERCIAL

## 2024-08-01 DIAGNOSIS — F41.1 GENERALIZED ANXIETY DISORDER: Primary | ICD-10-CM

## 2024-08-01 DIAGNOSIS — F90.0 ATTENTION DEFICIT HYPERACTIVITY DISORDER (ADHD), PREDOMINANTLY INATTENTIVE TYPE: ICD-10-CM

## 2024-08-01 DIAGNOSIS — F33.1 MODERATE RECURRENT MAJOR DEPRESSION (HCC): ICD-10-CM

## 2024-08-01 DIAGNOSIS — F41.0 PANIC ATTACKS: ICD-10-CM

## 2024-08-01 PROCEDURE — 90837 PSYTX W PT 60 MINUTES: CPT | Performed by: SOCIAL WORKER
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
## 2024-08-02 NOTE — BH TREATMENT PLAN
"Outpatient Behavioral Health Psychotherapy Treatment Plan    Amrik Basurto  1990     Date of Initial Psychotherapy Assessment:    Date of Current Treatment Plan: 08/01/24  Treatment Plan Target Date: 2/1/25  Treatment Plan Expiration Date: 2/1/25    Diagnosis:   1. Generalized anxiety disorder        2. Moderate recurrent major depression (HCC)        3. Attention deficit hyperactivity disorder (ADHD), predominantly inattentive type        4. Panic attacks                Area(s) of Need: I do not get what I expect from others.  I have not been showing up for myself.     Long Term Goal 1 (in the client's own words): I want to figure out my new life.    Stage of Change: Action    Target Date for completion: 2/1/25     Anticipated therapeutic modalities: Supportive Psychotherapy     People identified to complete this goal: Celsa, family, friends, coworkers      Objective 1: (identify the means of measuring success in meeting the objective): I will return to the gym and to reading books  with the intention of starting another \"75 hard.\"       Objective 2: (identify the means of measuring success in meeting the objective): I will  seek feedback and accountability, particularly within the context of therapy.          I am currently under the care of a [facility] psychiatric provider: yes    My [facility] psychiatric provider is: Dr. Washburn    I am currently taking psychiatric medications: Yes, as prescribed    I feel that I will be ready for discharge from mental health care when I reach the following (measurable goal/objective): When I come to an understanding that I am in absolute control of my mind, body and soul.     I have created my Crisis Plan and have been offered a copy of this plan    Behavioral Health Treatment Plan [facility]: Diagnosis and Treatment Plan explained to Amrik Ordazalejandrajanette Rowley Sb acknowledges an understanding of their diagnosis. Amrik Sb agrees to this treatment plan.    I have been " offered a copy of this Treatment Plan. yes      Amrik Basurot, 1990, actively participated in the review and update of this treatment plan Amrik Sb  provided verbal consent on 8/2/2024 at 11 AM. The treatment plan was transcribed into the Electronic Health Record at a later time.

## 2024-08-22 ENCOUNTER — SOCIAL WORK (OUTPATIENT)
Dept: BEHAVIORAL/MENTAL HEALTH CLINIC | Facility: CLINIC | Age: 34
End: 2024-08-22
Payer: COMMERCIAL

## 2024-08-22 DIAGNOSIS — F33.1 MODERATE RECURRENT MAJOR DEPRESSION (HCC): ICD-10-CM

## 2024-08-22 DIAGNOSIS — F41.0 PANIC ATTACKS: ICD-10-CM

## 2024-08-22 DIAGNOSIS — F41.1 GENERALIZED ANXIETY DISORDER: Primary | ICD-10-CM

## 2024-08-22 DIAGNOSIS — F90.0 ATTENTION DEFICIT HYPERACTIVITY DISORDER (ADHD), PREDOMINANTLY INATTENTIVE TYPE: ICD-10-CM

## 2024-08-22 PROCEDURE — 90837 PSYTX W PT 60 MINUTES: CPT | Performed by: SOCIAL WORKER

## 2024-08-22 NOTE — PSYCH
"         Behavioral Health Psychotherapy Progress Note    Psychotherapy Provided: Individual Psychotherapy     Encounter Diagnoses   Name Primary?   • Generalized anxiety disorder Yes   • Moderate recurrent major depression (HCC)    • Attention deficit hyperactivity disorder (ADHD), predominantly inattentive type    • Panic attacks        Goals addressed in session: Goal 1     DATA: Amrik spoke today about his feelings re: having met a female peer who he is extremely attracted to and stated that she \"reciprocates\" my feelings.  Amrik also shared an update on being considered for several new positions at work.  During this session, this clinician used the following therapeutic modalities: Cognitive Behavioral Therapy, Dialectical Behavior Therapy, Motivational Interviewing and Supportive Psychotherapy    Substance Abuse was not addressed during this session. If the client is diagnosed with a co-occurring substance use disorder, please indicate any changes in the frequency or amount of use: . Stage of change for addressing substance use diagnoses: No substance use/Not applicable    ASSESSMENT:  Amrik Basurto presents with a Euthymic/ normal mood. Amrik benefits considerably from therapy sessions as he reports feeling \"heard.\"  his affect is Normal range and intensity, which is congruent, with his mood and the content of the session. The client has not made progress on their goals.     Amrik Basurto presents with a minimal risk of suicide, minimal risk of self-harm, and minimal risk of harm to others.    For any risk assessment that surpasses a \"low\" rating, a safety plan must be developed.    A safety plan was indicated: no  If yes, describe in detail     PLAN: Between sessions, Amrik Basurto will continue to address his relationship issues while managing his anxiety . At the next session, the therapist will use Supportive Psychotherapy to address the above.    Behavioral Health Treatment Plan and Discharge " Planning: Amrik Sb is aware of and agrees to continue to work on their treatment plan. They have identified and are working toward their discharge goals. yes      Visit start and stop times:    8/22/24    Start Time: 1200  Stop Time: 1300  Total Visit Time: 60 minutes

## 2024-09-12 ENCOUNTER — SOCIAL WORK (OUTPATIENT)
Dept: BEHAVIORAL/MENTAL HEALTH CLINIC | Facility: CLINIC | Age: 34
End: 2024-09-12
Payer: COMMERCIAL

## 2024-09-12 DIAGNOSIS — F90.0 ATTENTION DEFICIT HYPERACTIVITY DISORDER (ADHD), PREDOMINANTLY INATTENTIVE TYPE: ICD-10-CM

## 2024-09-12 DIAGNOSIS — F41.1 GENERALIZED ANXIETY DISORDER: Primary | ICD-10-CM

## 2024-09-12 DIAGNOSIS — F33.1 MODERATE RECURRENT MAJOR DEPRESSION (HCC): ICD-10-CM

## 2024-09-12 PROCEDURE — 90837 PSYTX W PT 60 MINUTES: CPT | Performed by: SOCIAL WORKER

## 2024-09-12 NOTE — PSYCH
"         Behavioral Health Psychotherapy Progress Note    Psychotherapy Provided: Individual Psychotherapy     No diagnosis found.      Goals addressed in session: Goal 1     DATA: Amrik spoke today about his feelings re: his connection to a female peer as he shared ways that they are aligned.  Amrik also reported how excited he is to be interviewing for a new position at work.    During this session, this clinician used the following therapeutic modalities: Cognitive Behavioral Therapy, Dialectical Behavior Therapy, Motivational Interviewing and Supportive Psychotherapy    Substance Abuse was not addressed during this session. If the client is diagnosed with a co-occurring substance use disorder, please indicate any changes in the frequency or amount of use: . Stage of change for addressing substance use diagnoses: No substance use/Not applicable    ASSESSMENT:  Amrik Basurto presents with a Euthymic/ normal mood. Amrik benefits considerably from therapy sessions as he reports feeling \"heard.\"  his affect is Normal range and intensity, which is congruent, with his mood and the content of the session. The client has not made progress on their goals.     Amrik Basurto presents with a minimal risk of suicide, minimal risk of self-harm, and minimal risk of harm to others.    For any risk assessment that surpasses a \"low\" rating, a safety plan must be developed.    A safety plan was indicated: no  If yes, describe in detail     PLAN: Between sessions, Amrik Basurto will continue to address his relationship issues while managing his anxiety . At the next session, the therapist will use Supportive Psychotherapy to address the above.    Behavioral Health Treatment Plan and Discharge Planning: Amrik Basurto is aware of and agrees to continue to work on their treatment plan. They have identified and are working toward their discharge goals. yes      Visit start and stop times:    9/12/24       "

## 2024-09-20 ENCOUNTER — TELEPHONE (OUTPATIENT)
Dept: PSYCHIATRY | Facility: CLINIC | Age: 34
End: 2024-09-20

## 2024-09-20 DIAGNOSIS — F41.1 GENERALIZED ANXIETY DISORDER: ICD-10-CM

## 2024-09-20 NOTE — TELEPHONE ENCOUNTER
Writer contacted patient and informed him medication has been sent to pharmacy and is ready for . Patient was appreciative for the help and assistance.

## 2024-09-20 NOTE — TELEPHONE ENCOUNTER
Writer contacted patient after receiving message that patient was unable to assisted when he called into the office as he was in need of a refill due to having run out yesterday.   Writer sent a high priority refill request as patient is going away for the weekend and requests return call when it is ready for .

## 2024-09-20 NOTE — TELEPHONE ENCOUNTER
Medication Refill Request     Name of Medication Prozac  Dose/Frequency 20 mg 1 tablet daily  Quantity 90  Verified pharmacy   [x]  Verified ordering Provider   [x]  Does patient have enough for the next 3 days? Yes [] No [x]  Does patient have a follow-up appointment scheduled? Yes [x] No []   If so when is appointment: 10/14      Patient stated he is completely out of medication and is going away for the weekend.

## 2024-09-20 NOTE — TELEPHONE ENCOUNTER
Reason for call:   [x] Refill   [] Prior Auth  [] Other:     Office:   [] PCP/Provider -   [x] Specialty/Provider -     Medication: fluoxetine (PROzac)    Dose/Frequency: 20 mg     Quantity: 90 capsules     [facility]     Does the patient have enough for 3 days?   [] Yes   [x] No - Send as HP to POD

## 2024-10-14 ENCOUNTER — TELEPHONE (OUTPATIENT)
Dept: PSYCHIATRY | Facility: CLINIC | Age: 34
End: 2024-10-14

## 2024-10-14 NOTE — TELEPHONE ENCOUNTER
Called pt and informed that today's appt is canceled due to privder is out.     Was asked to call office back to reschedule.  Please schedule when pt calls back.    Thank you.

## 2024-10-24 ENCOUNTER — SOCIAL WORK (OUTPATIENT)
Dept: BEHAVIORAL/MENTAL HEALTH CLINIC | Facility: CLINIC | Age: 34
End: 2024-10-24
Payer: COMMERCIAL

## 2024-10-24 DIAGNOSIS — F41.1 GENERALIZED ANXIETY DISORDER: Primary | ICD-10-CM

## 2024-10-24 DIAGNOSIS — F33.1 MODERATE RECURRENT MAJOR DEPRESSION (HCC): ICD-10-CM

## 2024-10-24 DIAGNOSIS — F90.0 ATTENTION DEFICIT HYPERACTIVITY DISORDER (ADHD), PREDOMINANTLY INATTENTIVE TYPE: ICD-10-CM

## 2024-10-24 PROCEDURE — 90834 PSYTX W PT 45 MINUTES: CPT | Performed by: SOCIAL WORKER

## 2024-10-31 NOTE — PSYCH
"         Behavioral Health Psychotherapy Progress Note    Psychotherapy Provided: Individual Psychotherapy     No diagnosis found.      Goals addressed in session: Goal 1     DATA: Amrik spoke today about his feelings re: his connection to a female peer as he shared ways that they are aligned.  Amrik also reported how excited he is to be interviewing for a new position at work.    During this session, this clinician used the following therapeutic modalities: Cognitive Behavioral Therapy, Dialectical Behavior Therapy, Motivational Interviewing and Supportive Psychotherapy    Substance Abuse was not addressed during this session. If the client is diagnosed with a co-occurring substance use disorder, please indicate any changes in the frequency or amount of use: . Stage of change for addressing substance use diagnoses: No substance use/Not applicable    ASSESSMENT:  Amrik Basurto presents with a Euthymic/ normal mood. Amrik benefits considerably from therapy sessions as he reports feeling \"heard.\"  his affect is Normal range and intensity, which is congruent, with his mood and the content of the session. The client has not made progress on their goals.     Amrik Basurto presents with a minimal risk of suicide, minimal risk of self-harm, and minimal risk of harm to others.    For any risk assessment that surpasses a \"low\" rating, a safety plan must be developed.    A safety plan was indicated: no  If yes, describe in detail     PLAN: Between sessions, Amrik Basurto will continue to address his relationship issues while managing his anxiety . At the next session, the therapist will use Supportive Psychotherapy to address the above.    Behavioral Health Treatment Plan and Discharge Planning: Amrik Basurto is aware of and agrees to continue to work on their treatment plan. They have identified and are working toward their discharge goals. yes      Visit start and stop times:        10/31/24     "

## 2024-11-14 ENCOUNTER — SOCIAL WORK (OUTPATIENT)
Dept: BEHAVIORAL/MENTAL HEALTH CLINIC | Facility: CLINIC | Age: 34
End: 2024-11-14
Payer: COMMERCIAL

## 2024-11-14 DIAGNOSIS — F33.1 MODERATE RECURRENT MAJOR DEPRESSION (HCC): ICD-10-CM

## 2024-11-14 DIAGNOSIS — F90.0 ATTENTION DEFICIT HYPERACTIVITY DISORDER (ADHD), PREDOMINANTLY INATTENTIVE TYPE: ICD-10-CM

## 2024-11-14 DIAGNOSIS — F41.1 GENERALIZED ANXIETY DISORDER: Primary | ICD-10-CM

## 2024-11-14 PROCEDURE — 90837 PSYTX W PT 60 MINUTES: CPT | Performed by: SOCIAL WORKER

## 2024-11-17 NOTE — PSYCH
"         Behavioral Health Psychotherapy Progress Note    Psychotherapy Provided: Individual Psychotherapy     No diagnosis found.      Goals addressed in session: Goal 1     DATA: Amrik spoke further today about his feelings re: his relationship with a female peer as he shared details re: their ability to work through conflicts while planning toward a future together.  Amrik also reported how excited he is to be interviewing for a new position at work.    During this session, this clinician used the following therapeutic modalities: Cognitive Behavioral Therapy, Dialectical Behavior Therapy, Motivational Interviewing and Supportive Psychotherapy    Substance Abuse was not addressed during this session. If the client is diagnosed with a co-occurring substance use disorder, please indicate any changes in the frequency or amount of use: . Stage of change for addressing substance use diagnoses: No substance use/Not applicable    ASSESSMENT:  Amrik Basurto presents with a Euthymic/ normal mood. Amrik benefits considerably from therapy sessions as he reports feeling \"heard.\"  his affect is Normal range and intensity, which is congruent, with his mood and the content of the session. The client has not made progress on their goals.     Amrik Basurto presents with a minimal risk of suicide, minimal risk of self-harm, and minimal risk of harm to others.    For any risk assessment that surpasses a \"low\" rating, a safety plan must be developed.    A safety plan was indicated: no  If yes, describe in detail     PLAN: Between sessions, Amrik Basurto will continue to address his relationship issues while managing his anxiety . At the next session, the therapist will use Supportive Psychotherapy to address the above.    Behavioral Health Treatment Plan and Discharge Planning: Amrik Basurto is aware of and agrees to continue to work on their treatment plan. They have identified and are working toward their discharge goals. " yes      Visit start and stop times:        11/14/24

## 2024-11-21 DIAGNOSIS — F51.04 PSYCHOPHYSIOLOGICAL INSOMNIA: ICD-10-CM

## 2024-11-21 DIAGNOSIS — F41.1 GENERALIZED ANXIETY DISORDER: Primary | ICD-10-CM

## 2024-11-21 RX ORDER — ALPRAZOLAM 0.5 MG
0.5 TABLET ORAL
Qty: 30 TABLET | Refills: 0 | Status: SHIPPED | OUTPATIENT
Start: 2024-11-21

## 2024-11-21 NOTE — TELEPHONE ENCOUNTER
Reason for call:   [x] Refill   [] Prior Auth  [] Other:     Office:   [] PCP/Provider -   [x] Specialty/Provider - Psych    Medication:   Alprazolam 0.5mg- take 1 tablet by mouth daily at bedtime as needed      Pharmacy: [facility] [place]    Does the patient have enough for 3 days?   [] Yes   [x] No - Send as HP to POD

## 2024-12-03 ENCOUNTER — SOCIAL WORK (OUTPATIENT)
Dept: BEHAVIORAL/MENTAL HEALTH CLINIC | Facility: CLINIC | Age: 34
End: 2024-12-03
Payer: COMMERCIAL

## 2024-12-03 DIAGNOSIS — F33.1 MODERATE RECURRENT MAJOR DEPRESSION (HCC): ICD-10-CM

## 2024-12-03 DIAGNOSIS — F90.0 ATTENTION DEFICIT HYPERACTIVITY DISORDER (ADHD), PREDOMINANTLY INATTENTIVE TYPE: ICD-10-CM

## 2024-12-03 DIAGNOSIS — F41.1 GENERALIZED ANXIETY DISORDER: Primary | ICD-10-CM

## 2024-12-03 DIAGNOSIS — F41.0 PANIC ATTACKS: ICD-10-CM

## 2024-12-03 PROCEDURE — 90837 PSYTX W PT 60 MINUTES: CPT | Performed by: SOCIAL WORKER

## 2024-12-03 NOTE — PSYCH
"         Behavioral Health Psychotherapy Progress Note    Psychotherapy Provided: Individual Psychotherapy     No diagnosis found.      Goals addressed in session: Goal 1     DATA: Amrik spoke today about his feelings re: his female peer's decision to end their relationship upon their return  home from a week long trip to [facility] with her family despite all of her previously- made comments about their future together.   He expressed his anger towards her for this combined with sadness that she misled him into believing that she was \"all in.\"  During this session, this clinician used the following therapeutic modalities: Cognitive Behavioral Therapy, Dialectical Behavior Therapy, Motivational Interviewing and Supportive Psychotherapy    Substance Abuse was not addressed during this session. If the client is diagnosed with a co-occurring substance use disorder, please indicate any changes in the frequency or amount of use: . Stage of change for addressing substance use diagnoses: No substance use/Not applicable    ASSESSMENT:  Amrik Basurto presents with a Dysthymic mood. Amrik was extremely grateful that this worker made time to see him on an emergency basis.  He was able to develop a plan for self care  to focus on over the next several months. his affect is Normal range and intensity, which is congruent, with his mood and the content of the session. The client has not made progress on their goals.     Amrik Basurto presents with a minimal risk of suicide, minimal risk of self-harm, and minimal risk of harm to others.    For any risk assessment that surpasses a \"low\" rating, a safety plan must be developed.    A safety plan was indicated: no  If yes, describe in detail     PLAN: Between sessions, Amrik Basurto will continue to address his relationship issues while managing his anxiety . At the next session, the therapist will use Supportive Psychotherapy to address the above.    Behavioral Health Treatment Plan and " Discharge Planning: Amrik Sb is aware of and agrees to continue to work on their treatment plan. They have identified and are working toward their discharge goals. yes      Visit start and stop times:        12/03/24

## 2024-12-17 ENCOUNTER — SOCIAL WORK (OUTPATIENT)
Dept: BEHAVIORAL/MENTAL HEALTH CLINIC | Facility: CLINIC | Age: 34
End: 2024-12-17
Payer: COMMERCIAL

## 2024-12-17 DIAGNOSIS — F41.1 GENERALIZED ANXIETY DISORDER: Primary | ICD-10-CM

## 2024-12-17 DIAGNOSIS — F33.1 MODERATE RECURRENT MAJOR DEPRESSION (HCC): ICD-10-CM

## 2024-12-17 DIAGNOSIS — F90.0 ATTENTION DEFICIT HYPERACTIVITY DISORDER (ADHD), PREDOMINANTLY INATTENTIVE TYPE: ICD-10-CM

## 2024-12-17 DIAGNOSIS — F41.0 PANIC ATTACKS: ICD-10-CM

## 2024-12-17 PROCEDURE — 90834 PSYTX W PT 45 MINUTES: CPT | Performed by: SOCIAL WORKER

## 2024-12-17 NOTE — PSYCH
"    Behavioral Health Psychotherapy Progress Note    Psychotherapy Provided: Individual Psychotherapy     Encounter Diagnoses   Name Primary?   • Generalized anxiety disorder Yes   • Attention deficit hyperactivity disorder (ADHD), predominantly inattentive type    • Panic attacks    • Moderate recurrent major depression (HCC)          Goals addressed in session: Goal 1     DATA: Amrik spoke today about his feelings re: his  decision following his last session to engage in another round of \"75 hard\" in order to get \"back on track\" both mentally and physically.  He reported that he remains concerned about his twin nephews and has been engaging in \"parental\" dialogue with them when his sister is struggling with them.   During this session, this clinician used the following therapeutic modalities: Cognitive Behavioral Therapy, Dialectical Behavior Therapy, Motivational Interviewing and Supportive Psychotherapy    Substance Abuse was not addressed during this session. If the client is diagnosed with a co-occurring substance use disorder, please indicate any changes in the frequency or amount of use: . Stage of change for addressing substance use diagnoses: No substance use/Not applicable    ASSESSMENT:  Amrik Basurto presents with a Euthymic/ normal mood. Amrik appears committed to using the next several months for intense focus on self care.  He requested that his sessions remain consistent during this time period.  his affect is Normal range and intensity, which is congruent, with his mood and the content of the session. The client has not made progress on their goals.     Amrik Basurto presents with a minimal risk of suicide, minimal risk of self-harm, and minimal risk of harm to others.    For any risk assessment that surpasses a \"low\" rating, a safety plan must be developed.    A safety plan was indicated: no  If yes, describe in detail     PLAN: Between sessions, Amrik Basurto will consider ways taht he may " have contributed to his relationship issues while managing his anxiety . At the next session, the therapist will use Supportive Psychotherapy to address the above.    Behavioral Health Treatment Plan and Discharge Planning: Amrik Basurto is aware of and agrees to continue to work on their treatment plan. They have identified and are working toward their discharge goals. yes      Visit start and stop times:        12/17/24  Start Time: 1100  Stop Time: 1150  Total Visit Time: 50 minutes

## 2025-01-02 ENCOUNTER — TELEMEDICINE (OUTPATIENT)
Dept: BEHAVIORAL/MENTAL HEALTH CLINIC | Facility: CLINIC | Age: 35
End: 2025-01-02
Payer: COMMERCIAL

## 2025-01-02 DIAGNOSIS — F41.0 PANIC ATTACKS: ICD-10-CM

## 2025-01-02 DIAGNOSIS — F90.0 ATTENTION DEFICIT HYPERACTIVITY DISORDER (ADHD), PREDOMINANTLY INATTENTIVE TYPE: ICD-10-CM

## 2025-01-02 DIAGNOSIS — F33.1 MODERATE RECURRENT MAJOR DEPRESSION (HCC): ICD-10-CM

## 2025-01-02 DIAGNOSIS — F41.1 GENERALIZED ANXIETY DISORDER: Primary | ICD-10-CM

## 2025-01-02 PROCEDURE — 90834 PSYTX W PT 45 MINUTES: CPT | Performed by: SOCIAL WORKER
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
## 2025-01-14 ENCOUNTER — TELEPHONE (OUTPATIENT)
Dept: PSYCHIATRY | Facility: CLINIC | Age: 35
End: 2025-01-14

## 2025-01-14 NOTE — TELEPHONE ENCOUNTER
Called Amrik and left a VM:  message received and looking to clarify what refill he needs; office number given to call and follow prompts for medication refill.

## 2025-01-14 NOTE — TELEPHONE ENCOUNTER
Message received as a staff message:     ----- Message -----   From: Celsa Read   Sent: 1/13/2025   4:49 PM EST   To: Psychiatry Pod Clinical   Subject: pt needs to cancel dr marylin simms, needs to *     Pt is also in need of medication refill prior to departing to go out of [facility] on 1/15/25.

## 2025-01-15 NOTE — PSYCH
Amrik Sb did not attend today's (01/16/25) appointment and did not inform clerical staff of his potential absence on days prior to the evaluation. I was present on the virtual platform ([employer]) until 9:45 AM (15 minutes past scheduled start time) without success as Amrik did not log-on or connect. Treatment team will attempt outreach and reschedule the patient accordingly. If the patient cannot be contacted directly, a HIPPA compliant voicemail will be left.     Treatment Plan not completed within required time limits due to: Amrik Basurto no show appointment on 01/16/25

## 2025-01-16 ENCOUNTER — TELEMEDICINE (OUTPATIENT)
Dept: PSYCHIATRY | Facility: CLINIC | Age: 35
End: 2025-01-16

## 2025-01-16 DIAGNOSIS — Z91.199 NO-SHOW FOR APPOINTMENT: Primary | ICD-10-CM

## 2025-01-16 PROCEDURE — NOSHOW: Performed by: STUDENT IN AN ORGANIZED HEALTH CARE EDUCATION/TRAINING PROGRAM

## 2025-01-28 ENCOUNTER — TELEMEDICINE (OUTPATIENT)
Dept: BEHAVIORAL/MENTAL HEALTH CLINIC | Facility: CLINIC | Age: 35
End: 2025-01-28
Payer: COMMERCIAL

## 2025-01-28 DIAGNOSIS — F41.0 PANIC ATTACKS: ICD-10-CM

## 2025-01-28 DIAGNOSIS — F90.0 ATTENTION DEFICIT HYPERACTIVITY DISORDER (ADHD), PREDOMINANTLY INATTENTIVE TYPE: ICD-10-CM

## 2025-01-28 DIAGNOSIS — F33.1 MODERATE RECURRENT MAJOR DEPRESSION (HCC): ICD-10-CM

## 2025-01-28 DIAGNOSIS — F41.1 GENERALIZED ANXIETY DISORDER: Primary | ICD-10-CM

## 2025-01-28 PROCEDURE — 90837 PSYTX W PT 60 MINUTES: CPT | Performed by: SOCIAL WORKER
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
## 2025-01-29 ENCOUNTER — OFFICE VISIT (OUTPATIENT)
Dept: URGENT CARE | Age: 35
End: 2025-01-29
Payer: COMMERCIAL

## 2025-01-29 VITALS
WEIGHT: 202 LBS | DIASTOLIC BLOOD PRESSURE: 85 MMHG | TEMPERATURE: 98.2 F | BODY MASS INDEX: 27.36 KG/M2 | OXYGEN SATURATION: 97 % | RESPIRATION RATE: 16 BRPM | HEIGHT: 72 IN | HEART RATE: 58 BPM | SYSTOLIC BLOOD PRESSURE: 132 MMHG

## 2025-01-29 DIAGNOSIS — K31.89 SPASM OF GI TRACT: Primary | ICD-10-CM

## 2025-01-29 DIAGNOSIS — K29.70 GASTRITIS, PRESENCE OF BLEEDING UNSPECIFIED, UNSPECIFIED CHRONICITY, UNSPECIFIED GASTRITIS TYPE: ICD-10-CM

## 2025-01-29 PROCEDURE — 99214 OFFICE O/P EST MOD 30 MIN: CPT | Performed by: PHYSICIAN ASSISTANT

## 2025-01-29 RX ORDER — MAGNESIUM HYDROXIDE/ALUMINUM HYDROXICE/SIMETHICONE 120; 1200; 1200 MG/30ML; MG/30ML; MG/30ML
30 SUSPENSION ORAL ONCE
Status: COMPLETED | OUTPATIENT
Start: 2025-01-29 | End: 2025-01-29

## 2025-01-29 RX ORDER — DICYCLOMINE HYDROCHLORIDE 10 MG/1
10 CAPSULE ORAL
Qty: 40 CAPSULE | Refills: 0 | Status: SHIPPED | OUTPATIENT
Start: 2025-01-29

## 2025-01-29 RX ADMIN — MAGNESIUM HYDROXIDE/ALUMINUM HYDROXICE/SIMETHICONE 30 ML: 120; 1200; 1200 SUSPENSION ORAL at 20:52
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
## 2025-02-20 ENCOUNTER — SOCIAL WORK (OUTPATIENT)
Dept: BEHAVIORAL/MENTAL HEALTH CLINIC | Facility: CLINIC | Age: 35
End: 2025-02-20
Payer: COMMERCIAL

## 2025-02-20 DIAGNOSIS — F41.1 GENERALIZED ANXIETY DISORDER: Primary | ICD-10-CM

## 2025-02-20 DIAGNOSIS — F41.0 PANIC ATTACKS: ICD-10-CM

## 2025-02-20 DIAGNOSIS — F90.0 ATTENTION DEFICIT HYPERACTIVITY DISORDER (ADHD), PREDOMINANTLY INATTENTIVE TYPE: ICD-10-CM

## 2025-02-20 DIAGNOSIS — F33.1 MODERATE RECURRENT MAJOR DEPRESSION (HCC): ICD-10-CM

## 2025-02-20 PROCEDURE — 90837 PSYTX W PT 60 MINUTES: CPT | Performed by: SOCIAL WORKER

## 2025-02-20 NOTE — BH TREATMENT PLAN
Outpatient Behavioral Health Psychotherapy Treatment Plan    Amrik Sb  1990     Date of Initial Psychotherapy Assessment:    Date of Current Treatment Plan: 02/20/25  Treatment Plan Target Date: 08/20/25  Treatment Plan Expiration Date: 8/20/25    Diagnosis:   1. Generalized anxiety disorder        2. Attention deficit hyperactivity disorder (ADHD), predominantly inattentive type        3. Panic attacks        4. Moderate recurrent major depression (HCC)                Area(s) of Need: I have not been consistent with self growth in the past after a period of time passes.       Long Term Goal 1 (in the client's own words): I want to maintain the healthy habits that I have been practicing consistently while living intentionally and mindfully.     Stage of Change: Action    Target Date for completion: 8/20/25     Anticipated therapeutic modalities: Supportive Psychotherapy     People identified to complete this goal: Celsa, family, friends, coworkers      Objective 1: (identify the means of measuring success in meeting the objective): I will continue to engage in physically and mentally stimulating activity outside of the [facility] hard program.      Objective 2: (identify the means of measuring success in meeting the objective): I will  practice gratitude while providing support and encouragement to others.        I am currently under the care of a [facility] psychiatric provider: yes    My [facility] psychiatric provider is: Dr. Washburn    I am currently taking psychiatric medications: Yes, as prescribed    I feel that I will be ready for discharge from mental health care when I reach the following (measurable goal/objective): When I come to an understanding that I am in absolute control of my mind, body and soul.     I have created my Crisis Plan and have been offered a copy of this plan    Behavioral Health Treatment Plan [facility]: Diagnosis and Treatment Plan explained to Amrik Basurto  acknowledges an understanding of their diagnosis. Amrik Sb agrees to this treatment plan.    I have been offered a copy of this Treatment Plan. yes      Amrik Basurto, 1990, actively participated in the review and update of this treatment plan Amrik Sb  provided verbal consent on 2/20/2025 at 11 AM. The treatment plan was transcribed into the Electronic Health Record at a later time.

## 2025-02-20 NOTE — PSYCH
"    Behavioral Health Psychotherapy Progress Note    Psychotherapy Provided: Individual Psychotherapy     Encounter Diagnoses   Name Primary?   • Generalized anxiety disorder Yes   • Attention deficit hyperactivity disorder (ADHD), predominantly inattentive type    • Panic attacks    • Moderate recurrent major depression (HCC)                Goals addressed in session: Goal 1     DATA: Amrik spoke today about his feelings re: the 5 books he has read while dong this round of [employer] and the considerable impact that they have had on him.  He shared insights today from each of these book and changes that he has implemented since reading them.  During this session, this clinician used the following therapeutic modalities: Cognitive Behavioral Therapy, Dialectical Behavior Therapy, Motivational Interviewing and Supportive Psychotherapy    Substance Abuse was not addressed during this session. If the client is diagnosed with a co-occurring substance use disorder, please indicate any changes in the frequency or amount of use: . Stage of change for addressing substance use diagnoses: No substance use/Not applicable    ASSESSMENT:  Amrik Basurto presents with a Euthymic/ normal mood. Amrik appears committed to continuing this path of self growth even after completing 75 hard.    Treatment Plan Tracking    # 1Treatment Plan not completed within required time limits due to:  having not been seen during the past 30 days.  .         his affect is Normal range and intensity, which is congruent, with his mood and the content of the session. The client has not made progress on their goals.     Amrik Basurto presents with a minimal risk of suicide, minimal risk of self-harm, and minimal risk of harm to others.    For any risk assessment that surpasses a \"low\" rating, a safety plan must be developed.    A safety plan was indicated: no  If yes, describe in detail     PLAN: Between sessions, Amrik Basurto will apply these methods " to his daily life in order to continue his growth. . At the next session, the therapist will use Supportive Psychotherapy to address the above.    Behavioral Health Treatment Plan and Discharge Planning: Amrik Basurto is aware of and agrees to continue to work on their treatment plan. They have identified and are working toward their discharge goals. yes      Visit start and stop times:        02/20/25  Start Time: 1100  Stop Time: 1155  Total Visit Time: 55 minutes

## 2025-04-03 ENCOUNTER — SOCIAL WORK (OUTPATIENT)
Dept: BEHAVIORAL/MENTAL HEALTH CLINIC | Facility: CLINIC | Age: 35
End: 2025-04-03
Payer: COMMERCIAL

## 2025-04-03 DIAGNOSIS — F41.0 PANIC ATTACKS: ICD-10-CM

## 2025-04-03 DIAGNOSIS — F33.1 MODERATE RECURRENT MAJOR DEPRESSION (HCC): ICD-10-CM

## 2025-04-03 DIAGNOSIS — F90.0 ATTENTION DEFICIT HYPERACTIVITY DISORDER (ADHD), PREDOMINANTLY INATTENTIVE TYPE: ICD-10-CM

## 2025-04-03 DIAGNOSIS — F41.1 GENERALIZED ANXIETY DISORDER: Primary | ICD-10-CM

## 2025-04-03 PROCEDURE — 90834 PSYTX W PT 45 MINUTES: CPT | Performed by: SOCIAL WORKER

## 2025-04-03 NOTE — PSYCH
"    Behavioral Health Psychotherapy Progress Note    Psychotherapy Provided: Individual Psychotherapy     Encounter Diagnoses   Name Primary?   • Generalized anxiety disorder Yes   • Attention deficit hyperactivity disorder (ADHD), predominantly inattentive type    • Panic attacks    • Moderate recurrent major depression (HCC)                Goals addressed in session: Goal 1     DATA: Amrik spoke today about his feelings re: the decrease in anxiety but increase in overwhelm and attentional struggles since finishing [employer].  He shared details re: his new position combined with still being held to his previous position while under the current hiring freeze.  Amrik also expressed his concern for his parents failing health and his sister's struggles to care for her autistic children. During this session, this clinician used the following therapeutic modalities: Cognitive Behavioral Therapy, Dialectical Behavior Therapy, Motivational Interviewing and Supportive Psychotherapy    Substance Abuse was not addressed during this session. If the client is diagnosed with a co-occurring substance use disorder, please indicate any changes in the frequency or amount of use: . Stage of change for addressing substance use diagnoses: No substance use/Not applicable    ASSESSMENT:  Amrik Basurto presents with a Euthymic/ normal mood. Amrik appears to be struggling with the uncertainty of his current connection with the individual that he has been dating combined with his reluctance to \"go back to dating\".    his affect is Normal range and intensity, which is congruent, with his mood and the content of the session. The client has not made progress on their goals.     Amrik Basurto presents with a minimal risk of suicide, minimal risk of self-harm, and minimal risk of harm to others.    For any risk assessment that surpasses a \"low\" rating, a safety plan must be developed.    A safety plan was indicated: no  If yes, describe in " detail     PLAN: Between sessions, Amrik Basurto will research ways that mindfulness techniques can be used to decrease attentional struggles.  At the next session, the therapist will use Supportive Psychotherapy to address the above.    Behavioral Health Treatment Plan and Discharge Planning: Amrik Basurto is aware of and agrees to continue to work on their treatment plan. They have identified and are working toward their discharge goals. yes      Visit start and stop times:        04/03/25  Start Time: 1100  Stop Time: 1150  Total Visit Time: 50 minutes

## 2025-05-01 ENCOUNTER — TELEPHONE (OUTPATIENT)
Dept: PSYCHIATRY | Facility: CLINIC | Age: 35
End: 2025-05-01

## 2025-05-01 NOTE — TELEPHONE ENCOUNTER
Called and left message for patient to return a call to [phone] and schedule soonest  follow up with provider (Dr Washburn). Please schedule upon return call. Thank you.

## 2025-06-17 ENCOUNTER — SOCIAL WORK (OUTPATIENT)
Dept: BEHAVIORAL/MENTAL HEALTH CLINIC | Facility: CLINIC | Age: 35
End: 2025-06-17
Payer: COMMERCIAL

## 2025-06-17 DIAGNOSIS — F33.1 MODERATE RECURRENT MAJOR DEPRESSION (HCC): ICD-10-CM

## 2025-06-17 DIAGNOSIS — F41.0 PANIC ATTACKS: ICD-10-CM

## 2025-06-17 DIAGNOSIS — F90.0 ATTENTION DEFICIT HYPERACTIVITY DISORDER (ADHD), PREDOMINANTLY INATTENTIVE TYPE: ICD-10-CM

## 2025-06-17 DIAGNOSIS — F41.1 GENERALIZED ANXIETY DISORDER: Primary | ICD-10-CM

## 2025-06-17 PROCEDURE — 90834 PSYTX W PT 45 MINUTES: CPT | Performed by: SOCIAL WORKER

## 2025-06-17 NOTE — BH CRISIS PLAN
Client Name: Amrik Basurto       Client YOB: 1990  : 1990    Treatment Team (include name and contact information):     Healthcare Provider  Christopher Brooks DO  No address on file  [phone]    Type of Plan   * Child plans (children 12 yo and younger) must be completed and signed by the child's legal guardian   * Plans for all individuals 13 yo and above must be signed by the client.     Plan Type: adolescent/adult (14 and over) Initial      My Personal Strengths are (in the client's own words):  I am outgoing, empathetic, emotionally intelligent, compassionate, strong willed, I can talk to anyone in any situation. I am not afraid of conflict, I do not allow others to take advantage of me.     The stressors and triggers that may put me at risk are:  other (describe) Loss of those close to me through death or distance, feeling helpless in situations where I cannot help someone or get them to understand me,worrying too much about what others think of me or are doing when I am not around.      Coping skills I can use to keep myself calm and safe:  Other (describe) Breathing, music, gym, support system    Coping skills/supports I can use to maintain abstinence from substance use:   na    The people that provide me with help and support: (Include name, contact, and how they can help)   Support person #1: Deng    * Phone number:     * How can they help me? He is my best friend, a rock, we talk about everything   Support person #2:Tita    * Phone number:     * How can they help me? She is my sister, my blood and has been close to me since we were young.  Her soft voice helps to calm me down.           In the past, the following has helped me in times of crisis:    Other: books, podcasts, walks, gym, diet, friends.       If it is an emergency and you need immediate help, call     If there is a possibility of danger to yourself or others, call the following crisis hotline resources:      Adult Crisis Numbers  Suicide Prevention Hotline - Dial 9-8-8  [facility]: [phone]  [facility]: [phone]  [facility]: [phone]  [facility]: [phone]  [facility]: [phone]  [facility]: [phone]  [facility]: [phone]  [facility]: [phone] (daytime).       [phone] (after hours, weekends, holidays)     Child/Adolescent Crisis Numbers   [facility]: [phone]   [facility]: [phone]   [place], [place]: [phone]   [facility]: [phone]    Please note: Some [facility] do not have a separate number for Child/Adolescent specific crisis. If your county is not listed under Child/Adolescent, please call the adult number for your county     National Talk to Text Line   All Ages - [phone]    In the event your feelings become unmanageable, and you cannot reach your support system, you will call [phone] immediately or go to the nearest hospital emergency room.    Updated 6/17/25

## 2025-06-17 NOTE — PSYCH
"    Behavioral Health Psychotherapy Progress Note    Psychotherapy Provided: Individual Psychotherapy     Encounter Diagnoses   Name Primary?   • Generalized anxiety disorder Yes   • Attention deficit hyperactivity disorder (ADHD), predominantly inattentive type    • Panic attacks    • Moderate recurrent major depression (HCC)                  Goals addressed in session: Goal 1     DATA: Amrik spoke today about his feelings re: his decision to stop taking his prescribed medication after failing to refill the script.  He reported that he \"feels like himself,\" and is adjusting to feeling his emotions more strongly, particularly his frustration.  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  He expressed enjoyment in his relationship while admitting how much he hates being alone when she is with her kids.   During this session, this clinician used the following therapeutic modalities: Cognitive Behavioral Therapy, Dialectical Behavior Therapy, Motivational Interviewing and Supportive Psychotherapy    Substance Abuse was not addressed during this session. If the client is diagnosed with a co-occurring substance use disorder, please indicate any changes in the frequency or amount of use: . Stage of change for addressing substance use diagnoses: No substance use/Not applicable    ASSESSMENT:  Amrik Basurto presents with a Euthymic/ normal mood. Amrik appears to be adjusting to all of the above changes in his life.  He expressed the desire to return to therapy on a consistent basis at this time while also decreasing his alcohol consumption, improving his eating and returning to the gym.   his affect is Normal range and intensity, which is congruent, with his mood and the content of the session. The client has not made progress on their goals.     Amrik Basurto presents with a minimal risk of suicide, minimal risk of self-harm, and minimal risk of harm to " "others.    For any risk assessment that surpasses a \"low\" rating, a safety plan must be developed.    A safety plan was indicated: no  If yes, describe in detail     PLAN: Between sessions, Amrik Basurto will follow through with the above daily self care routines.   At the next session, the therapist will use Supportive Psychotherapy to address the above.    Behavioral Health Treatment Plan and Discharge Planning: Amrik Basurto is aware of and agrees to continue to work on their treatment plan. They have identified and are working toward their discharge goals. yes      Visit start and stop times:        06/17/25  Start Time: 1400  Stop Time: 1450  Total Visit Time: 50 minutes  [id number][facility]  [id number]"

## 2025-07-14 ENCOUNTER — TELEMEDICINE (OUTPATIENT)
Dept: BEHAVIORAL/MENTAL HEALTH CLINIC | Facility: CLINIC | Age: 35
End: 2025-07-14
Payer: COMMERCIAL

## 2025-07-14 DIAGNOSIS — F33.1 MODERATE RECURRENT MAJOR DEPRESSION (HCC): ICD-10-CM

## 2025-07-14 DIAGNOSIS — F41.0 PANIC ATTACKS: ICD-10-CM

## 2025-07-14 DIAGNOSIS — F90.0 ATTENTION DEFICIT HYPERACTIVITY DISORDER (ADHD), PREDOMINANTLY INATTENTIVE TYPE: ICD-10-CM

## 2025-07-14 DIAGNOSIS — F41.1 GENERALIZED ANXIETY DISORDER: Primary | ICD-10-CM

## 2025-07-14 PROCEDURE — 90837 PSYTX W PT 60 MINUTES: CPT | Performed by: SOCIAL WORKER

## 2025-07-14 NOTE — PSYCH
"    Behavioral Health Psychotherapy Progress Note    Psychotherapy Provided: Individual Psychotherapy     Encounter Diagnoses   Name Primary?   • Generalized anxiety disorder Yes   • Attention deficit hyperactivity disorder (ADHD), predominantly inattentive type    • Panic attacks    • Moderate recurrent major depression (HCC)          Goals addressed in session: Goal 1     DATA: Amrik spoke today about his feelings of overwhelm and mental exhaustion with the responsibilities associated with his new position.  He shared that he has not regressed into panic despite being medication-free.  He is \"handling\" his emotions but admits to frustration that his loved ones are not.  He shared how supportive his girlfriend has been and ways that they have continued to build their communication skills.   During this session, this clinician used the following therapeutic modalities: Cognitive Behavioral Therapy, Dialectical Behavior Therapy, Motivational Interviewing and Supportive Psychotherapy    Substance Abuse was not addressed during this session. If the client is diagnosed with a co-occurring substance use disorder, please indicate any changes in the frequency or amount of use: . Stage of change for addressing substance use diagnoses: No substance use/Not applicable    ASSESSMENT:  Amrik Basurto presents with a Euthymic/ normal mood. Amrik demonstrated considerable insight into ways that he has grown while continuing to express investment in resolving his trauma history.     his affect is Normal range and intensity, which is congruent, with his mood and the content of the session. The client has not made progress on their goals.     Amrik Basurto presents with a minimal risk of suicide, minimal risk of self-harm, and minimal risk of harm to others.    For any risk assessment that surpasses a \"low\" rating, a safety plan must be developed.    A safety plan was indicated: no  If yes, describe in detail     PLAN: Between " sessions, Amrik Basurto will follow through with the consistent daily self care routines.   At the next session, the therapist will use Supportive Psychotherapy to address the above.    Behavioral Health Treatment Plan and Discharge Planning: Amrik Basurto is aware of and agrees to continue to work on their treatment plan. They have identified and are working toward their discharge goals. yes      Visit start and stop times:        07/14/25  Start Time: 1600  Stop Time: 1655  Total Visit Time: 55 minutes    Virtual Regular Visit    Verification of patient location:  Patient is located at Other in the following state in which I hold an active license [place]    The patient was identified by name and date of birth. Amrik Basurto was informed that this is a telemedicine visit and that the visit is being conducted throughthe Epic Embedded platform. He agrees to proceed..  My office door was closed. No one else was in the room.  He acknowledged consent and understanding of privacy and security of the video platform. The patient has agreed to participate and understands they can discontinue the visit at any time.     Patient is aware this is a billable service.

## 2025-07-14 NOTE — BH CRISIS PLAN
Client Name: Amrik Basurto       Client YOB: 1990  : 1990    Treatment Team (include name and contact information):     Healthcare Provider  Christopher Brooks DO  No address on file  [phone]    Type of Plan   * Child plans (children 14 yo and younger) must be completed and signed by the child's legal guardian   * Plans for all individuals 13 yo and above must be signed by the client.     Plan Type: adolescent/adult (14 and over) Initial      My Personal Strengths are (in the client's own words):  I am outgoing, empathetic, emotionally intelligent, compassionate, strong willed, I can talk to anyone in any situation. I am not afraid of conflict, I do not allow others to take advantage of me.     The stressors and triggers that may put me at risk are:  other (describe) Loss of those close to me through death or distance, feeling helpless in situations where I cannot help someone or get them to understand me,worrying too much about what others think of me or are doing when I am not around.      Coping skills I can use to keep myself calm and safe:  Other (describe) Breathing, music, gym, support system    Coping skills/supports I can use to maintain abstinence from substance use:   na    The people that provide me with help and support: (Include name, contact, and how they can help)   Support person #1: Deng    * Phone number:     * How can they help me? He is my best friend, a rock, we talk about everything   Support person #2:Tita    * Phone number:     * How can they help me? She is my sister, my blood and has been close to me since we were young.  Her soft voice helps to calm me down.           In the past, the following has helped me in times of crisis:    Other: books, podcasts, walks, gym, diet, friends.       If it is an emergency and you need immediate help, call     If there is a possibility of danger to yourself or others, call the following crisis hotline resources:      Adult Crisis Numbers  Suicide Prevention Hotline - Dial 9-8-8  [facility]: [phone]  [facility]: [phone]  [facility]: [phone]  [facility]: [phone]  [place]: [phone]  [facility]: [phone]  [facility]: [phone]  [facility]: [phone] (daytime).       [phone] (after hours, weekends, holidays)     Child/Adolescent Crisis Numbers   [facility]: [phone]   [facility]: [phone]   [place], [place]: [phone]   [facility]: [phone]    Please note: Some [facility] do not have a separate number for Child/Adolescent specific crisis. If your county is not listed under Child/Adolescent, please call the adult number for your county     National Talk to Text Line   All Ages - [phone]    In the event your feelings become unmanageable, and you cannot reach your support system, you will call [phone] immediately or go to the nearest hospital emergency room.    Updated 6/17/25

## 2025-08-14 ENCOUNTER — SOCIAL WORK (OUTPATIENT)
Dept: BEHAVIORAL/MENTAL HEALTH CLINIC | Facility: CLINIC | Age: 35
End: 2025-08-14
Payer: COMMERCIAL

## (undated) DEVICE — SPECIMEN CONTAINER STERILE PEEL PACK

## (undated) DEVICE — SYRINGE 50ML LL

## (undated) DEVICE — ANTI-FOG SOLUTION WITH FOAM PAD: Brand: DEVON

## (undated) DEVICE — STERILE NASAL PACK: Brand: CARDINAL HEALTH

## (undated) DEVICE — BLADE 1884006EM RAD40 4MM M4 ROTATE ROHS: Brand: FUSION®

## (undated) DEVICE — MAYO STAND COVER: Brand: CONVERTORS

## (undated) DEVICE — 2000CC GUARDIAN II: Brand: GUARDIAN

## (undated) DEVICE — SURGIFOAM 7 X 12 SPONGE ABS

## (undated) DEVICE — LIGHT GLOVE GREEN

## (undated) DEVICE — INSTRUMENT TRACKER 9733533XOM ENT 1PK

## (undated) DEVICE — NEURO PATTIES 1/2 X 3

## (undated) DEVICE — ARISTA AH ABSORBABLE HEMOSTATIC PARTICLES: Brand: ARISTA™ AH

## (undated) DEVICE — DRAPE FLUID WARMER (BIRD BATH)

## (undated) DEVICE — TUBING SUCTION 5MM X 12 FT

## (undated) DEVICE — TUBING 1895522 5PK STRAIGHTSHOT TO XPS: Brand: STRAIGHTSHOT®

## (undated) DEVICE — PROXIMATE SKIN STAPLERS (35 WIDE) CONTAINS 35 STAINLESS STEEL STAPLES (FIXED HEAD): Brand: PROXIMATE

## (undated) DEVICE — SYRINGE 3ML LL

## (undated) DEVICE — PATIENT TRACKER 9734887XOM NON-INVASIVE

## (undated) DEVICE — 3000CC GUARDIAN II: Brand: GUARDIAN

## (undated) DEVICE — NEEDLE SPINAL 22G X 3.5IN  QUINCKE

## (undated) DEVICE — CULTURE TUBE ANAEROBIC

## (undated) DEVICE — CULTURE TUBE AEROBIC

## (undated) DEVICE — SUCTION COAGULATOR: Brand: VALLEYLAB

## (undated) DEVICE — BLADE 1884080EM TRICUT 4MMX13CM M4 ROHS: Brand: FUSION®

## (undated) DEVICE — GLOVE SRG BIOGEL ECLIPSE 7